# Patient Record
Sex: FEMALE | Race: WHITE | NOT HISPANIC OR LATINO | Employment: OTHER | URBAN - METROPOLITAN AREA
[De-identification: names, ages, dates, MRNs, and addresses within clinical notes are randomized per-mention and may not be internally consistent; named-entity substitution may affect disease eponyms.]

---

## 2017-04-26 RX ORDER — BISOPROLOL FUMARATE AND HYDROCHLOROTHIAZIDE 5; 6.25 MG/1; MG/1
TABLET ORAL
Qty: 90 TABLET | Refills: 1 | Status: SHIPPED | OUTPATIENT
Start: 2017-04-26 | End: 2017-10-16 | Stop reason: SDUPTHER

## 2017-04-26 RX ORDER — FLUTICASONE PROPIONATE 50 MCG
SPRAY, SUSPENSION (ML) NASAL
Qty: 48 G | Refills: 1 | Status: SHIPPED | OUTPATIENT
Start: 2017-04-26 | End: 2017-10-16 | Stop reason: SDUPTHER

## 2017-05-31 ENCOUNTER — OFFICE VISIT (OUTPATIENT)
Dept: INTERNAL MEDICINE | Facility: CLINIC | Age: 67
End: 2017-05-31

## 2017-05-31 VITALS
BODY MASS INDEX: 25.44 KG/M2 | RESPIRATION RATE: 16 BRPM | WEIGHT: 149 LBS | SYSTOLIC BLOOD PRESSURE: 124 MMHG | OXYGEN SATURATION: 96 % | TEMPERATURE: 96.7 F | HEIGHT: 64 IN | DIASTOLIC BLOOD PRESSURE: 80 MMHG | HEART RATE: 51 BPM

## 2017-05-31 DIAGNOSIS — E55.9 HYPOVITAMINOSIS D: ICD-10-CM

## 2017-05-31 DIAGNOSIS — R73.01 IMPAIRED FASTING GLUCOSE: ICD-10-CM

## 2017-05-31 DIAGNOSIS — I65.23 CAROTID ATHEROSCLEROSIS, BILATERAL: ICD-10-CM

## 2017-05-31 DIAGNOSIS — I10 ESSENTIAL HYPERTENSION: Primary | ICD-10-CM

## 2017-05-31 PROBLEM — K57.30 DIVERTICULOSIS OF LARGE INTESTINE WITHOUT HEMORRHAGE: Status: ACTIVE | Noted: 2017-05-31

## 2017-05-31 LAB
25(OH)D3+25(OH)D2 SERPL-MCNC: 42.9 NG/ML (ref 30–100)
ALBUMIN SERPL-MCNC: 4.3 G/DL (ref 3.5–5.2)
ALBUMIN/GLOB SERPL: 1.5 G/DL
ALP SERPL-CCNC: 77 U/L (ref 39–117)
ALT SERPL-CCNC: 10 U/L (ref 1–33)
APPEARANCE UR: CLEAR
AST SERPL-CCNC: 22 U/L (ref 1–32)
BACTERIA #/AREA URNS HPF: NORMAL /HPF
BILIRUB SERPL-MCNC: 0.5 MG/DL (ref 0.1–1.2)
BILIRUB UR QL STRIP: NEGATIVE
BUN SERPL-MCNC: 23 MG/DL (ref 8–23)
BUN/CREAT SERPL: 22.5 (ref 7–25)
CALCIUM SERPL-MCNC: 9.8 MG/DL (ref 8.6–10.5)
CASTS URNS MICRO: NORMAL
CHLORIDE SERPL-SCNC: 99 MMOL/L (ref 98–107)
CHOLEST SERPL-MCNC: 177 MG/DL (ref 0–200)
CO2 SERPL-SCNC: 30.1 MMOL/L (ref 22–29)
COLOR UR: YELLOW
CREAT SERPL-MCNC: 1.02 MG/DL (ref 0.57–1)
EPI CELLS #/AREA URNS HPF: NORMAL /HPF
GLOBULIN SER CALC-MCNC: 2.8 GM/DL
GLUCOSE SERPL-MCNC: 65 MG/DL (ref 65–99)
GLUCOSE UR QL: NEGATIVE
HBA1C MFR BLD: 5.8 % (ref 4.8–5.6)
HDLC SERPL-MCNC: 75 MG/DL (ref 40–60)
HGB UR QL STRIP: NEGATIVE
KETONES UR QL STRIP: NEGATIVE
LDLC SERPL CALC-MCNC: 86 MG/DL (ref 0–100)
LEUKOCYTE ESTERASE UR QL STRIP: NEGATIVE
NITRITE UR QL STRIP: NEGATIVE
PH UR STRIP: 6.5 [PH] (ref 5–8)
POTASSIUM SERPL-SCNC: 4.6 MMOL/L (ref 3.5–5.2)
PROT SERPL-MCNC: 7.1 G/DL (ref 6–8.5)
PROT UR QL STRIP: NEGATIVE
RBC #/AREA URNS HPF: NORMAL /HPF
SODIUM SERPL-SCNC: 139 MMOL/L (ref 136–145)
SP GR UR: 1.01 (ref 1–1.03)
TRIGL SERPL-MCNC: 79 MG/DL (ref 0–150)
UROBILINOGEN UR STRIP-MCNC: (no result) MG/DL
VLDLC SERPL CALC-MCNC: 15.8 MG/DL (ref 5–40)
WBC #/AREA URNS HPF: NORMAL /HPF

## 2017-05-31 PROCEDURE — 99213 OFFICE O/P EST LOW 20 MIN: CPT | Performed by: INTERNAL MEDICINE

## 2017-08-09 ENCOUNTER — OFFICE VISIT (OUTPATIENT)
Dept: INTERNAL MEDICINE | Facility: CLINIC | Age: 67
End: 2017-08-09

## 2017-08-09 VITALS
SYSTOLIC BLOOD PRESSURE: 120 MMHG | DIASTOLIC BLOOD PRESSURE: 90 MMHG | RESPIRATION RATE: 16 BRPM | OXYGEN SATURATION: 98 % | BODY MASS INDEX: 25.27 KG/M2 | HEART RATE: 54 BPM | HEIGHT: 64 IN | WEIGHT: 148 LBS | TEMPERATURE: 97.3 F

## 2017-08-09 DIAGNOSIS — J30.2 SEASONAL ALLERGIC RHINITIS, UNSPECIFIED ALLERGIC RHINITIS TRIGGER: Primary | ICD-10-CM

## 2017-08-09 DIAGNOSIS — J06.9 ACUTE URI: ICD-10-CM

## 2017-08-09 PROCEDURE — 99213 OFFICE O/P EST LOW 20 MIN: CPT | Performed by: NURSE PRACTITIONER

## 2017-08-09 RX ORDER — MONTELUKAST SODIUM 10 MG/1
10 TABLET ORAL NIGHTLY
Qty: 30 TABLET | Refills: 2 | Status: SHIPPED | OUTPATIENT
Start: 2017-08-09 | End: 2017-12-11

## 2017-08-09 NOTE — PROGRESS NOTES
Vitals:    08/09/17 1414   BP: 120/90   Pulse: 54   Resp: 16   Temp: 97.3 °F (36.3 °C)   SpO2: 98%     Last 2 weights    08/09/17  1414   Weight: 148 lb (67.1 kg)     Social History   Substance Use Topics   • Smoking status: Never Smoker   • Smokeless tobacco: Not on file   • Alcohol use No       Subjective     HPI  Upper Respiratory Infection: Patient complains of symptoms of a URI. Symptoms include congestion, cough, sore throat and swollen glands. Onset of symptoms was 4 days ago, unchanged since that time. She also c/o post nasal drip, sore throat and spots on tonsils for the past 4 days .  She is drinking moderate amounts of fluids. Evaluation to date: none.     Pt also has been battling allergies and has been taking flonase and claritin. Still experiencing feeling tired, post nasal drip, clear nasal drainage, itchy throat and eyes.    The following portions of the patient's history were reviewed and updated as appropriate: allergies, current medications, past medical history, past social history and problem list.    Review of Systems   Constitutional: Negative.    HENT: Positive for congestion and sore throat.    Respiratory: Positive for cough.    Cardiovascular: Negative.        Objective     Physical Exam   Constitutional: She is oriented to person, place, and time. Vital signs are normal. She appears well-developed and well-nourished.   HENT:   Head: Normocephalic and atraumatic.   Mouth/Throat: Posterior oropharyngeal erythema present.   Neck: Normal range of motion.   Cardiovascular: Normal rate, regular rhythm and normal heart sounds.    Pulmonary/Chest: Effort normal and breath sounds normal.   Musculoskeletal: Normal range of motion.   Neurological: She is oriented to person, place, and time.   Nursing note and vitals reviewed.      Assessment/Plan   Snow was seen today for blister and sore throat.    Diagnoses and all orders for this visit:    Seasonal allergic rhinitis, unspecified allergic  rhinitis trigger    Acute URI    Other orders  -     montelukast (SINGULAIR) 10 MG tablet; Take 1 tablet by mouth Every Night.           -start singulair nightly for improved allergy relief. If still having symptoms refer to allergy for possible injection  -cont home meds  -FU prn or if symptoms persist/worsen

## 2017-10-16 RX ORDER — BISOPROLOL FUMARATE AND HYDROCHLOROTHIAZIDE 5; 6.25 MG/1; MG/1
TABLET ORAL
Qty: 90 TABLET | Refills: 1 | Status: SHIPPED | OUTPATIENT
Start: 2017-10-16 | End: 2019-01-23

## 2017-10-16 RX ORDER — FLUTICASONE PROPIONATE 50 MCG
SPRAY, SUSPENSION (ML) NASAL
Qty: 48 G | Refills: 1 | Status: SHIPPED | OUTPATIENT
Start: 2017-10-16 | End: 2019-01-23 | Stop reason: SDUPTHER

## 2017-12-11 ENCOUNTER — OFFICE VISIT (OUTPATIENT)
Dept: INTERNAL MEDICINE | Facility: CLINIC | Age: 67
End: 2017-12-11

## 2017-12-11 VITALS
BODY MASS INDEX: 25.61 KG/M2 | TEMPERATURE: 99 F | SYSTOLIC BLOOD PRESSURE: 130 MMHG | DIASTOLIC BLOOD PRESSURE: 82 MMHG | OXYGEN SATURATION: 97 % | HEART RATE: 55 BPM | RESPIRATION RATE: 16 BRPM | WEIGHT: 150 LBS | HEIGHT: 64 IN

## 2017-12-11 DIAGNOSIS — J30.9 CHRONIC ALLERGIC RHINITIS, UNSPECIFIED SEASONALITY, UNSPECIFIED TRIGGER: ICD-10-CM

## 2017-12-11 DIAGNOSIS — R73.01 IMPAIRED FASTING GLUCOSE: ICD-10-CM

## 2017-12-11 DIAGNOSIS — K57.30 DIVERTICULOSIS OF LARGE INTESTINE WITHOUT HEMORRHAGE: ICD-10-CM

## 2017-12-11 DIAGNOSIS — I65.22 ATHEROSCLEROSIS OF LEFT CAROTID ARTERY: ICD-10-CM

## 2017-12-11 DIAGNOSIS — E04.2 MULTIPLE THYROID NODULES: ICD-10-CM

## 2017-12-11 DIAGNOSIS — E55.9 HYPOVITAMINOSIS D: ICD-10-CM

## 2017-12-11 DIAGNOSIS — Z00.00 HEALTH CARE MAINTENANCE: ICD-10-CM

## 2017-12-11 DIAGNOSIS — I10 ESSENTIAL HYPERTENSION: Primary | ICD-10-CM

## 2017-12-11 LAB
25(OH)D3+25(OH)D2 SERPL-MCNC: 65.7 NG/ML (ref 30–100)
ALBUMIN SERPL-MCNC: 3.9 G/DL (ref 3.5–5.2)
ALBUMIN/GLOB SERPL: 1.4 G/DL
ALP SERPL-CCNC: 112 U/L (ref 39–117)
ALT SERPL-CCNC: 22 U/L (ref 1–33)
APPEARANCE UR: CLEAR
AST SERPL-CCNC: 25 U/L (ref 1–32)
BACTERIA #/AREA URNS HPF: NORMAL /HPF
BILIRUB SERPL-MCNC: 0.3 MG/DL (ref 0.1–1.2)
BILIRUB UR QL STRIP: NEGATIVE
BUN SERPL-MCNC: 22 MG/DL (ref 8–23)
BUN/CREAT SERPL: 22 (ref 7–25)
CALCIUM SERPL-MCNC: 9.5 MG/DL (ref 8.6–10.5)
CASTS URNS MICRO: NORMAL
CHLORIDE SERPL-SCNC: 103 MMOL/L (ref 98–107)
CHOLEST SERPL-MCNC: 184 MG/DL (ref 0–200)
CO2 SERPL-SCNC: 29.3 MMOL/L (ref 22–29)
COLOR UR: YELLOW
CREAT SERPL-MCNC: 1 MG/DL (ref 0.57–1)
EPI CELLS #/AREA URNS HPF: NORMAL /HPF
GFR SERPLBLD CREATININE-BSD FMLA CKD-EPI: 55 ML/MIN/1.73
GFR SERPLBLD CREATININE-BSD FMLA CKD-EPI: 67 ML/MIN/1.73
GLOBULIN SER CALC-MCNC: 2.8 GM/DL
GLUCOSE SERPL-MCNC: 84 MG/DL (ref 65–99)
GLUCOSE UR QL: NEGATIVE
HBA1C MFR BLD: 5.53 % (ref 4.8–5.6)
HDLC SERPL-MCNC: 63 MG/DL (ref 40–60)
HGB UR QL STRIP: NEGATIVE
KETONES UR QL STRIP: NEGATIVE
LDLC SERPL CALC-MCNC: 87 MG/DL (ref 0–100)
LEUKOCYTE ESTERASE UR QL STRIP: (no result)
NITRITE UR QL STRIP: NEGATIVE
PH UR STRIP: 6 [PH] (ref 5–8)
POTASSIUM SERPL-SCNC: 4.8 MMOL/L (ref 3.5–5.2)
PROT SERPL-MCNC: 6.7 G/DL (ref 6–8.5)
PROT UR QL STRIP: NEGATIVE
RBC #/AREA URNS HPF: NORMAL /HPF
SODIUM SERPL-SCNC: 142 MMOL/L (ref 136–145)
SP GR UR: 1.01 (ref 1–1.03)
T4 FREE SERPL-MCNC: 1.07 NG/DL (ref 0.93–1.7)
TRIGL SERPL-MCNC: 170 MG/DL (ref 0–150)
TSH SERPL DL<=0.005 MIU/L-ACNC: 2.25 MIU/ML (ref 0.27–4.2)
UROBILINOGEN UR STRIP-MCNC: (no result) MG/DL
VLDLC SERPL CALC-MCNC: 34 MG/DL (ref 5–40)
WBC #/AREA URNS HPF: NORMAL /HPF

## 2017-12-11 PROCEDURE — G0438 PPPS, INITIAL VISIT: HCPCS | Performed by: INTERNAL MEDICINE

## 2017-12-11 PROCEDURE — 99213 OFFICE O/P EST LOW 20 MIN: CPT | Performed by: INTERNAL MEDICINE

## 2017-12-11 PROCEDURE — 93000 ELECTROCARDIOGRAM COMPLETE: CPT | Performed by: INTERNAL MEDICINE

## 2017-12-11 NOTE — PROGRESS NOTES
QUICK REFERENCE INFORMATION:  The ABCs of the Annual Wellness Visit    Initial Medicare Wellness Visit    HEALTH RISK ASSESSMENT    1950    Recent Hospitalizations:  No hospitalization(s) within the last year..        Current Medical Providers:  Patient Care Team:  Alban Isaac MD as PCP - General  Dr Juil Dudley, gynecologist      Smoking Status:  History   Smoking Status   • Never Smoker   Smokeless Tobacco   • Not on file       Alcohol Consumption:  History   Alcohol Use No       Depression Screen:   PHQ-2/PHQ-9 Depression Screening 11/23/2016   Little interest or pleasure in doing things 0   Feeling down, depressed, or hopeless 0   Trouble falling or staying asleep, or sleeping too much 0   Feeling tired or having little energy 0   Poor appetite or overeating 0   Feeling bad about yourself - or that you are a failure or have let yourself or your family down 0   Trouble concentrating on things, such as reading the newspaper or watching television 0   Moving or speaking so slowly that other people could have noticed. Or the opposite - being so fidgety or restless that you have been moving around a lot more than usual 0   Thoughts that you would be better off dead, or of hurting yourself in some way 0   Total Score 0   If you checked off any problems, how difficult have these problems made it for you to do your work, take care of things at home, or get along with other people? Not difficult at all       Health Habits and Functional and Cognitive Screening:  Functional & Cognitive Status 11/23/2016   Do you have difficulty preparing food and eating? No   Do you have difficulty bathing yourself, getting dressed or grooming yourself? No   Do you have difficulty using the toilet? No   Do you have difficulty moving around from place to place? No   In the past year have you fallen or experienced a near fall? No   Do you need help using the phone?  No   Are you deaf or do you have serious difficulty hearing?  No    Do you need help with transportation? No   Do you need help shopping? No   Do you need help preparing meals?  No   Do you need help with housework?  No   Do you need help with laundry? No   Do you need help taking your medications? No   Do you need help managing money? No   Do you have difficulty concentrating, remembering or making decisions? No           Does the patient have evidence of cognitive impairment? No    Asiprin use counseling: Taking ASA appropriately as indicated      Recent Lab Results:    Visual Acuity:  No exam data present    Age-appropriate Screening Schedule:  Refer to the list below for future screening recommendations based on patient's age, sex and/or medical conditions. Orders for these recommended tests are listed in the plan section. The patient has been provided with a written plan.    Health Maintenance   Topic Date Due   • TDAP/TD VACCINES (1 - Tdap) 05/04/1969   • ZOSTER VACCINE  03/07/2016   • COLONOSCOPY  04/20/2016   • INFLUENZA VACCINE  08/01/2017   • MAMMOGRAM  03/08/2019   • PNEUMOCOCCAL VACCINES (65+ LOW/MEDIUM RISK)  Completed        Subjective   History of Present Illness    Snow Anderson is a 67 y.o. female who presents for an Annual Wellness Visit.    The following portions of the patient's history were reviewed and updated as appropriate: allergies, current medications, past family history, past medical history, past social history, past surgical history and problem list.    Outpatient Medications Prior to Visit   Medication Sig Dispense Refill   • aspirin 81 MG tablet Take  by mouth every other day.     • bisoprolol-hydrochlorothiazide (ZIAC) 5-6.25 MG per tablet TAKE 1 TABLET EVERY MORNINGFOR BLOOD PRESSURE 90 tablet 1   • Cholecalciferol (VITAMIN D) 1000 UNITS tablet Take 2,000 Units by mouth.     • fluticasone (FLONASE) 50 MCG/ACT nasal spray USE 2 SPRAYS IN EACH       NOSTRIL DAILY 48 g 1   • loratadine (CLARITIN) 10 MG tablet Take  by mouth.     • montelukast  (SINGULAIR) 10 MG tablet Take 1 tablet by mouth Every Night. 30 tablet 2   • Multiple Vitamin (MULTI VITAMIN DAILY PO) Take  by mouth.       No facility-administered medications prior to visit.        Patient Active Problem List   Diagnosis   • Atopic rhinitis   • Carotid atherosclerosis   • Hypertension   • Impaired fasting glucose   • Hypovitaminosis D   • Health care maintenance   • Diverticulosis of large intestine without hemorrhage       Advance Care Planning:  has an advance directive - a copy HAS NOT been provided    Identification of Risk Factors:  Risk factors include: cardiovascular risk.    Review of Systems   Constitutional: Negative.    HENT: Negative.    Eyes: Negative.    Respiratory: Negative.    Cardiovascular: Negative.    Endocrine: Negative.    Genitourinary: Negative.    Skin: Negative.    Neurological: Negative.    Hematological: Negative.    Psychiatric/Behavioral: Negative.        Compared to one year ago, the patient feels her physical health is the same.  Compared to one year ago, the patient feels her mental health is the same.    Objective     Physical Exam   Constitutional: She appears well-developed and well-nourished.   HENT:   Head: Normocephalic and atraumatic.   Cardiovascular: Normal rate and regular rhythm.  Exam reveals no gallop and no friction rub.    No murmur heard.  Pulmonary/Chest: Effort normal and breath sounds normal. No respiratory distress. She has no wheezes. She has no rales.   Abdominal: Soft. Bowel sounds are normal. She exhibits no distension and no mass. There is no tenderness.   Neurological: She is alert.   Skin: Skin is warm.   Psychiatric: Her behavior is normal.   Vitals reviewed.      There were no vitals filed for this visit.    There is no height or weight on file to calculate BMI.  Discussed the patient's BMI with her. BMI is above normal parameters. Follow-up plan includes:  exercise counseling and nutrition counseling.    Assessment/Plan   Patient  Self-Management and Personalized Health Advice  The patient has been provided with information about: diet, exercise and prevention of cardiac or vascular disease and preventive services including:   · Diabetes screening, see lab orders, Exercise counseling provided, TdaP vaccine.    Visit Diagnoses:    ICD-10-CM ICD-9-CM   1. Essential hypertension I10 401.9   2. Impaired fasting glucose R73.01 790.21   3. Hypovitaminosis D E55.9 268.9   4. Diverticulosis of large intestine without hemorrhage K57.30 562.10   5. Chronic allergic rhinitis, unspecified seasonality, unspecified trigger J30.9 477.9       No orders of the defined types were placed in this encounter.      Outpatient Encounter Prescriptions as of 12/11/2017   Medication Sig Dispense Refill   • aspirin 81 MG tablet Take  by mouth every other day.     • bisoprolol-hydrochlorothiazide (ZIAC) 5-6.25 MG per tablet TAKE 1 TABLET EVERY MORNINGFOR BLOOD PRESSURE 90 tablet 1   • Cholecalciferol (VITAMIN D) 1000 UNITS tablet Take 2,000 Units by mouth.     • fluticasone (FLONASE) 50 MCG/ACT nasal spray USE 2 SPRAYS IN EACH       NOSTRIL DAILY 48 g 1   • loratadine (CLARITIN) 10 MG tablet Take  by mouth.     • montelukast (SINGULAIR) 10 MG tablet Take 1 tablet by mouth Every Night. 30 tablet 2   • Multiple Vitamin (MULTI VITAMIN DAILY PO) Take  by mouth.       No facility-administered encounter medications on file as of 12/11/2017.    She had Prevnar vaccine in October 2015, Pneumovax 23 end November 2016.  She has had shingles vaccine in May 2017 and she has had high-dose influenza vaccine this fall.    Reviewed use of high risk medication in the elderly: not applicable  Reviewed for potential of harmful drug interactions in the elderly: not applicable    Follow Up:  No Follow-up on file.     An After Visit Summary and PPPS with all of these plans were given to the patient.

## 2017-12-11 NOTE — PROGRESS NOTES
Subjective   Snow Anderson is a 67 y.o. female.   10/16/2017  Wt Readings from Last 1 Encounters:   08/09/17 67.1 kg (148 lb)   She was last seen in June 2017, weight was 149 then, now 148.    She is here today for an initial annual wellness visit, please see the additional note of the same date    She is also here for follow-up of hypertension, impaired glucose tolerance, mild carotid disease, thyroid nodules, vitamin D deficiency, diverticular disease of the colon    History of Present Illness   She has hypertension which is treated with Ziac 5/6.25 mg daily.  She does not have any cardiac history or symptoms.  Liver studies in June showed very slight kidney function impairment with serum creatinine level I.02.  Her lipid studies then showed total cholesterol 177, tragus of 79 and HDL 75    She has impaired glucose tolerance and healthy diet and regular exercise of been advised.  Her last hemoglobin A1c level, in June 2017, was 5.80%.    She is history of some mild carotid disease and continues on aspirin 81 mg daily.  No symptoms described.She had screening July 2017 and a Religious and had mild left carotid stenosis, which appeared unchanged from July 2016 and she was advised to have screening again July 2018.  No symptoms have occurred    She has bilateral thyroid nodules and had thyroid ultrasound done in May 2017 with benign appearance.  Follow-up after one year would appear appropriate    She has history of vitamin D deficiency and continues on vitamin D supplement 1000 international units daily.  Her vitamin D level in June was 40 2.    She has diverticular disease of the colon.  She last a colonoscopy in 2015 by Dr. Guido.    She is postmenopausal and followed by Dr. Juli Dudley for gynecology.  She had hysterectomy, BSO, and bladder repair by Dr. Poppy Sanford in January 2016.    Immunizations are reviewed.  She had Prevnar vaccine in October 2015 and Pneumovax 23 end November 2016.    She has a viral  allergies which are perennial.  She takes loratadine 10 mg daily, and uses Flonase nasal spray and also takes Singulair 10 mg daily.  The following portions of the patient's history were reviewed and updated as appropriate: allergies, current medications, past family history, past medical history, past social history, past surgical history and problem list.    Review of Systems   Constitutional: Negative.    HENT: Negative.    Eyes: Negative.    Respiratory: Negative.    Cardiovascular: Negative.    Endocrine: Negative.    Genitourinary: Negative.    Skin: Negative.    Neurological: Negative.    Hematological: Negative.    Psychiatric/Behavioral: Negative.        Objective   Physical Exam   Constitutional: She appears well-developed and well-nourished.   HENT:   Head: Normocephalic and atraumatic.   Right Ear: External ear normal.   Left Ear: External ear normal.   Nose: Nose normal.   Mouth/Throat: Oropharynx is clear and moist.   Bilateral hearing aids    Both tympanic membranes are somewhat sclerotic but intact   Eyes: EOM are normal. Pupils are equal, round, and reactive to light.   Neck: Normal range of motion. Neck supple. No JVD present.   Bilateral thyroid nodules, right more prominent than left   Cardiovascular: Normal rate and regular rhythm.  Exam reveals no gallop and no friction rub.    No murmur heard.  Pulmonary/Chest: Effort normal and breath sounds normal. No respiratory distress. She has no wheezes. She has no rales.   Abdominal: Soft. Bowel sounds are normal. She exhibits no distension and no mass. There is no tenderness.   Musculoskeletal: Normal range of motion. She exhibits no edema or deformity.   Lymphadenopathy:     She has no cervical adenopathy.   Neurological: She is alert.   Skin: Skin is warm and dry.   Psychiatric: She has a normal mood and affect. Her behavior is normal.   Vitals reviewed.        ECG 12 Lead  Date/Time: 12/11/2017 10:53 AM  Performed by: BETINA ALSTON  by: BETINA ALSTON   Comparison: compared with previous ECG from 11/23/2016  Comparison to previous ECG: The previous EKG was within normal limits except for sinus bradycardia  Rhythm: sinus rhythm  Rate: bradycardic  BPM: 47  Conduction: conduction normal  ST Segments: ST segments normal  T Waves: T waves normal  QRS axis: normal  Other: no other findings  Clinical impression: normal ECG  Comments: The resting EKG shows sinus bradycardia at heart rate of 47/m, otherwise normal and unchanged from November 2016          Assessment/Plan   Diagnoses and all orders for this visit:    Essential hypertension  Comments:  Continue Ziac 5/6.25 mg daily  Orders:  -     Comprehensive Metabolic Panel  -     Urinalysis With Microscopic - Urine, Clean Catch  -     ECG 12 Lead    Impaired fasting glucose  Comments:  Advise healthy diet and continue regular exercise, we will check hemoglobin A1c level  Orders:  -     Hemoglobin A1c    Hypovitaminosis D  Comments:  Continue vitamin D supplement 1000 international units daily, will check vitamin D level  Orders:  -     Vitamin D 25 Hydroxy    Diverticulosis of large intestine without hemorrhage  Comments:  Advise high-fiber diet, last colonoscopy was in 2015 by Dr. Guido    Chronic allergic rhinitis, unspecified seasonality, unspecified trigger  Comments:  May discontinue Singulair due to lack of efficacy, continue loratadine 10 mg daily and Flonase nasal spray 2 sprays in each nostril once daily    Atherosclerosis of left carotid artery  Comments:  Continue aspirin 81 mg daily, will check lipid profile, recheck carotid ultrasound around July 2018  Orders:  -     Lipid Panel    Multiple thyroid nodules  Comments:  Ultrasound done in May 2017, will check thyroid levels, recheck ultrasound after one year  Orders:  -     TSH  -     T4, Free    Health care maintenance  Comments:  She has previously had Prevnar, Pneumovax 23, shingles vaccine, and seasonal influenza vaccine.  JAVIER jasso  update is recommended        Schedule office follow-up about 6 months, return sooner if needed                       EMR Dragon/Transcription disclaimer:      Much of this encounter note is an electronic transcription/translation of spoken language to printed text. The electronic translation of spoken language may permit erroneous, or at times, nonsensical words or phrases to be inadvertently transcribed; Although I have reviewed the note for such errors, some may still exist.

## 2018-03-12 ENCOUNTER — OFFICE VISIT (OUTPATIENT)
Dept: INTERNAL MEDICINE | Facility: CLINIC | Age: 68
End: 2018-03-12

## 2018-03-12 VITALS
DIASTOLIC BLOOD PRESSURE: 98 MMHG | HEART RATE: 56 BPM | TEMPERATURE: 97.4 F | HEIGHT: 64 IN | OXYGEN SATURATION: 96 % | RESPIRATION RATE: 16 BRPM | BODY MASS INDEX: 25.68 KG/M2 | WEIGHT: 150.4 LBS | SYSTOLIC BLOOD PRESSURE: 150 MMHG

## 2018-03-12 DIAGNOSIS — I10 ESSENTIAL HYPERTENSION: ICD-10-CM

## 2018-03-12 DIAGNOSIS — N30.01 ACUTE CYSTITIS WITH HEMATURIA: Primary | ICD-10-CM

## 2018-03-12 PROCEDURE — 99213 OFFICE O/P EST LOW 20 MIN: CPT | Performed by: INTERNAL MEDICINE

## 2018-03-12 RX ORDER — NITROFURANTOIN MACROCRYSTALS 100 MG/1
CAPSULE ORAL
Qty: 10 CAPSULE | Refills: 0 | Status: SHIPPED | OUTPATIENT
Start: 2018-03-12 | End: 2018-06-04

## 2018-03-12 NOTE — PROGRESS NOTES
Subjective   Snow Anderson is a 67 y.o. female.   12/11/2017  Wt Readings from Last 1 Encounters:   03/12/18 68.2 kg (150 lb 6.4 oz)   She was last seen in December for annual wellness visit.    She returns for acute care visit regarding urinary tract infection    History of Present Illness   Patient had a urinary tract infection late in January, symptoms of frequency and hematuria were described.  Her daughter, who is a physician's assistant, prescribed ciprofloxacin and her symptoms cleared rapidly.  However last Thursday she had recurrence of dysuria and hematuria.  She is not had fever chills vomiting or diarrhea.  She comes in for evaluation.    She had hysterectomy BSO and bladder repair in January 2016 by Dr. Sanford.    She has hypertension treated with bisoprolol/HCTZ 5/6.25 mg daily.  The following portions of the patient's history were reviewed and updated as appropriate: allergies, current medications, past family history, past medical history, past social history, past surgical history and problem list.    Review of Systems   Constitutional: Negative.    HENT: Negative.    Eyes: Negative.    Respiratory: Negative.    Cardiovascular: Negative.    Endocrine: Negative.    Genitourinary: Positive for dysuria and hematuria.   Skin: Negative.    Neurological: Negative.    Hematological: Negative.    Psychiatric/Behavioral: Negative.        Objective   Physical Exam   Constitutional: She appears well-developed and well-nourished.   HENT:   Head: Normocephalic and atraumatic.   Cardiovascular: Normal rate and regular rhythm.  Exam reveals no gallop and no friction rub.    No murmur heard.  Pulmonary/Chest: Effort normal and breath sounds normal. No respiratory distress. She has no wheezes. She has no rales.   Abdominal: Soft. Bowel sounds are normal. She exhibits no distension and no mass. There is no tenderness.   Neurological: She is alert.   Skin: Skin is warm.   Psychiatric: Her behavior is normal.   Vitals  reviewed.      Assessment/Plan   Snow was seen today for urinary tract infection and hypertension.    Diagnoses and all orders for this visit:    Acute cystitis with hematuria  Comments:  We will check urinalysis and urine culture, in the interim while awaiting results, start Macrobid 100 mg twice a day  Orders:  -     Urine Culture - Urine, Urine, Clean Catch  -     Urinalysis With Microscopic - Urine, Clean Catch    Essential hypertension  Comments:  Blood pressure check and urinalysis about 10 days    Other orders  -     nitrofurantoin (MACRODANTIN) 100 MG capsule; 1 by mouth twice a day until all taken      Return as scheduled, sooner if needed                         EMR Dragon/Transcription disclaimer:      Much of this encounter note is an electronic transcription/translation of spoken language to printed text. The electronic translation of spoken language may permit erroneous, or at times, nonsensical words or phrases to be inadvertently transcribed; Although I have reviewed the note for such errors, some may still exist.

## 2018-03-14 LAB
APPEARANCE UR: CLEAR
BACTERIA #/AREA URNS HPF: NORMAL /HPF
BACTERIA UR CULT: NO GROWTH
BACTERIA UR CULT: NORMAL
BILIRUB UR QL STRIP: NEGATIVE
CASTS URNS MICRO: NORMAL
COLOR UR: YELLOW
EPI CELLS #/AREA URNS HPF: NORMAL /HPF
GLUCOSE UR QL: NEGATIVE
HGB UR QL STRIP: NEGATIVE
KETONES UR QL STRIP: NEGATIVE
LEUKOCYTE ESTERASE UR QL STRIP: NEGATIVE
NITRITE UR QL STRIP: NEGATIVE
PH UR STRIP: 7 [PH] (ref 5–8)
PROT UR QL STRIP: NEGATIVE
RBC #/AREA URNS HPF: NORMAL /HPF
SP GR UR: 1.01 (ref 1–1.03)
UROBILINOGEN UR STRIP-MCNC: (no result) MG/DL
WBC #/AREA URNS HPF: NORMAL /HPF

## 2018-03-14 NOTE — PROGRESS NOTES
The urine was negative for blood, would suspect that she had some infection that was treated by the ciprofloxacin that she took recently, and that is why it has cleared

## 2018-03-28 ENCOUNTER — CLINICAL SUPPORT (OUTPATIENT)
Dept: INTERNAL MEDICINE | Facility: CLINIC | Age: 68
End: 2018-03-28

## 2018-03-28 VITALS — DIASTOLIC BLOOD PRESSURE: 65 MMHG | SYSTOLIC BLOOD PRESSURE: 112 MMHG

## 2018-03-28 DIAGNOSIS — R39.9 UTI SYMPTOMS: Primary | ICD-10-CM

## 2018-03-28 DIAGNOSIS — I10 ESSENTIAL HYPERTENSION: Primary | ICD-10-CM

## 2018-03-28 NOTE — PROGRESS NOTES
Pt came in today for a BP check after a higher than normal BP at her last office visit with Dr Isaac

## 2018-03-29 LAB
APPEARANCE UR: CLEAR
BACTERIA #/AREA URNS HPF: NORMAL /HPF
BILIRUB UR QL STRIP: NEGATIVE
COLOR UR: YELLOW
EPI CELLS #/AREA URNS HPF: NORMAL /HPF
GLUCOSE UR QL: NEGATIVE
HGB UR QL STRIP: NEGATIVE
KETONES UR QL STRIP: NEGATIVE
LEUKOCYTE ESTERASE UR QL STRIP: NEGATIVE
MICRO URNS: NORMAL
MICRO URNS: NORMAL
MUCOUS THREADS URNS QL MICRO: PRESENT /HPF
NITRITE UR QL STRIP: NEGATIVE
PH UR STRIP: 6 [PH] (ref 5–7.5)
PROT UR QL STRIP: NEGATIVE
RBC #/AREA URNS HPF: NORMAL /HPF
SP GR UR: 1.01 (ref 1–1.03)
URINALYSIS REFLEX: NORMAL
UROBILINOGEN UR STRIP-MCNC: 0.2 MG/DL (ref 0.2–1)
WBC #/AREA URNS HPF: NORMAL /HPF

## 2018-06-04 ENCOUNTER — OFFICE VISIT (OUTPATIENT)
Dept: INTERNAL MEDICINE | Facility: CLINIC | Age: 68
End: 2018-06-04

## 2018-06-04 VITALS
HEART RATE: 56 BPM | TEMPERATURE: 97.8 F | HEIGHT: 64 IN | WEIGHT: 148.8 LBS | RESPIRATION RATE: 16 BRPM | OXYGEN SATURATION: 98 % | SYSTOLIC BLOOD PRESSURE: 110 MMHG | DIASTOLIC BLOOD PRESSURE: 80 MMHG | BODY MASS INDEX: 25.4 KG/M2

## 2018-06-04 DIAGNOSIS — I10 ESSENTIAL HYPERTENSION: Primary | ICD-10-CM

## 2018-06-04 DIAGNOSIS — R73.01 IMPAIRED FASTING GLUCOSE: ICD-10-CM

## 2018-06-04 DIAGNOSIS — K57.30 DIVERTICULOSIS OF LARGE INTESTINE WITHOUT HEMORRHAGE: ICD-10-CM

## 2018-06-04 DIAGNOSIS — E55.9 HYPOVITAMINOSIS D: ICD-10-CM

## 2018-06-04 DIAGNOSIS — I65.22 ATHEROSCLEROSIS OF LEFT CAROTID ARTERY: ICD-10-CM

## 2018-06-04 PROBLEM — N30.01 ACUTE CYSTITIS WITH HEMATURIA: Status: RESOLVED | Noted: 2018-03-12 | Resolved: 2018-06-04

## 2018-06-04 LAB
25(OH)D3+25(OH)D2 SERPL-MCNC: 58.3 NG/ML (ref 30–100)
ALBUMIN SERPL-MCNC: 4 G/DL (ref 3.5–5.2)
ALBUMIN/GLOB SERPL: 1.5 G/DL
ALP SERPL-CCNC: 97 U/L (ref 39–117)
ALT SERPL-CCNC: 12 U/L (ref 1–33)
APPEARANCE UR: CLEAR
AST SERPL-CCNC: 19 U/L (ref 1–32)
BACTERIA #/AREA URNS HPF: NORMAL /HPF
BILIRUB SERPL-MCNC: 0.3 MG/DL (ref 0.1–1.2)
BILIRUB UR QL STRIP: NEGATIVE
BUN SERPL-MCNC: 18 MG/DL (ref 8–23)
BUN/CREAT SERPL: 16.8 (ref 7–25)
CALCIUM SERPL-MCNC: 9.1 MG/DL (ref 8.6–10.5)
CASTS URNS MICRO: NORMAL
CHLORIDE SERPL-SCNC: 101 MMOL/L (ref 98–107)
CO2 SERPL-SCNC: 29.5 MMOL/L (ref 22–29)
COLOR UR: YELLOW
CREAT SERPL-MCNC: 1.07 MG/DL (ref 0.57–1)
EPI CELLS #/AREA URNS HPF: NORMAL /HPF
GFR SERPLBLD CREATININE-BSD FMLA CKD-EPI: 51 ML/MIN/1.73
GFR SERPLBLD CREATININE-BSD FMLA CKD-EPI: 62 ML/MIN/1.73
GLOBULIN SER CALC-MCNC: 2.6 GM/DL
GLUCOSE SERPL-MCNC: 84 MG/DL (ref 65–99)
GLUCOSE UR QL: NEGATIVE
HBA1C MFR BLD: 5.74 % (ref 4.8–5.6)
HGB UR QL STRIP: NEGATIVE
KETONES UR QL STRIP: NEGATIVE
LEUKOCYTE ESTERASE UR QL STRIP: NEGATIVE
NITRITE UR QL STRIP: NEGATIVE
PH UR STRIP: 5.5 [PH] (ref 5–8)
POTASSIUM SERPL-SCNC: 3.8 MMOL/L (ref 3.5–5.2)
PROT SERPL-MCNC: 6.6 G/DL (ref 6–8.5)
PROT UR QL STRIP: NEGATIVE
RBC #/AREA URNS HPF: NORMAL /HPF
SODIUM SERPL-SCNC: 144 MMOL/L (ref 136–145)
SP GR UR: 1.02 (ref 1–1.03)
UROBILINOGEN UR STRIP-MCNC: (no result) MG/DL
WBC #/AREA URNS HPF: NORMAL /HPF

## 2018-06-04 PROCEDURE — 99213 OFFICE O/P EST LOW 20 MIN: CPT | Performed by: INTERNAL MEDICINE

## 2018-06-04 RX ORDER — FEXOFENADINE HCL 180 MG/1
TABLET ORAL
COMMUNITY
Start: 2018-04-29

## 2018-06-04 NOTE — PROGRESS NOTES
Subjective   Snow Anderson is a 68 y.o. female.   3/12/2018  Wt Readings from Last 1 Encounters:   06/04/18 67.5 kg (148 lb 12.8 oz)   She was last seen in March 2018 for acute care visit, weight 150 then, now 148.    She returns for follow-up of hypertension, mild carotid disease, impaired glucose tolerance, allergies    History of Present Illness   She was seen in March with urinary symptoms.  Those resolved.  No current urinary symptoms.    She has hypertension treated with bisoprolol/HCTZ 5/6.25 mg daily.  She exercises 3-4 days a week and has no cardiac symptoms described.    She has vitamin D deficiency and takes a vitamin D supplement 1000 international units daily.    She has history of a thyroid nodule, euthyroid levels.  This is been evaluated by ultrasound previously    She is history of some mild carotid disease and vascular screening, low-dose aspirin recommended.  No TIA or stroke symptoms.    She has history of diverticular disease of the colon.  The stools are described as of being like small pellets.  We discussed the use of fiber diet, fiber supplements, and good fluid intake.  Last colonoscopy was in 2015 by Dr. Guido     She has impaired glucose tolerance for which healthy diet and regular exercise been advised.  No diabetic symptoms.    She has allergies and is taking Allegra 180 mg daily and Flonase nasal spray, occasional Sudafed for sinus congestion.    No new problems are described.  The following portions of the patient's history were reviewed and updated as appropriate: allergies, current medications, past family history, past medical history, past social history, past surgical history and problem list.    Review of Systems   Constitutional: Negative.    HENT: Negative.    Eyes: Negative.    Respiratory: Negative.    Cardiovascular: Negative.    Endocrine: Negative.    Genitourinary: Negative.    Skin: Negative.    Neurological: Negative.    Hematological: Negative.     Psychiatric/Behavioral: Negative.        Objective   Physical Exam   Constitutional: She appears well-developed and well-nourished.   HENT:   Head: Normocephalic and atraumatic.   Cardiovascular: Normal rate and regular rhythm.  Exam reveals no gallop and no friction rub.    No murmur heard.  Blood pressure 120/70, left arm sitting   Pulmonary/Chest: Effort normal and breath sounds normal. No respiratory distress. She has no wheezes. She has no rales.   Abdominal: Soft. Bowel sounds are normal. She exhibits no distension and no mass. There is no tenderness.   Neurological: She is alert.   Skin: Skin is warm.   Psychiatric: Her behavior is normal.   Vitals reviewed.      Assessment/Plan   Snow was seen today for hypertension, prediabetes and allergies.    Diagnoses and all orders for this visit:    Essential hypertension  Comments:  Blood pressure 120/70, continue bisoprolol/HCTZ 5/6.25 mg daily  Orders:  -     Comprehensive Metabolic Panel  -     Urinalysis With Microscopic - Urine, Clean Catch    Atherosclerosis of left carotid artery  Comments:  Continue aspirin 81 mg daily    Diverticulosis of large intestine without hemorrhage  Comments:  Recommend high-fiber diet and good fluid intake, last colonoscopy 2015 by Dr. Guido    Impaired fasting glucose  Comments:  Continue healthy diet and regular exercise, we will check hemoglobin A1c level  Orders:  -     Hemoglobin A1c    Hypovitaminosis D  Comments:  Continue vitamin D supplement 1000 international units daily, will check vitamin D level  Orders:  -     Vitamin D 25 Hydroxy      Schedule office follow-up about 6 months, return sooner if needed                         EMR Dragon/Transcription disclaimer:      Much of this encounter note is an electronic transcription/translation of spoken language to printed text. The electronic translation of spoken language may permit erroneous, or at times, nonsensical words or phrases to be inadvertently transcribed;  Although I have reviewed the note for such errors, some may still exist.

## 2018-06-08 DIAGNOSIS — N28.9 ABNORMAL KIDNEY FUNCTION: Primary | ICD-10-CM

## 2018-06-13 ENCOUNTER — RESULTS ENCOUNTER (OUTPATIENT)
Dept: INTERNAL MEDICINE | Facility: CLINIC | Age: 68
End: 2018-06-13

## 2018-06-13 DIAGNOSIS — N28.9 ABNORMAL KIDNEY FUNCTION: ICD-10-CM

## 2018-08-23 LAB
BUN SERPL-MCNC: 20 MG/DL (ref 8–23)
BUN/CREAT SERPL: 20.4 (ref 7–25)
CALCIUM SERPL-MCNC: 9 MG/DL (ref 8.6–10.5)
CHLORIDE SERPL-SCNC: 103 MMOL/L (ref 98–107)
CO2 SERPL-SCNC: 29.5 MMOL/L (ref 22–29)
CREAT SERPL-MCNC: 0.98 MG/DL (ref 0.57–1)
GLUCOSE SERPL-MCNC: 85 MG/DL (ref 65–99)
POTASSIUM SERPL-SCNC: 4.8 MMOL/L (ref 3.5–5.2)
SODIUM SERPL-SCNC: 142 MMOL/L (ref 136–145)

## 2018-08-31 ENCOUNTER — OFFICE VISIT (OUTPATIENT)
Dept: INTERNAL MEDICINE | Facility: CLINIC | Age: 68
End: 2018-08-31

## 2018-08-31 VITALS
BODY MASS INDEX: 25.61 KG/M2 | SYSTOLIC BLOOD PRESSURE: 118 MMHG | DIASTOLIC BLOOD PRESSURE: 70 MMHG | OXYGEN SATURATION: 96 % | WEIGHT: 150 LBS | HEART RATE: 53 BPM | HEIGHT: 64 IN | RESPIRATION RATE: 16 BRPM | TEMPERATURE: 97.7 F

## 2018-08-31 DIAGNOSIS — R30.0 DYSURIA: Primary | ICD-10-CM

## 2018-08-31 PROCEDURE — 99213 OFFICE O/P EST LOW 20 MIN: CPT | Performed by: INTERNAL MEDICINE

## 2018-08-31 RX ORDER — CEPHALEXIN 500 MG/1
CAPSULE ORAL
Qty: 21 CAPSULE | Refills: 0 | Status: SHIPPED | OUTPATIENT
Start: 2018-08-31 | End: 2018-11-07

## 2018-09-02 LAB
APPEARANCE UR: CLEAR
BACTERIA #/AREA URNS HPF: ABNORMAL /HPF
BACTERIA UR CULT: NORMAL
BACTERIA UR CULT: NORMAL
BILIRUB UR QL STRIP: NEGATIVE
CASTS URNS MICRO: ABNORMAL
COLOR UR: YELLOW
EPI CELLS #/AREA URNS HPF: ABNORMAL /HPF
GLUCOSE UR QL: NEGATIVE
HGB UR QL STRIP: NEGATIVE
KETONES UR QL STRIP: NEGATIVE
LEUKOCYTE ESTERASE UR QL STRIP: (no result)
NITRITE UR QL STRIP: NEGATIVE
PH UR STRIP: 6 [PH] (ref 5–8)
PROT UR QL STRIP: NEGATIVE
RBC #/AREA URNS HPF: ABNORMAL /HPF
SP GR UR: 1.01 (ref 1–1.03)
UROBILINOGEN UR STRIP-MCNC: (no result) MG/DL
WBC #/AREA URNS HPF: ABNORMAL /HPF

## 2018-11-07 ENCOUNTER — OFFICE VISIT (OUTPATIENT)
Dept: SURGERY | Facility: CLINIC | Age: 68
End: 2018-11-07

## 2018-11-07 VITALS
SYSTOLIC BLOOD PRESSURE: 120 MMHG | OXYGEN SATURATION: 99 % | TEMPERATURE: 98.1 F | HEIGHT: 64 IN | HEART RATE: 86 BPM | RESPIRATION RATE: 20 BRPM | WEIGHT: 153 LBS | DIASTOLIC BLOOD PRESSURE: 80 MMHG | BODY MASS INDEX: 26.12 KG/M2

## 2018-11-07 DIAGNOSIS — N81.6 RECTOCELE: Primary | ICD-10-CM

## 2018-11-07 PROCEDURE — 99203 OFFICE O/P NEW LOW 30 MIN: CPT | Performed by: COLON & RECTAL SURGERY

## 2018-11-07 NOTE — PROGRESS NOTES
Snow Anderson is a 68 y.o. female who is seen as a consult at the request of Poppy Morris MD for Rectal Pain.      HPI:  bm are from Constipation to loose.    If strain, then feels bulge between vagina and rectum    Push on perineum to get stool started  No rb  No abd pain    Fiber -no  Ss- no  Activa = probiotic        Past Medical History:   Diagnosis Date   • Abnormal kidney function    • Allergic rhinitis    • Arthritis    • Atherosclerosis of both carotid arteries    • Atrophic vaginitis    • Bell's palsy    • Chronic kidney disease    • Cystitis 03/2018   • Cystocele with rectocele 01/2016   • Diverticulosis    • Dysuria    • Enterocele 01/2016   • Hypertension    • IFG (impaired fasting glucose)    • Mitral valve prolapse    • Multiple thyroid nodules 2017    BILATERAL   • Osteoporosis    • Plantar fasciitis    • Plantar wart 10/2016    RIGHT FOOT   • PONV (postoperative nausea and vomiting)    • Seborrheic keratosis    • Sensorineural hearing loss     BILATERAL   • Umbilical hernia 02/04/2016   • Urge incontinence    • Vitamin D deficiency        Past Surgical History:   Procedure Laterality Date   • ANTERIOR AND POSTERIOR VAGINAL REPAIR N/A 01/13/2016    WITH ABDOMINAL ENTEROCELE REPAIR, SACROSPINOUS LIGAMENT FIXATION, AND CYSTOURETHROSCOPY, DR. POPPY MORRIS AT [place]   • COLONOSCOPY N/A 06/18/2015    MULTIPLE SMALL AND LARGE SEVERE DIVERTICULA, EVIDENCE OF AN IMPACTED DIVERTICULUM WITHOUT BLEEDING, RESCOPE IN 5-10 YRS, DR. LIZBETH WRAY AT [facility]   • DILATATION AND CURETTAGE N/A 11/20/2007    REMOVAL OF CERVICAL POLYP, DR. SRAVANTHI CARRERO AT [facility]   • DILATATION AND CURETTAGE N/A 1981   • ENDOMETRIAL ABLATION N/A 1990   • HYSTERECTOMY N/A 01/13/2016    LAPAROSCOPIC HYSTERECTOMY WITH BSO, DR. POPPY MORRIS AT [place]       Social History:   reports that  has never smoked. she has never used smokeless tobacco. She reports that she drinks alcohol. She reports that she does not use drugs.      Marriage status:      Family History   Problem Relation Age of Onset   • Stroke Mother    • Hypertension Mother    • Hyperlipidemia Mother    • Bleeding Disorder Mother    • COPD Father    • Lung cancer Father    • Hearing loss Father    • Heart disease Father    • Cancer Father    • Alcohol abuse Brother    • Atrial fibrillation Sister    • No Known Problems Maternal Grandmother    • No Known Problems Maternal Grandfather    • Diabetes Paternal Grandmother    • No Known Problems Paternal Grandfather    • Alcohol abuse Brother          Current Outpatient Medications:   •  aspirin 81 MG tablet, Take  by mouth every other day., Disp: , Rfl:   •  bisoprolol-hydrochlorothiazide (ZIAC) 5-6.25 MG per tablet, TAKE 1 TABLET EVERY MORNINGFOR BLOOD PRESSURE, Disp: 90 tablet, Rfl: 1  •  Cholecalciferol (VITAMIN D) 1000 UNITS tablet, Take 2,000 Units by mouth., Disp: , Rfl:   •  fexofenadine (ALLEGRA ALLERGY) 180 MG tablet, , Disp: , Rfl:   •  fluticasone (FLONASE) 50 MCG/ACT nasal spray, USE 2 SPRAYS IN EACH       NOSTRIL DAILY, Disp: 48 g, Rfl: 1  •  Multiple Vitamin (MULTI VITAMIN DAILY PO), Take  by mouth., Disp: , Rfl:     Allergy  Sulfa antibiotics and Penicillins    Review of Systems   Constitution: Negative for decreased appetite, weakness and weight gain.   HENT: Negative for congestion, hearing loss and hoarse voice.    Eyes: Positive for discharge. Negative for blurred vision and visual disturbance.   Cardiovascular: Negative for chest pain, cyanosis and leg swelling.   Respiratory: Negative for cough, shortness of breath, sleep disturbances due to breathing and snoring.    Endocrine: Negative for cold intolerance and heat intolerance.   Hematologic/Lymphatic: Does not bruise/bleed easily.   Skin: Negative for itching, poor wound healing and skin cancer.   Musculoskeletal: Negative for arthritis, back pain, joint pain and joint swelling.   Gastrointestinal: Negative for abdominal pain, change in bowel habit, bowel incontinence  and constipation.   Genitourinary: Negative for bladder incontinence, dysuria and hematuria.   Neurological: Negative for brief paralysis, excessive daytime sleepiness, dizziness, focal weakness, headaches and light-headedness.   Psychiatric/Behavioral: Negative for altered mental status and hallucinations. The patient does not have insomnia.    Allergic/Immunologic: Negative for HIV exposure and persistent infections.       Vitals:    11/07/18 1356   BP: 120/80   Pulse: 86   Resp: 20   Temp: 98.1 °F (36.7 °C)   SpO2: 99%     Body mass index is 26.26 kg/m².    Physical Exam   Constitutional: She is oriented to person, place, and time. She appears well-developed and well-nourished. No distress.   HENT:   Head: Normocephalic and atraumatic.   Nose: Nose normal.   Mouth/Throat: Oropharynx is clear and moist.   Eyes: Conjunctivae and EOM are normal. Pupils are equal, round, and reactive to light.   Neck: Normal range of motion. No tracheal deviation present.   Pulmonary/Chest: Effort normal and breath sounds normal. No respiratory distress.   Abdominal: Soft. Bowel sounds are normal. She exhibits no distension.   Genitourinary:   Genitourinary Comments: Perianal exam: external hem - minor tags. With strain bulge from vagina  TIO- sl decrease tone, no masses. + rectocele       Musculoskeletal: Normal range of motion. She exhibits no edema or deformity.   Neurological: She is alert and oriented to person, place, and time. No cranial nerve deficit. Coordination and gait normal.   Skin: Skin is warm and dry.   Psychiatric: She has a normal mood and affect. Her behavior is normal. Judgment normal.   Assessment:  1. Rectocele        Plan:  To urogyn for evaluation.  Hemorrhoids are minor and not contributing to strain

## 2018-12-05 ENCOUNTER — OFFICE VISIT (OUTPATIENT)
Dept: INTERNAL MEDICINE | Facility: CLINIC | Age: 68
End: 2018-12-05

## 2018-12-05 VITALS
HEART RATE: 50 BPM | RESPIRATION RATE: 16 BRPM | SYSTOLIC BLOOD PRESSURE: 136 MMHG | WEIGHT: 153.8 LBS | HEIGHT: 64 IN | TEMPERATURE: 97.2 F | DIASTOLIC BLOOD PRESSURE: 69 MMHG | BODY MASS INDEX: 26.26 KG/M2 | OXYGEN SATURATION: 98 %

## 2018-12-05 DIAGNOSIS — R73.02 IMPAIRED GLUCOSE TOLERANCE: ICD-10-CM

## 2018-12-05 DIAGNOSIS — J01.10 ACUTE FRONTAL SINUSITIS, RECURRENCE NOT SPECIFIED: ICD-10-CM

## 2018-12-05 DIAGNOSIS — E04.1 THYROID NODULE: ICD-10-CM

## 2018-12-05 DIAGNOSIS — E55.9 HYPOVITAMINOSIS D: ICD-10-CM

## 2018-12-05 DIAGNOSIS — I10 HYPERTENSION, UNSPECIFIED TYPE: Primary | ICD-10-CM

## 2018-12-05 LAB
25(OH)D3+25(OH)D2 SERPL-MCNC: 66 NG/ML (ref 30–100)
ALBUMIN SERPL-MCNC: 4.1 G/DL (ref 3.5–5.2)
ALBUMIN/GLOB SERPL: 1.6 G/DL
ALP SERPL-CCNC: 81 U/L (ref 39–117)
ALT SERPL-CCNC: 17 U/L (ref 1–33)
APPEARANCE UR: CLEAR
AST SERPL-CCNC: 32 U/L (ref 1–32)
BACTERIA #/AREA URNS HPF: NORMAL /HPF
BILIRUB SERPL-MCNC: 0.5 MG/DL (ref 0.1–1.2)
BILIRUB UR QL STRIP: NEGATIVE
BUN SERPL-MCNC: 21 MG/DL (ref 8–23)
BUN/CREAT SERPL: 20.6 (ref 7–25)
CALCIUM SERPL-MCNC: 9.7 MG/DL (ref 8.6–10.5)
CASTS URNS MICRO: NORMAL
CHLORIDE SERPL-SCNC: 102 MMOL/L (ref 98–107)
CO2 SERPL-SCNC: 31.9 MMOL/L (ref 22–29)
COLOR UR: YELLOW
CREAT SERPL-MCNC: 1.02 MG/DL (ref 0.57–1)
EPI CELLS #/AREA URNS HPF: NORMAL /HPF
GLOBULIN SER CALC-MCNC: 2.5 GM/DL
GLUCOSE SERPL-MCNC: 77 MG/DL (ref 65–99)
GLUCOSE UR QL: NEGATIVE
HBA1C MFR BLD: 5.4 % (ref 4.8–5.6)
HGB UR QL STRIP: NEGATIVE
KETONES UR QL STRIP: NEGATIVE
LEUKOCYTE ESTERASE UR QL STRIP: NEGATIVE
NITRITE UR QL STRIP: NEGATIVE
PH UR STRIP: 6 [PH] (ref 5–8)
POTASSIUM SERPL-SCNC: 4.8 MMOL/L (ref 3.5–5.2)
PROT SERPL-MCNC: 6.6 G/DL (ref 6–8.5)
PROT UR QL STRIP: NEGATIVE
RBC #/AREA URNS HPF: NORMAL /HPF
SODIUM SERPL-SCNC: 142 MMOL/L (ref 136–145)
SP GR UR: 1.01 (ref 1–1.03)
T4 FREE SERPL-MCNC: 1.06 NG/DL (ref 0.93–1.7)
TSH SERPL DL<=0.005 MIU/L-ACNC: 2.87 MIU/ML (ref 0.27–4.2)
UROBILINOGEN UR STRIP-MCNC: (no result) MG/DL
WBC #/AREA URNS HPF: NORMAL /HPF

## 2018-12-05 PROCEDURE — 99214 OFFICE O/P EST MOD 30 MIN: CPT | Performed by: NURSE PRACTITIONER

## 2018-12-05 RX ORDER — MONTELUKAST SODIUM 10 MG/1
10 TABLET ORAL NIGHTLY
Qty: 30 TABLET | Refills: 1 | Status: SHIPPED | OUTPATIENT
Start: 2018-12-05 | End: 2019-01-23 | Stop reason: SDUPTHER

## 2018-12-05 RX ORDER — CEFDINIR 300 MG/1
300 CAPSULE ORAL 2 TIMES DAILY
Qty: 14 CAPSULE | Refills: 0 | Status: SHIPPED | OUTPATIENT
Start: 2018-12-05 | End: 2018-12-12

## 2018-12-05 NOTE — PROGRESS NOTES
"Subjective   Snow Anderson is a 68 y.o. female.   Chief Complaint   Patient presents with   • Follow-up     6 month Follow up   • Cough   • Sinusitis       Patient presents for six-month follow-up.  This is a 68-year-old female patient of Dr. Isaac with a history of hypertension, mild carotid atherosclerosis, vitamin D deficiency, thyroid nodule, diverticulosis, and impaired glucose tolerance.    For hypertension, she currently takes Ziac 5-6 0.25 mg daily.  She tolerates this medication well and checks her blood pressure periodically at home, which she states has been running in the 120s over 70s.  She is having no symptoms of uncontrolled hypertension.    For carotid atherosclerosis, mild, she currently takes an 81 mg aspirin every other day.  She reports no signs of easy bleeding or bruising.  She reports no blood in the stool.    For vitamin D deficiency, she currently takes 1000 international units of an over-the-counter vitamin D supplement daily.  She reports tolerating this well.    She last had an ultrasound of her thyroid nodule 1 year ago.  She states that she has had no symptoms of hypo-or hyperthyroidism.    Diverticulosis: She last colonoscopy with Dr. Guido in 2015.  She reports that he advised her to follow-up with colonoscopies routinely, every 10 years.  She reports eating a high-fiber, generally healthy diet.  She has had no GI upset or symptoms of diverticulitis.    She does report today that she has had sinus pressure, a \"barky\" cough, postnasal drip, and rhinorrhea off and on for one month.  She has been taking Sudafed and Benadryl, which helped her symptoms.  She reports no fevers or chills.  She does report some degree of fatigue.  She currently takes Allegra and Flonase daily for allergic rhinitis.  She reports no shortness of breath or chest discomfort.    She also reports that over the past 3 months, she has had 3-4 isolated incidences of hematuria.  She has spoken with her OB/GYN " "about this problem, and has an appointment in January with a uro-gynecologist, Dr. Juan Taveras, for further evaluation.  She does state that she has a history of cystocele.  She reports that she has had no incidences of hematuria since August 2018.         The following portions of the patient's history were reviewed and updated as appropriate: allergies, current medications, past family history, past medical history, past social history, past surgical history and problem list.    Review of Systems   Constitutional: Negative for activity change, chills, fatigue, fever, unexpected weight gain and unexpected weight loss.   HENT: Positive for congestion, postnasal drip, rhinorrhea, sinus pressure and sneezing. Negative for hearing loss, sore throat, tinnitus and trouble swallowing.    Eyes: Negative for photophobia, pain and visual disturbance.   Respiratory: Positive for cough. Negative for chest tightness, shortness of breath and wheezing.    Cardiovascular: Negative for chest pain, palpitations and leg swelling.   Gastrointestinal: Negative for abdominal distention, abdominal pain, constipation, diarrhea, nausea and vomiting.   Endocrine: Negative for polydipsia, polyphagia and polyuria.   Genitourinary: Negative for dysuria, frequency, hematuria and urgency.   Neurological: Negative for dizziness, weakness, numbness and headache.   All other systems reviewed and are negative.      Objective    /69 (BP Location: Left arm, Patient Position: Sitting, Cuff Size: Small Adult)   Pulse 50   Temp 97.2 °F (36.2 °C) (Oral)   Resp 16   Ht 162.6 cm (64\")   Wt 69.8 kg (153 lb 12.8 oz)   SpO2 98%   BMI 26.40 kg/m²     Physical Exam   Constitutional: She is oriented to person, place, and time. She appears well-developed and well-nourished. No distress.   HENT:   Head: Normocephalic and atraumatic.   Right Ear: External ear normal. A middle ear effusion is present.   Left Ear: External ear normal. A middle ear " effusion is present.   Nose: Mucosal edema, rhinorrhea and congestion present. Right sinus exhibits frontal sinus tenderness. Right sinus exhibits no maxillary sinus tenderness. Left sinus exhibits frontal sinus tenderness. Left sinus exhibits no maxillary sinus tenderness.   Mouth/Throat: Uvula is midline, oropharynx is clear and moist and mucous membranes are normal. No oropharyngeal exudate, posterior oropharyngeal edema, posterior oropharyngeal erythema or tonsillar abscesses.   Eyes: Conjunctivae and EOM are normal. Pupils are equal, round, and reactive to light. Right eye exhibits no discharge. Left eye exhibits no discharge.   Neck: Normal range of motion. Neck supple. No JVD present. Carotid bruit is not present.       Movable nodule palpated to right thyroid.  Nontender per patient.  Patient states that this is a known.   Cardiovascular: Normal rate, regular rhythm, normal heart sounds and intact distal pulses.   Pulmonary/Chest: Effort normal and breath sounds normal. No stridor. No respiratory distress. She has no wheezes. She has no rales. She exhibits no tenderness.   Lungs are CTA bilaterally   Abdominal: Soft. Bowel sounds are normal. She exhibits no distension. There is no tenderness.   Musculoskeletal: Normal range of motion.   Lymphadenopathy:     She has no cervical adenopathy.   Neurological: She is alert and oriented to person, place, and time.   Skin: Skin is warm and dry. Capillary refill takes less than 2 seconds. She is not diaphoretic.   Psychiatric: She has a normal mood and affect. Her behavior is normal.   Nursing note and vitals reviewed.        Assessment/Plan   Snow was seen today for follow-up, cough and sinusitis.    Diagnoses and all orders for this visit:    Hypertension, unspecified type  -     Comprehensive metabolic panel  -     Urinalysis With Microscopic - Urine, Clean Catch    Acute frontal sinusitis, recurrence not specified  -     montelukast (SINGULAIR) 10 MG tablet;  Take 1 tablet by mouth Every Night.  -     cefdinir (OMNICEF) 300 MG capsule; Take 1 capsule by mouth 2 (Two) Times a Day for 7 days.  -     Comprehensive metabolic panel    Hypovitaminosis D  -     Comprehensive metabolic panel  -     Vitamin D 25 hydroxy    Impaired glucose tolerance  -     Comprehensive metabolic panel  -     Hemoglobin A1c    Thyroid nodule  -     US Thyroid  -     TSH  -     T4, Free      Hypertension: Continue Ziac 5-6 0.25 mg daily.  Continue to periodically monitor blood pressure.    Mild carotid atherosclerosis: Continue 81 mg aspirin.  Patient is not fasting today, so we will not check lipids.  Offered to move lab appointment to another day this week when she is fasting, but she prefers to get labs drawn today states that she will get lipids drawn at her next PCP follow-up.    Vitamin D deficiency: Continue 1000 IUs daily of vitamin D supplement over-the-counter.  We will check her vitamin D level today.    Thyroid nodules: We will assess thyroid ultrasound to assess stability, as it has been 1 year since she last had one.  We will also assess free T4 and TSH.    Diverticulosis: Continue routine screening colonoscopies per Dr. Guido.  Last was 2015.     Impaired glucose intolerance/Prediabetes: We will assess CMP and hemoglobin A1c.  Discussed diet and exercise as lifestyle modifications to prevent diabetes and promote cardiovascular health.    We will treat acute frontal sinusitis with Omnicef 3 mg twice a day ×7 days.  Also will begin Singulair 10 mg nightly.  Continue antihistamine and Flonase.    Patient has a follow-up appointment scheduled with a new PCP, Dr. Lilo Lazcano, in January, after Dr. Isaac retires.  She will continue routine follow-up and health maintenance appointments.  Follow-up in the meantime if sinusitis symptoms persist or worsen.

## 2018-12-06 NOTE — PROGRESS NOTES
Please let patient know that her labs look stable. Vitamin D is in normal range, continue with her supplement as she is currently taking. Thyroid labs are normal. Urine is normal. A1c is normal at 5.4.

## 2018-12-12 ENCOUNTER — HOSPITAL ENCOUNTER (OUTPATIENT)
Dept: ULTRASOUND IMAGING | Facility: HOSPITAL | Age: 68
Discharge: HOME | End: 2018-12-12
Admitting: NURSE PRACTITIONER

## 2018-12-12 PROCEDURE — 76536 US EXAM OF HEAD AND NECK: CPT

## 2018-12-14 NOTE — PROGRESS NOTES
Please obtain previous thyroid ultrasound results from [facility] for correlation with this ultrasound.

## 2018-12-27 ENCOUNTER — OFFICE VISIT (OUTPATIENT)
Dept: INTERNAL MEDICINE | Facility: CLINIC | Age: 68
End: 2018-12-27

## 2018-12-27 VITALS
RESPIRATION RATE: 16 BRPM | BODY MASS INDEX: 26.36 KG/M2 | HEIGHT: 64 IN | OXYGEN SATURATION: 96 % | SYSTOLIC BLOOD PRESSURE: 145 MMHG | HEART RATE: 58 BPM | TEMPERATURE: 98.7 F | DIASTOLIC BLOOD PRESSURE: 75 MMHG | WEIGHT: 154.4 LBS

## 2018-12-27 DIAGNOSIS — J02.9 SORE THROAT: Primary | ICD-10-CM

## 2018-12-27 DIAGNOSIS — J01.11 ACUTE RECURRENT FRONTAL SINUSITIS: ICD-10-CM

## 2018-12-27 LAB
EXPIRATION DATE: NORMAL
INTERNAL CONTROL: NORMAL
Lab: NORMAL
S PYO AG THROAT QL: NEGATIVE

## 2018-12-27 PROCEDURE — 87880 STREP A ASSAY W/OPTIC: CPT | Performed by: NURSE PRACTITIONER

## 2018-12-27 PROCEDURE — 99213 OFFICE O/P EST LOW 20 MIN: CPT | Performed by: NURSE PRACTITIONER

## 2018-12-27 RX ORDER — DOXYCYCLINE 100 MG/1
100 CAPSULE ORAL 2 TIMES DAILY
Qty: 14 CAPSULE | Refills: 0 | Status: SHIPPED | OUTPATIENT
Start: 2018-12-27 | End: 2019-01-03

## 2018-12-27 NOTE — PROGRESS NOTES
Subjective   Snow Anderson is a 68 y.o. female.   Chief Complaint   Patient presents with   • URI       Patient presents for evaluation of cough, congestion, fatigue.  She was seen by me on 12/5/18 and treated for acute sinusitis with a course of Omnicef 300 mg twice a day ×7 days.  She also started Singulair.  She does have an extensive allergy history.  Chest the takes Allegra and Flonase daily.  She states that at the completion of her antibiotic, she was feeling much better with having no further symptoms.  One week ago her symptoms returned.  She endorses maxillary and frontal sinus pain and pressure, fatigue, cough with production of thick green sputum, postnasal drip, and rhinorrhea.  She does endorse a sore throat.  She denies shortness of breath or chest pain.  She denies development of any other new issues today.         The following portions of the patient's history were reviewed and updated as appropriate: allergies, current medications, past family history, past medical history, past social history, past surgical history and problem list.    Review of Systems   Constitutional: Positive for fatigue. Negative for activity change, chills, fever, unexpected weight gain and unexpected weight loss.   HENT: Positive for congestion, postnasal drip, rhinorrhea, sinus pressure and sore throat. Negative for hearing loss, sneezing and tinnitus.    Eyes: Negative for photophobia, pain and visual disturbance.   Respiratory: Positive for cough. Negative for chest tightness, shortness of breath and wheezing.    Cardiovascular: Negative for chest pain, palpitations and leg swelling.   Gastrointestinal: Negative for abdominal distention, abdominal pain, constipation, diarrhea, nausea and vomiting.   Endocrine: Negative for polydipsia, polyphagia and polyuria.   Genitourinary: Negative for dysuria, frequency, hematuria and urgency.   Neurological: Negative for dizziness, weakness, numbness and headache.   All other  "systems reviewed and are negative.      Objective    /75 (BP Location: Left arm, Patient Position: Sitting, Cuff Size: Small Adult)   Pulse 58   Temp 98.7 °F (37.1 °C) (Oral)   Resp 16   Ht 162.6 cm (64\")   Wt 70 kg (154 lb 6.4 oz)   SpO2 96%   BMI 26.50 kg/m²     Physical Exam   Constitutional: She is oriented to person, place, and time. She appears well-developed and well-nourished. She appears ill. No distress.   HENT:   Head: Normocephalic and atraumatic.   Right Ear: Hearing, external ear and ear canal normal. A middle ear effusion is present.   Left Ear: Hearing, external ear and ear canal normal. A middle ear effusion is present.   Nose: Mucosal edema, rhinorrhea and congestion present. Right sinus exhibits maxillary sinus tenderness and frontal sinus tenderness. Left sinus exhibits maxillary sinus tenderness and frontal sinus tenderness.   Mouth/Throat: Uvula is midline and mucous membranes are normal. Oropharyngeal exudate (  Left tonsillar exudate) and posterior oropharyngeal erythema present. No posterior oropharyngeal edema.   Eyes: Conjunctivae and EOM are normal. Pupils are equal, round, and reactive to light. Right eye exhibits no discharge. Left eye exhibits no discharge.   Neck: Normal range of motion. Neck supple. No JVD present. No tracheal deviation present. No thyromegaly present.   Cardiovascular: Normal rate, regular rhythm, normal heart sounds and intact distal pulses. Exam reveals no gallop and no friction rub.   No murmur heard.  Pulmonary/Chest: Effort normal and breath sounds normal. No stridor. No respiratory distress. She has no wheezes. She has no rales. She exhibits no tenderness.   Lungs are CTA bilaterally   Abdominal: Soft. Bowel sounds are normal. She exhibits no distension. There is no tenderness.   Musculoskeletal: Normal range of motion.   Lymphadenopathy:     She has no cervical adenopathy.   Neurological: She is alert and oriented to person, place, and time. "   Skin: Skin is warm and dry. Capillary refill takes less than 2 seconds. She is not diaphoretic.   Psychiatric: She has a normal mood and affect. Her behavior is normal. Judgment and thought content normal.   Nursing note and vitals reviewed.    Lab Results   Component Value Date    RAPSCRN Negative 12/27/2018         Assessment/Plan   Snow was seen today for uri.    Diagnoses and all orders for this visit:    Sore throat  -     POCT rapid strep A  -     doxycycline (MONODOX) 100 MG capsule; Take 1 capsule by mouth 2 (Two) Times a Day for 7 days.    Acute recurrent frontal sinusitis  -     doxycycline (MONODOX) 100 MG capsule; Take 1 capsule by mouth 2 (Two) Times a Day for 7 days.      - Recurrent acute sinusitis: We will treat with doxycycline 100 mg twice a day ×7 days.  She does have significant left tonsillar exudate.  Continue Mucinex, nasal saline solution, Singulair, Flonase, Allegra for additional allergy treatment and relief of congestion.  She has an appointment with Dr. Sloan, [facility] in [place], coming up soon.  We will make sure that this office gets the ultrasound results from her recent thyroid ultrasound as well.  She will call our office and let us know when her appointment is so we can get this information to [facility].    - Follow-up if symptoms persist or worsen.

## 2019-01-23 ENCOUNTER — OFFICE VISIT (OUTPATIENT)
Dept: FAMILY MEDICINE CLINIC | Facility: CLINIC | Age: 69
End: 2019-01-23

## 2019-01-23 VITALS
WEIGHT: 154 LBS | HEIGHT: 64 IN | TEMPERATURE: 98 F | SYSTOLIC BLOOD PRESSURE: 134 MMHG | HEART RATE: 61 BPM | DIASTOLIC BLOOD PRESSURE: 76 MMHG | BODY MASS INDEX: 26.29 KG/M2 | OXYGEN SATURATION: 99 %

## 2019-01-23 DIAGNOSIS — I10 ESSENTIAL HYPERTENSION: Primary | ICD-10-CM

## 2019-01-23 DIAGNOSIS — J30.89 NON-SEASONAL ALLERGIC RHINITIS, UNSPECIFIED TRIGGER: ICD-10-CM

## 2019-01-23 PROBLEM — R30.0 DYSURIA: Status: RESOLVED | Noted: 2018-08-31 | Resolved: 2019-01-23

## 2019-01-23 PROCEDURE — 99214 OFFICE O/P EST MOD 30 MIN: CPT | Performed by: FAMILY MEDICINE

## 2019-01-23 RX ORDER — FLUTICASONE PROPIONATE 50 MCG
2 SPRAY, SUSPENSION (ML) NASAL DAILY
Qty: 48 G | Refills: 3 | Status: SHIPPED | OUTPATIENT
Start: 2019-01-23 | End: 2020-01-13 | Stop reason: SDUPTHER

## 2019-01-23 RX ORDER — MONTELUKAST SODIUM 10 MG/1
10 TABLET ORAL NIGHTLY
Qty: 90 TABLET | Refills: 3 | Status: SHIPPED | OUTPATIENT
Start: 2019-01-23 | End: 2020-01-13 | Stop reason: SDUPTHER

## 2019-01-23 RX ORDER — CHLORTHALIDONE 25 MG/1
25 TABLET ORAL DAILY
Qty: 30 TABLET | Refills: 0 | Status: SHIPPED | OUTPATIENT
Start: 2019-01-23 | End: 2019-02-22 | Stop reason: SDUPTHER

## 2019-01-23 RX ORDER — POTASSIUM CHLORIDE 20 MEQ/1
20 TABLET, EXTENDED RELEASE ORAL DAILY
Qty: 30 TABLET | Refills: 2 | Status: SHIPPED | OUTPATIENT
Start: 2019-01-23 | End: 2019-02-22 | Stop reason: SDUPTHER

## 2019-01-23 NOTE — PROGRESS NOTES
Snow Anderson is a 68 y.o. female.     Chief Complaint   Patient presents with   • Establish Care     new pt establishing today with dr nazario    • Hypertension     follow up no complains sometimes feels tiredness       HPI     Pt is a pleasant 68 y.o. YO female here for HTN. New patient to me and this office.    PMH includes hypertension well controlled but with occasional bradycardia and decreased energy with beta blocker, minimal carotid atherosclerosis, hypovitaminosis D, hyperglycemia with hemoglobin A1c of 5.8 last year that has improved to 5.4 last month.  Review of prior records, multiple thyroid nodules with normal thyroid function.      The following portions of the patient's history were reviewed and updated as appropriate: allergies, current medications, past family history, past medical history, past social history, past surgical history and problem list.    Review of Systems   Constitutional: Positive for fatigue.   HENT: Negative.    Eyes: Negative.    Cardiovascular: Positive for leg swelling. Negative for chest pain and palpitations.   Gastrointestinal: Positive for constipation.   Genitourinary: Negative.    Musculoskeletal: Negative.    Allergic/Immunologic: Positive for environmental allergies and immunocompromised state. Negative for food allergies.   Neurological: Negative.    Hematological: Negative.    Psychiatric/Behavioral: Negative.        Objective  Vitals:    01/23/19 1317   BP: 134/76   Pulse: 61   Temp: 98 °F (36.7 °C)   SpO2: 99%        Physical Exam   Constitutional: She is oriented to person, place, and time. She appears well-developed and well-nourished. No distress.   HENT:   Head: Normocephalic.   Nose: Nose normal.   Eyes: EOM are normal.   Neck: No thyromegaly present.   Cardiovascular: Normal rate, regular rhythm, normal heart sounds and intact distal pulses.   No murmur heard.  Pulmonary/Chest: Effort normal and breath sounds normal. No respiratory distress.    Musculoskeletal: Normal range of motion.   Neurological: She is alert and oriented to person, place, and time.   Skin: Skin is warm and dry. No rash noted.   Psychiatric: She has a normal mood and affect. Her behavior is normal. Judgment and thought content normal.   Nursing note and vitals reviewed.        Current Outpatient Medications:   •  aspirin 81 MG tablet, Take  by mouth every other day., Disp: , Rfl:   •  Cholecalciferol (VITAMIN D) 1000 UNITS tablet, Take 2,000 Units by mouth., Disp: , Rfl:   •  fexofenadine (ALLEGRA ALLERGY) 180 MG tablet, , Disp: , Rfl:   •  fluticasone (FLONASE) 50 MCG/ACT nasal spray, 2 sprays by Each Nare route Daily., Disp: 48 g, Rfl: 3  •  Fluticasone Furoate-Vilanterol (BREO ELLIPTA) 200-25 MCG/INH aerosol powder  inhaler, , Disp: , Rfl:   •  montelukast (SINGULAIR) 10 MG tablet, Take 1 tablet by mouth Every Night., Disp: 90 tablet, Rfl: 3  •  Multiple Vitamin (MULTI VITAMIN DAILY PO), Take  by mouth., Disp: , Rfl:   •  chlorthalidone (HYGROTON) 25 MG tablet, Take 1 tablet by mouth Daily., Disp: 30 tablet, Rfl: 0  •  potassium chloride (K-DUR,KLOR-CON) 20 MEQ CR tablet, Take 1 tablet by mouth Daily., Disp: 30 tablet, Rfl: 2    Procedures    Lab Results (most recent)     None              Snow was seen today for establish care and hypertension.    Diagnoses and all orders for this visit:    Essential hypertension  -     chlorthalidone (HYGROTON) 25 MG tablet; Take 1 tablet by mouth Daily.  -     potassium chloride (K-DUR,KLOR-CON) 20 MEQ CR tablet; Take 1 tablet by mouth Daily.    Non-seasonal allergic rhinitis, unspecified trigger  -     montelukast (SINGULAIR) 10 MG tablet; Take 1 tablet by mouth Every Night.  -     fluticasone (FLONASE) 50 MCG/ACT nasal spray; 2 sprays by Each Nare route Daily.      Hypertension well controlled but not tolerating beta blocker with significant fatigue.  Additionally with updated JNC 8 guidelines I think we should change her blood pressure  regimen.  New prescription for chlorthalidone as she does get occasional edema, potassium supplementation prescribed.     Patient with recurrent allergies and often bronchitis that lingers for long periods of time and cough.  Flonase and montelukast refilled.    Return in about 4 weeks (around 2/20/2019), or if symptoms worsen or fail to improve, for Recheck HTN.      Lilo Koehler MD

## 2019-02-11 ENCOUNTER — TELEPHONE (OUTPATIENT)
Dept: FAMILY MEDICINE CLINIC | Facility: CLINIC | Age: 69
End: 2019-02-11

## 2019-02-22 ENCOUNTER — OFFICE VISIT (OUTPATIENT)
Dept: FAMILY MEDICINE CLINIC | Facility: CLINIC | Age: 69
End: 2019-02-22

## 2019-02-22 VITALS
TEMPERATURE: 98 F | HEIGHT: 64 IN | OXYGEN SATURATION: 98 % | HEART RATE: 58 BPM | WEIGHT: 153 LBS | BODY MASS INDEX: 26.12 KG/M2 | DIASTOLIC BLOOD PRESSURE: 84 MMHG | SYSTOLIC BLOOD PRESSURE: 136 MMHG

## 2019-02-22 DIAGNOSIS — R00.2 PALPITATIONS: ICD-10-CM

## 2019-02-22 DIAGNOSIS — I10 ESSENTIAL HYPERTENSION: Primary | ICD-10-CM

## 2019-02-22 PROCEDURE — 99214 OFFICE O/P EST MOD 30 MIN: CPT | Performed by: FAMILY MEDICINE

## 2019-02-22 RX ORDER — CHLORTHALIDONE 25 MG/1
25 TABLET ORAL DAILY
Qty: 90 TABLET | Refills: 3 | Status: SHIPPED | OUTPATIENT
Start: 2019-02-22 | End: 2019-02-25 | Stop reason: SDUPTHER

## 2019-02-22 RX ORDER — POTASSIUM CHLORIDE 20 MEQ/1
20 TABLET, EXTENDED RELEASE ORAL DAILY
Qty: 90 TABLET | Refills: 3 | Status: SHIPPED | OUTPATIENT
Start: 2019-02-22 | End: 2019-02-25 | Stop reason: SDUPTHER

## 2019-02-22 NOTE — PROGRESS NOTES
Snow Anderson is a 68 y.o. female.     Chief Complaint   Patient presents with   • Hypertension     follow up pt has no complains feeling better  sometimes feels going to pass out        HPI     Pt is a pleasant 68 y.o. YO female here for HTN.  PMH includes HTN.    HTN: Uncontrolled Home BP ranges usually in the mid 130s/80s.    - Currently treated with  Chlorthalidone 25mg, previously on Bisoprolol 5mg and HCTZ 6.25  - Diagnosed years, chronic  - Asymptomatic, no chest pain, or SOA.  Positive palpitations - hx of PVCs, las irregular HR was 70.  Some symptoms of hypotension when standing, drinking 2 L of water a day.  No syncopal episodes.  - Last Cr of 1.02 12/5/2018  - Lifestyle measures atempted: Increasing exercise, lower salt intake.     The following portions of the patient's history were reviewed and updated as appropriate: allergies, current medications, past family history, past medical history, past social history, past surgical history and problem list.    Review of Systems   Constitutional: Negative.    HENT: Negative.    Eyes: Negative.    Respiratory: Negative.    Cardiovascular: Positive for palpitations. Negative for chest pain and leg swelling.   Gastrointestinal: Negative.    Endocrine: Negative.    Genitourinary: Negative.    Musculoskeletal: Negative.    Skin: Negative.    Neurological: Positive for light-headedness.   Hematological: Negative.    Psychiatric/Behavioral: Negative.        Objective  Vitals:    02/22/19 1346   BP: 136/84   Pulse: 58   Temp: 98 °F (36.7 °C)   SpO2: 98%        Physical Exam   Constitutional: She is oriented to person, place, and time. She appears well-developed and well-nourished. No distress.   132/84   HENT:   Head: Normocephalic.   Nose: Nose normal.   Eyes: EOM are normal.   Cardiovascular: Normal rate, regular rhythm, normal heart sounds and intact distal pulses.   No murmur heard.  Pulmonary/Chest: Effort normal and breath sounds normal. No respiratory  distress.   Musculoskeletal: Normal range of motion.   Neurological: She is alert and oriented to person, place, and time.   Skin: Skin is warm and dry. No rash noted.   Psychiatric: She has a normal mood and affect. Her behavior is normal. Judgment and thought content normal.   Nursing note and vitals reviewed.        Current Outpatient Medications:   •  aspirin 81 MG tablet, Take  by mouth every other day., Disp: , Rfl:   •  chlorthalidone (HYGROTON) 25 MG tablet, Take 1 tablet by mouth Daily., Disp: 90 tablet, Rfl: 3  •  Cholecalciferol (VITAMIN D) 1000 UNITS tablet, Take 2,000 Units by mouth., Disp: , Rfl:   •  fexofenadine (ALLEGRA ALLERGY) 180 MG tablet, , Disp: , Rfl:   •  fluticasone (FLONASE) 50 MCG/ACT nasal spray, 2 sprays by Each Nare route Daily., Disp: 48 g, Rfl: 3  •  Fluticasone Furoate-Vilanterol (BREO ELLIPTA) 200-25 MCG/INH aerosol powder  inhaler, , Disp: , Rfl:   •  montelukast (SINGULAIR) 10 MG tablet, Take 1 tablet by mouth Every Night., Disp: 90 tablet, Rfl: 3  •  Multiple Vitamin (MULTI VITAMIN DAILY PO), Take  by mouth., Disp: , Rfl:   •  potassium chloride (K-DUR,KLOR-CON) 20 MEQ CR tablet, Take 1 tablet by mouth Daily., Disp: 90 tablet, Rfl: 3    Procedures    Lab Results (most recent)     None              Snow was seen today for hypertension.    Diagnoses and all orders for this visit:    Essential hypertension  -     Basic Metabolic Panel  -     chlorthalidone (HYGROTON) 25 MG tablet; Take 1 tablet by mouth Daily.  -     potassium chloride (K-DUR,KLOR-CON) 20 MEQ CR tablet; Take 1 tablet by mouth Daily.    Palpitations      Patient with hypertension that is not well controlled but now with some symptoms of lightheadedness and palpitations.  Not wanting to go back on beta-blocker as this causes excessive fatigue.  She states that the episodic palpitations last about 30 seconds, heart rate is typically in the 70s, never had an episode of tachycardia.  Okay to continue off  beta-blocker.  Stay on the same dose of chlorthalidone as she has had some episodes of lightheadedness.  Plan to increase exercise and decrease salt intake to lower blood pressure as she is right on the threshold of normal.    Return in about 6 months (around 8/22/2019), or if symptoms worsen or fail to improve, for Recheck HTN .      Lilo Koehler MD

## 2019-02-23 LAB
BUN SERPL-MCNC: 22 MG/DL (ref 8–23)
BUN/CREAT SERPL: 24.4 (ref 7–25)
CALCIUM SERPL-MCNC: 9.8 MG/DL (ref 8.6–10.5)
CHLORIDE SERPL-SCNC: 97 MMOL/L (ref 98–107)
CO2 SERPL-SCNC: 31.6 MMOL/L (ref 22–29)
CREAT SERPL-MCNC: 0.9 MG/DL (ref 0.57–1)
GLUCOSE SERPL-MCNC: 134 MG/DL (ref 65–99)
POTASSIUM SERPL-SCNC: 4 MMOL/L (ref 3.5–5.2)
SODIUM SERPL-SCNC: 142 MMOL/L (ref 136–145)

## 2019-02-25 DIAGNOSIS — I10 ESSENTIAL HYPERTENSION: ICD-10-CM

## 2019-02-25 RX ORDER — CHLORTHALIDONE 25 MG/1
25 TABLET ORAL DAILY
Qty: 10 TABLET | Refills: 0 | Status: SHIPPED | OUTPATIENT
Start: 2019-02-25 | End: 2020-01-13 | Stop reason: SDUPTHER

## 2019-02-25 RX ORDER — POTASSIUM CHLORIDE 20 MEQ/1
20 TABLET, EXTENDED RELEASE ORAL DAILY
Qty: 10 TABLET | Refills: 0 | Status: SHIPPED | OUTPATIENT
Start: 2019-02-25 | End: 2020-01-13 | Stop reason: SDUPTHER

## 2019-02-25 NOTE — TELEPHONE ENCOUNTER
Pt requesting short supply of chlorthalidone (HYGROTON) 25 MG tablet and potassium chloride (K-DUR,KLOR-CON) 20 MEQ CR tablet, until [facility] Rx can arrive?        Pt is experiencing blood in her urine. It happened this past weekend. Pt will experience pain in her bladder which then will subside then the blood will come, very noticeable. Pt's past physician wanted pt to make an appointment with Urologist Romel Hauser MD. [phone]. Should pt make an appointment with  first?

## 2019-02-25 NOTE — TELEPHONE ENCOUNTER
It is okay for her to see the urologist first, however if it will take some time to get in with him I would like to see her this week.    (If she needs a referral again OK to place).

## 2019-02-25 NOTE — PROGRESS NOTES
Please call patient back with results.  The labs has resulted as normal - K is normal.    Thank you

## 2019-03-04 ENCOUNTER — TELEPHONE (OUTPATIENT)
Dept: FAMILY MEDICINE CLINIC | Facility: CLINIC | Age: 69
End: 2019-03-04

## 2019-03-04 NOTE — TELEPHONE ENCOUNTER
Pt's urology appointment isn't until this Friday. Pt's abdomen area starting cramping a few days ago. While urinating yesterday and today pt is experiencing a burning sensation and blood in urine. Is there anyway we can fit patient in possibly tomorrow?

## 2019-03-05 NOTE — TELEPHONE ENCOUNTER
FYI    Patient didn't want to schedule today as symptoms have subsided. She will keep her visit with urologist on Friday.

## 2019-08-23 ENCOUNTER — OFFICE VISIT (OUTPATIENT)
Dept: FAMILY MEDICINE CLINIC | Facility: CLINIC | Age: 69
End: 2019-08-23

## 2019-08-23 VITALS
RESPIRATION RATE: 14 BRPM | WEIGHT: 145.8 LBS | HEIGHT: 64 IN | OXYGEN SATURATION: 97 % | DIASTOLIC BLOOD PRESSURE: 84 MMHG | HEART RATE: 67 BPM | SYSTOLIC BLOOD PRESSURE: 132 MMHG | BODY MASS INDEX: 24.89 KG/M2

## 2019-08-23 DIAGNOSIS — E55.9 HYPOVITAMINOSIS D: ICD-10-CM

## 2019-08-23 DIAGNOSIS — I10 ESSENTIAL HYPERTENSION: Primary | ICD-10-CM

## 2019-08-23 PROCEDURE — 99213 OFFICE O/P EST LOW 20 MIN: CPT | Performed by: FAMILY MEDICINE

## 2019-08-23 NOTE — PROGRESS NOTES
Snow Anderson is a 69 y.o. female.     Chief Complaint   Patient presents with   • Hypertension     6 mo f/u       HPI     Pt is a pleasant 69 y.o. YO female here for HTN.    HTN: controlled.  Chronic problem for years, asymptomatic, no chest pain, palpitation or SOA.  Adherent with medications: Chlorthalidone 25mg QD.  No adverse effects from medications.  No palpitations.     The following portions of the patient's history were reviewed and updated as appropriate: allergies, current medications, past family history, past medical history, past social history, past surgical history and problem list.    Review of Systems   Constitutional: Negative for chills and fever.   Respiratory: Negative for shortness of breath.        Objective  Vitals:    08/23/19 0921   BP: 132/84   Pulse: 67   Resp: 14   SpO2: 97%        Physical Exam   Constitutional: She is oriented to person, place, and time. She appears well-developed and well-nourished. No distress.   HENT:   Head: Normocephalic.   Nose: Nose normal.   Eyes: EOM are normal. No scleral icterus.   Pulmonary/Chest: Effort normal. No stridor. No respiratory distress.   Musculoskeletal: Normal range of motion.   Neurological: She is alert and oriented to person, place, and time.   Skin: Skin is warm and dry. No rash noted.   Psychiatric: She has a normal mood and affect. Her behavior is normal. Judgment and thought content normal.   Nursing note and vitals reviewed.        Current Outpatient Medications:   •  aspirin 81 MG tablet, Take  by mouth every other day., Disp: , Rfl:   •  chlorthalidone (HYGROTON) 25 MG tablet, Take 1 tablet by mouth Daily., Disp: 10 tablet, Rfl: 0  •  Cholecalciferol (VITAMIN D) 1000 UNITS tablet, Take 2,000 Units by mouth., Disp: , Rfl:   •  fexofenadine (ALLEGRA ALLERGY) 180 MG tablet, , Disp: , Rfl:   •  fluticasone (FLONASE) 50 MCG/ACT nasal spray, 2 sprays by Each Nare route Daily., Disp: 48 g, Rfl: 3  •  montelukast (SINGULAIR) 10 MG  tablet, Take 1 tablet by mouth Every Night., Disp: 90 tablet, Rfl: 3  •  Multiple Vitamin (MULTI VITAMIN DAILY PO), Take  by mouth., Disp: , Rfl:   •  potassium chloride (K-DUR,KLOR-CON) 20 MEQ CR tablet, Take 1 tablet by mouth Daily., Disp: 10 tablet, Rfl: 0    Procedures    Lab Results (most recent)     None              Snow was seen today for hypertension.    Diagnoses and all orders for this visit:    Essential hypertension  -     Basic Metabolic Panel    Hypovitaminosis D  -     Vitamin D 25 Hydroxy      Hypertension well-controlled.  Continue with chlorthalidone 25 mg daily and potassium supplementation.  Will check BMP today.    Hypovitaminosis well-controlled, continue with current supplementation, will recheck levels today.        Return if symptoms worsen or fail to improve, for Medicare Wellness in December .      Lilo Koehler MD

## 2019-08-24 LAB
25(OH)D3+25(OH)D2 SERPL-MCNC: 76.8 NG/ML (ref 30–100)
BUN SERPL-MCNC: 17 MG/DL (ref 8–23)
BUN/CREAT SERPL: 17 (ref 7–25)
CALCIUM SERPL-MCNC: 9.2 MG/DL (ref 8.6–10.5)
CHLORIDE SERPL-SCNC: 99 MMOL/L (ref 98–107)
CO2 SERPL-SCNC: 33.3 MMOL/L (ref 22–29)
CREAT SERPL-MCNC: 1 MG/DL (ref 0.57–1)
GLUCOSE SERPL-MCNC: 86 MG/DL (ref 65–99)
POTASSIUM SERPL-SCNC: 4.4 MMOL/L (ref 3.5–5.2)
SODIUM SERPL-SCNC: 142 MMOL/L (ref 136–145)

## 2019-08-29 NOTE — PROGRESS NOTES
Please call patient back with results.  The labs has resulted as normal kidney function, liver function and vitamin D.  Okay to follow-up routine labs in 6 months.    Thank you

## 2019-12-11 ENCOUNTER — OFFICE VISIT (OUTPATIENT)
Dept: FAMILY MEDICINE CLINIC | Facility: CLINIC | Age: 69
End: 2019-12-11

## 2019-12-11 VITALS
TEMPERATURE: 98.4 F | WEIGHT: 147 LBS | HEIGHT: 64 IN | BODY MASS INDEX: 25.1 KG/M2 | DIASTOLIC BLOOD PRESSURE: 80 MMHG | OXYGEN SATURATION: 98 % | HEART RATE: 63 BPM | SYSTOLIC BLOOD PRESSURE: 150 MMHG

## 2019-12-11 DIAGNOSIS — E04.2 MULTIPLE THYROID NODULES: ICD-10-CM

## 2019-12-11 DIAGNOSIS — Z00.00 MEDICARE ANNUAL WELLNESS VISIT, SUBSEQUENT: Primary | ICD-10-CM

## 2019-12-11 DIAGNOSIS — I10 ESSENTIAL HYPERTENSION: ICD-10-CM

## 2019-12-11 DIAGNOSIS — E55.9 HYPOVITAMINOSIS D: ICD-10-CM

## 2019-12-11 DIAGNOSIS — R73.01 IMPAIRED FASTING GLUCOSE: ICD-10-CM

## 2019-12-11 PROCEDURE — G0439 PPPS, SUBSEQ VISIT: HCPCS | Performed by: FAMILY MEDICINE

## 2019-12-11 RX ORDER — ALBUTEROL SULFATE 90 UG/1
AEROSOL, METERED RESPIRATORY (INHALATION) EVERY 6 HOURS PRN
COMMUNITY
Start: 2019-10-02

## 2019-12-11 NOTE — PROGRESS NOTES
The ABCs of the Annual Wellness Visit  Subsequent Medicare Wellness Visit    Chief Complaint   Patient presents with   • Medicare Wellness-subsequent     complains about fall 3 weeks ago and has pain left hip and knee       Subjective   History of Present Illness:  Snow Anderson is a 69 y.o. female who presents for a Subsequent Medicare Wellness Visit.    HEALTH RISK ASSESSMENT    Recent Hospitalizations:  No hospitalization(s) within the last year.    Current Medical Providers:  Patient Care Team:  Lilo Koehler MD as PCP - General (Family Medicine)  Alban Isaac MD as PCP - Claims Attributed    Smoking Status:  Social History     Tobacco Use   Smoking Status Never Smoker   Smokeless Tobacco Never Used       Alcohol Consumption:  Social History     Substance and Sexual Activity   Alcohol Use Yes   • Alcohol/week: 1.0 standard drinks   • Types: 1 Glasses of wine per week    Comment: rare       Depression Screen:   PHQ-2/PHQ-9 Depression Screening 12/11/2019   Little interest or pleasure in doing things 0   Feeling down, depressed, or hopeless 0   Trouble falling or staying asleep, or sleeping too much -   Feeling tired or having little energy -   Poor appetite or overeating -   Feeling bad about yourself - or that you are a failure or have let yourself or your family down -   Trouble concentrating on things, such as reading the newspaper or watching television -   Moving or speaking so slowly that other people could have noticed. Or the opposite - being so fidgety or restless that you have been moving around a lot more than usual -   Thoughts that you would be better off dead, or of hurting yourself in some way -   Total Score 0   If you checked off any problems, how difficult have these problems made it for you to do your work, take care of things at home, or get along with other people? -       Fall Risk Screen:  STEADI Fall Risk Assessment was completed, and patient is at MODERATE risk for falls.  Assessment completed on:12/11/2019    Health Habits and Functional and Cognitive Screening:  Functional & Cognitive Status 12/11/2019   Do you have difficulty preparing food and eating? No   Do you have difficulty bathing yourself, getting dressed or grooming yourself? No   Do you have difficulty using the toilet? -   Do you have difficulty moving around from place to place? Yes   Do you have trouble with steps or getting out of a bed or a chair? No   Current Diet Well Balanced Diet   Dental Exam Up to date   Eye Exam Up to date   Exercise (times per week) 0 times per week   Current Exercise Activities Include None   Do you need help using the phone?  No   Are you deaf or do you have serious difficulty hearing?  No   Do you need help with transportation? No   Do you need help shopping? No   Do you need help preparing meals?  No   Do you need help with housework?  No   Do you need help with laundry? No   Do you need help taking your medications? No   Do you need help managing money? No   Do you ever drive or ride in a car without wearing a seat belt? No   Have you felt unusual stress, anger or loneliness in the last month? No   Who do you live with? Spouse   If you need help, do you have trouble finding someone available to you? No   Have you been bothered in the last four weeks by sexual problems? No   Do you have difficulty concentrating, remembering or making decisions? No         Does the patient have evidence of cognitive impairment? No, some issues with loosing her jimena of thought.  Some name recall issues.     Asprin use counseling:Start ASA 81 mg daily     Age-appropriate Screening Schedule:  Refer to the list below for future screening recommendations based on patient's age, sex and/or medical conditions. Orders for these recommended tests are listed in the plan section. The patient has been provided with a written plan.    Health Maintenance   Topic Date Due   • TDAP/TD VACCINES (1 - Tdap) 05/04/1961   •  ZOSTER VACCINE (2 of 3) 07/26/2017   • PNEUMOCOCCAL VACCINES (65+ LOW/MEDIUM RISK) (2 of 2 - PCV13) 11/23/2017   • MAMMOGRAM  07/02/2020   • DXA SCAN  07/02/2020   • COLONOSCOPY  06/08/2025   • INFLUENZA VACCINE  Completed          The following portions of the patient's history were reviewed and updated as appropriate: allergies, current medications, past family history, past medical history, past social history, past surgical history and problem list.    Outpatient Medications Prior to Visit   Medication Sig Dispense Refill   • albuterol sulfate  (90 Base) MCG/ACT inhaler      • aspirin 81 MG tablet Take  by mouth every other day.     • chlorthalidone (HYGROTON) 25 MG tablet Take 1 tablet by mouth Daily. 10 tablet 0   • Cholecalciferol (VITAMIN D) 1000 UNITS tablet Take 2,000 Units by mouth.     • fexofenadine (ALLEGRA ALLERGY) 180 MG tablet      • fluticasone (FLONASE) 50 MCG/ACT nasal spray 2 sprays by Each Nare route Daily. 48 g 3   • montelukast (SINGULAIR) 10 MG tablet Take 1 tablet by mouth Every Night. 90 tablet 3   • Multiple Vitamin (MULTI VITAMIN DAILY PO) Take  by mouth.     • potassium chloride (K-DUR,KLOR-CON) 20 MEQ CR tablet Take 1 tablet by mouth Daily. 10 tablet 0   • WIXELA INHUB 500-50 MCG/DOSE DISKUS        No facility-administered medications prior to visit.        Patient Active Problem List   Diagnosis   • Atopic rhinitis   • Carotid atherosclerosis   • Hypertension   • Impaired fasting glucose   • Hypovitaminosis D   • Diverticulosis of large intestine without hemorrhage   • Multiple thyroid nodules       Advanced Care Planning:  Patient has an advance directive - a copy has not been provided. Have asked the patient to send this to us to add to record    Review of Systems   Constitutional: Negative.    HENT: Negative.    Eyes: Negative.    Respiratory: Negative.    Cardiovascular: Negative for chest pain, palpitations and leg swelling.   Endocrine: Negative.    Genitourinary:  "Negative.    Musculoskeletal: Positive for arthralgias and myalgias.        Left hip from fall    Allergic/Immunologic: Negative.    Neurological: Negative.    Hematological: Negative.    Psychiatric/Behavioral: Negative.        Compared to one year ago, the patient feels her physical health is the same.  Compared to one year ago, the patient feels her mental health is the same.    Reviewed chart for potential of high risk medication in the elderly: yes  Reviewed chart for potential of harmful drug interactions in the elderly:yes    Objective         Vitals:    12/11/19 0913   BP: 150/80   Pulse: 63   Temp: 98.4 °F (36.9 °C)   TempSrc: Oral   SpO2: 98%   Weight: 66.7 kg (147 lb)   Height: 162.6 cm (64\")   PainSc:   4   PainLoc: Hip       Body mass index is 25.23 kg/m².  Discussed the patient's BMI with her. The BMI is in the acceptable range.    Physical Exam   Constitutional: She is oriented to person, place, and time. She appears well-developed and well-nourished. No distress.   HENT:   Head: Normocephalic.   Nose: Nose normal.   Eyes: EOM are normal.   Cardiovascular: Normal rate, regular rhythm, normal heart sounds and intact distal pulses.   No murmur heard.  Pulmonary/Chest: Effort normal and breath sounds normal. No respiratory distress.   Musculoskeletal: Normal range of motion.   Neurological: She is alert and oriented to person, place, and time.   Skin: Skin is warm and dry. No rash noted.   Psychiatric: She has a normal mood and affect. Her behavior is normal. Judgment and thought content normal.   Nursing note and vitals reviewed.            Assessment/Plan   Medicare Risks and Personalized Health Plan  CMS Preventative Services Quick Reference  Advance Directive Discussion  Cardiovascular risk  Dementia/Memory   Immunizations Discussed/Encouraged (specific immunizations; adacel Tdap )  Inadequate Social Support, Isolation, Loneliness, Lack of Transportation, Financial Difficulties, or Caregiver Stress "   Obesity/Overweight     The above risks/problems have been discussed with the patient.  Pertinent information has been shared with the patient in the After Visit Summary.  Follow up plans and orders are seen below in the Assessment/Plan Section.    Diagnoses and all orders for this visit:    1. Medicare annual wellness visit, subsequent (Primary)    2. Essential hypertension  -     Comprehensive Metabolic Panel  -     Lipid Panel    3. Hypovitaminosis D  -     Vitamin D 25 Hydroxy    4. Impaired fasting glucose  -     Hemoglobin A1c  -     Comprehensive Metabolic Panel  -     Lipid Panel    5. Multiple thyroid nodules  -     TSH  -     T4, free    Pleasant 69-year-old female here for Medicare wellness visit.  She is doing well, some caretaker stress with caring for her mother-in-law.  Otherwise no new symptoms.  Immunizations up-to-date.  She does want a Tdap for her grandchild but tetanus shot is otherwise up-to-date.  Recommended going to the pharmacy as she will have a better idea of what this will cost.  Patient with overall healthy lifestyle, exercises regularly and eats healthy.  Enjoys Harika and refit, continue with these.  GYN health up-to-date.  Follow-up in February for hyperglycemia and hypertension, labs ordered as above to be done before that appointment.    Follow Up:  Return in about 2 months (around 2/11/2020), or if symptoms worsen or fail to improve, for Recheck hypertension and hyperglycemia..     An After Visit Summary and PPPS were given to the patient.

## 2020-01-13 DIAGNOSIS — I10 ESSENTIAL HYPERTENSION: ICD-10-CM

## 2020-01-13 DIAGNOSIS — J30.89 NON-SEASONAL ALLERGIC RHINITIS, UNSPECIFIED TRIGGER: ICD-10-CM

## 2020-01-13 RX ORDER — CHLORTHALIDONE 25 MG/1
25 TABLET ORAL DAILY
Qty: 10 TABLET | Refills: 0 | Status: SHIPPED | OUTPATIENT
Start: 2020-01-13 | End: 2020-01-15 | Stop reason: SDUPTHER

## 2020-01-13 RX ORDER — MONTELUKAST SODIUM 10 MG/1
10 TABLET ORAL NIGHTLY
Qty: 90 TABLET | Refills: 3 | Status: SHIPPED | OUTPATIENT
Start: 2020-01-13 | End: 2020-07-22

## 2020-01-13 RX ORDER — POTASSIUM CHLORIDE 20 MEQ/1
20 TABLET, EXTENDED RELEASE ORAL DAILY
Qty: 10 TABLET | Refills: 0 | Status: SHIPPED | OUTPATIENT
Start: 2020-01-13 | End: 2020-01-15 | Stop reason: SDUPTHER

## 2020-01-13 RX ORDER — FLUTICASONE PROPIONATE 50 MCG
2 SPRAY, SUSPENSION (ML) NASAL DAILY
Qty: 48 G | Refills: 3 | Status: SHIPPED | OUTPATIENT
Start: 2020-01-13 | End: 2021-03-02

## 2020-01-15 DIAGNOSIS — I10 ESSENTIAL HYPERTENSION: ICD-10-CM

## 2020-01-15 RX ORDER — POTASSIUM CHLORIDE 20 MEQ/1
20 TABLET, EXTENDED RELEASE ORAL DAILY
Qty: 90 TABLET | Refills: 0 | Status: SHIPPED | OUTPATIENT
Start: 2020-01-15 | End: 2020-01-28 | Stop reason: SDUPTHER

## 2020-01-15 RX ORDER — CHLORTHALIDONE 25 MG/1
25 TABLET ORAL DAILY
Qty: 90 TABLET | Refills: 0 | Status: SHIPPED | OUTPATIENT
Start: 2020-01-15 | End: 2020-01-28 | Stop reason: SDUPTHER

## 2020-01-28 ENCOUNTER — OFFICE VISIT (OUTPATIENT)
Dept: FAMILY MEDICINE CLINIC | Facility: CLINIC | Age: 70
End: 2020-01-28

## 2020-01-28 VITALS
HEART RATE: 75 BPM | SYSTOLIC BLOOD PRESSURE: 132 MMHG | OXYGEN SATURATION: 98 % | DIASTOLIC BLOOD PRESSURE: 80 MMHG | BODY MASS INDEX: 25.1 KG/M2 | TEMPERATURE: 97.8 F | WEIGHT: 147 LBS | HEIGHT: 64 IN

## 2020-01-28 DIAGNOSIS — I10 ESSENTIAL HYPERTENSION: Primary | ICD-10-CM

## 2020-01-28 DIAGNOSIS — R73.01 IMPAIRED FASTING GLUCOSE: ICD-10-CM

## 2020-01-28 PROCEDURE — 99213 OFFICE O/P EST LOW 20 MIN: CPT | Performed by: FAMILY MEDICINE

## 2020-01-28 RX ORDER — CHLORTHALIDONE 25 MG/1
25 TABLET ORAL DAILY
Qty: 90 TABLET | Refills: 3 | Status: SHIPPED | OUTPATIENT
Start: 2020-01-28 | End: 2021-05-18

## 2020-01-28 RX ORDER — POTASSIUM CHLORIDE 20 MEQ/1
20 TABLET, EXTENDED RELEASE ORAL DAILY
Qty: 90 TABLET | Refills: 3 | Status: SHIPPED | OUTPATIENT
Start: 2020-01-28 | End: 2021-05-18

## 2020-01-28 NOTE — PROGRESS NOTES
Snow Anderson is a 69 y.o. female.     Chief Complaint   Patient presents with   • Hypertension     no complains        HPI     Pt is a pleasant 69 y.o. YO female here for HTN controlled and improved, stress improving with caretaker stress with aging mom.  Some L knee pain.     HTN: controlled.  Chronic problem for years, asymptomatic, no chest pain, palpitation or SOA.  Adherent with medications.  No adverse effects from medications.    The following portions of the patient's history were reviewed and updated as appropriate: allergies, current medications, past family history, past medical history, past social history, past surgical history and problem list.    Review of Systems   Constitutional: Negative.    HENT: Negative.    Eyes: Negative.    Respiratory: Negative.  Negative for apnea, choking and chest tightness.    Cardiovascular: Negative for chest pain, palpitations and leg swelling.   Gastrointestinal: Negative.    Endocrine: Negative.    Genitourinary: Negative.  Negative for decreased urine volume, menstrual problem, pelvic pain, urgency, vaginal bleeding, vaginal discharge and vaginal pain.   Musculoskeletal: Negative.    Skin: Negative.    Allergic/Immunologic: Negative.    Neurological: Negative.    Hematological: Negative.    Psychiatric/Behavioral: Negative.        Objective  Vitals:    01/28/20 0939   BP: 132/80   Pulse: 75   Temp: 97.8 °F (36.6 °C)   SpO2: 98%        Physical Exam   Constitutional: She is oriented to person, place, and time. She appears well-developed and well-nourished. No distress.   HENT:   Head: Normocephalic.   Nose: Nose normal.   Eyes: EOM are normal.   Cardiovascular: Normal rate, regular rhythm, normal heart sounds and intact distal pulses.   No murmur heard.  Pulmonary/Chest: Effort normal and breath sounds normal. No respiratory distress.   Musculoskeletal: Normal range of motion.   Neurological: She is alert and oriented to person, place, and time.   Skin: Skin  is warm and dry. No rash noted.   Psychiatric: She has a normal mood and affect. Her behavior is normal. Judgment and thought content normal.   Nursing note and vitals reviewed.        Current Outpatient Medications:   •  albuterol sulfate  (90 Base) MCG/ACT inhaler, , Disp: , Rfl:   •  aspirin 81 MG tablet, Take  by mouth every other day., Disp: , Rfl:   •  chlorthalidone (HYGROTON) 25 MG tablet, Take 1 tablet by mouth Daily., Disp: 90 tablet, Rfl: 3  •  Cholecalciferol (VITAMIN D) 1000 UNITS tablet, Take 2,000 Units by mouth., Disp: , Rfl:   •  fexofenadine (ALLEGRA ALLERGY) 180 MG tablet, , Disp: , Rfl:   •  fluticasone (FLONASE) 50 MCG/ACT nasal spray, 2 sprays by Each Nare route Daily., Disp: 48 g, Rfl: 3  •  montelukast (SINGULAIR) 10 MG tablet, Take 1 tablet by mouth Every Night., Disp: 90 tablet, Rfl: 3  •  Multiple Vitamin (MULTI VITAMIN DAILY PO), Take  by mouth., Disp: , Rfl:   •  potassium chloride (K-DUR,KLOR-CON) 20 MEQ CR tablet, Take 1 tablet by mouth Daily., Disp: 90 tablet, Rfl: 3  •  WIXELA INHUB 500-50 MCG/DOSE DISKUS, , Disp: , Rfl:     Procedures    Lab Results (most recent)     None              Snow was seen today for hypertension.    Diagnoses and all orders for this visit:    Essential hypertension  -     chlorthalidone (HYGROTON) 25 MG tablet; Take 1 tablet by mouth Daily.  -     potassium chloride (K-DUR,KLOR-CON) 20 MEQ CR tablet; Take 1 tablet by mouth Daily.    Impaired fasting glucose    HTN well controlled, continue meds as above     Hyperglycemia that has waxed and waned over the past few years.  Will get labs previously ordered.    Return in about 6 months (around 7/28/2020), or if symptoms worsen or fail to improve, for Recheck HTN and hyperglycemia .      Lilo Koehler MD

## 2020-01-29 LAB
25(OH)D3+25(OH)D2 SERPL-MCNC: 58.7 NG/ML (ref 30–100)
ALBUMIN SERPL-MCNC: 4.2 G/DL (ref 3.5–5.2)
ALBUMIN/GLOB SERPL: 1.9 G/DL
ALP SERPL-CCNC: 83 U/L (ref 39–117)
ALT SERPL-CCNC: 24 U/L (ref 1–33)
AST SERPL-CCNC: 30 U/L (ref 1–32)
BILIRUB SERPL-MCNC: 0.6 MG/DL (ref 0.2–1.2)
BUN SERPL-MCNC: 19 MG/DL (ref 8–23)
BUN/CREAT SERPL: 17.3 (ref 7–25)
CALCIUM SERPL-MCNC: 9.7 MG/DL (ref 8.6–10.5)
CHLORIDE SERPL-SCNC: 98 MMOL/L (ref 98–107)
CHOLEST SERPL-MCNC: 202 MG/DL (ref 0–200)
CO2 SERPL-SCNC: 32.1 MMOL/L (ref 22–29)
CREAT SERPL-MCNC: 1.1 MG/DL (ref 0.57–1)
GLOBULIN SER CALC-MCNC: 2.2 GM/DL
GLUCOSE SERPL-MCNC: 82 MG/DL (ref 65–99)
HBA1C MFR BLD: 6.1 % (ref 4.8–5.6)
HDLC SERPL-MCNC: 93 MG/DL (ref 40–60)
LDLC SERPL CALC-MCNC: 97 MG/DL (ref 0–100)
POTASSIUM SERPL-SCNC: 3.9 MMOL/L (ref 3.5–5.2)
PROT SERPL-MCNC: 6.4 G/DL (ref 6–8.5)
SODIUM SERPL-SCNC: 140 MMOL/L (ref 136–145)
T4 FREE SERPL-MCNC: 1.35 NG/DL (ref 0.93–1.7)
TRIGL SERPL-MCNC: 60 MG/DL (ref 0–150)
TSH SERPL DL<=0.005 MIU/L-ACNC: 3.38 UIU/ML (ref 0.27–4.2)
VLDLC SERPL CALC-MCNC: 12 MG/DL

## 2020-05-21 ENCOUNTER — PATIENT MESSAGE (OUTPATIENT)
Dept: FAMILY MEDICINE CLINIC | Facility: CLINIC | Age: 70
End: 2020-05-21

## 2020-05-21 DIAGNOSIS — I10 ESSENTIAL HYPERTENSION: ICD-10-CM

## 2020-05-21 DIAGNOSIS — R73.01 IMPAIRED FASTING GLUCOSE: Primary | ICD-10-CM

## 2020-05-21 NOTE — TELEPHONE ENCOUNTER
"From: Snow Anderson  To: Lilo Koehler MD  Sent: 5/21/2020 1:49 PM EDT  Subject: Test Results Question    I was supposed to get follow-up blood work (I think especially A1c and cholesterol were of specific interest) back in April or early May, but since it was \"routine\" I did not pursue it then. Do I need to get this done now or wait until my July appointment with Dr Koehler?   IF now, do I make a formal appointment to get the blood drawn or is there another type of appointment? It is not clear on this website. I will be out of town May 27 - [place] 10 so I will need to work around those dates. Please let me know how to proceed. Thanks.  Malissa Anderson  "

## 2020-07-18 LAB
ALBUMIN SERPL-MCNC: 4.4 G/DL (ref 3.5–5.2)
ALBUMIN/GLOB SERPL: 2.1 G/DL
ALP SERPL-CCNC: 83 U/L (ref 39–117)
ALT SERPL-CCNC: 13 U/L (ref 1–33)
AST SERPL-CCNC: 21 U/L (ref 1–32)
BILIRUB SERPL-MCNC: 0.4 MG/DL (ref 0–1.2)
BUN SERPL-MCNC: 25 MG/DL (ref 8–23)
BUN/CREAT SERPL: 25 (ref 7–25)
CALCIUM SERPL-MCNC: 9.5 MG/DL (ref 8.6–10.5)
CHLORIDE SERPL-SCNC: 100 MMOL/L (ref 98–107)
CHOLEST SERPL-MCNC: 206 MG/DL (ref 0–200)
CO2 SERPL-SCNC: 34.7 MMOL/L (ref 22–29)
CREAT SERPL-MCNC: 1 MG/DL (ref 0.57–1)
FOLATE SERPL-MCNC: 11.8 NG/ML (ref 4.78–24.2)
GLOBULIN SER CALC-MCNC: 2.1 GM/DL
GLUCOSE SERPL-MCNC: 89 MG/DL (ref 65–99)
HBA1C MFR BLD: 5.6 % (ref 4.8–5.6)
HDLC SERPL-MCNC: 92 MG/DL (ref 40–60)
LDLC SERPL CALC-MCNC: 105 MG/DL (ref 0–100)
POTASSIUM SERPL-SCNC: 4.1 MMOL/L (ref 3.5–5.2)
PROT SERPL-MCNC: 6.5 G/DL (ref 6–8.5)
SODIUM SERPL-SCNC: 142 MMOL/L (ref 136–145)
TRIGL SERPL-MCNC: 47 MG/DL (ref 0–150)
VIT B12 SERPL-MCNC: 397 PG/ML (ref 211–946)
VLDLC SERPL CALC-MCNC: 9.4 MG/DL

## 2020-07-22 ENCOUNTER — OFFICE VISIT (OUTPATIENT)
Dept: FAMILY MEDICINE CLINIC | Facility: CLINIC | Age: 70
End: 2020-07-22

## 2020-07-22 VITALS
HEART RATE: 62 BPM | TEMPERATURE: 98.5 F | DIASTOLIC BLOOD PRESSURE: 76 MMHG | BODY MASS INDEX: 24.41 KG/M2 | SYSTOLIC BLOOD PRESSURE: 122 MMHG | OXYGEN SATURATION: 99 % | HEIGHT: 64 IN | WEIGHT: 143 LBS

## 2020-07-22 DIAGNOSIS — R73.01 IMPAIRED FASTING GLUCOSE: ICD-10-CM

## 2020-07-22 DIAGNOSIS — E04.2 MULTIPLE THYROID NODULES: ICD-10-CM

## 2020-07-22 DIAGNOSIS — E55.9 HYPOVITAMINOSIS D: ICD-10-CM

## 2020-07-22 DIAGNOSIS — I10 ESSENTIAL HYPERTENSION: Primary | ICD-10-CM

## 2020-07-22 DIAGNOSIS — E78.49 OTHER HYPERLIPIDEMIA: ICD-10-CM

## 2020-07-22 PROCEDURE — 99214 OFFICE O/P EST MOD 30 MIN: CPT | Performed by: FAMILY MEDICINE

## 2020-07-22 RX ORDER — BUDESONIDE AND FORMOTEROL FUMARATE DIHYDRATE 160; 4.5 UG/1; UG/1
AEROSOL RESPIRATORY (INHALATION)
COMMUNITY
Start: 2020-06-01 | End: 2020-12-16

## 2020-07-22 RX ORDER — ESTRADIOL 0.1 MG/G
CREAM VAGINAL
COMMUNITY
Start: 2020-07-16 | End: 2020-12-16

## 2020-07-22 RX ORDER — AZELASTINE HYDROCHLORIDE 137 UG/1
SPRAY, METERED NASAL AS NEEDED
COMMUNITY
Start: 2020-06-15

## 2020-07-22 RX ORDER — FAMOTIDINE 40 MG/1
40 TABLET, FILM COATED ORAL 2 TIMES DAILY
COMMUNITY
Start: 2020-06-24 | End: 2022-01-06

## 2020-07-22 RX ORDER — ATORVASTATIN CALCIUM 10 MG/1
10 TABLET, FILM COATED ORAL DAILY
Qty: 90 TABLET | Refills: 1 | Status: SHIPPED | OUTPATIENT
Start: 2020-07-22 | End: 2021-01-12

## 2020-07-22 NOTE — PROGRESS NOTES
Snow Anderson is a 70 y.o. female.     Chief Complaint   Patient presents with   • Hyperlipidemia     follow up after blood work    • Hypertension     follow up after blood work       HPI     Pt is a pleasant 70 y.o. YO female here for follow-up hypertension well controlled, hyperglycemia well controlled, hyperlipidemia well-controlled.  She does have known carotid artery atherosclerosis, on aspirin 81 mg daily for this.    HTN: controlled.  Chronic problem for years, asymptomatic, no chest pain, palpitation or SOA.  Adherent with medications: Chlorthalidone 25 mg daily.  No adverse effects from medications.    Hyperlipidemia not well controlled, reviewed labs with patient.  ASCVD of 11.7.  Never been on statin prior.  Known carotid atherosclerosis.  She has taken aspirin 81 daily for this.    The following portions of the patient's history were reviewed and updated as appropriate: allergies, current medications, past family history, past medical history, past social history, past surgical history and problem list.    Review of Systems   Constitutional: Negative.    HENT: Negative.    Eyes: Negative.    Respiratory: Positive for chest tightness.    Cardiovascular: Negative for chest pain, palpitations and leg swelling.   Gastrointestinal: Negative.    Endocrine: Negative.    Genitourinary: Negative.    Musculoskeletal: Negative.    Skin: Negative.    Allergic/Immunologic: Positive for environmental allergies.   Psychiatric/Behavioral: Positive for sleep disturbance.       Objective  Vitals:    07/22/20 0925   BP: 122/76   Pulse: 62   Temp: 98.5 °F (36.9 °C)   SpO2: 99%        Physical Exam   Constitutional: She is oriented to person, place, and time. She appears well-developed and well-nourished. No distress.   HENT:   Head: Normocephalic.   Nose: Nose normal.   Eyes: EOM are normal. No scleral icterus.   Pulmonary/Chest: Effort normal. No respiratory distress.   Musculoskeletal: Normal range of motion.    Neurological: She is alert and oriented to person, place, and time.   Skin: Skin is warm and dry. No rash noted.   Psychiatric: She has a normal mood and affect. Her behavior is normal. Judgment and thought content normal.   Nursing note and vitals reviewed.        Current Outpatient Medications:   •  albuterol sulfate  (90 Base) MCG/ACT inhaler, , Disp: , Rfl:   •  aspirin 81 MG tablet, Take  by mouth every other day., Disp: , Rfl:   •  Azelastine HCl 137 MCG/SPRAY solution, , Disp: , Rfl:   •  budesonide-formoterol (Symbicort) 160-4.5 MCG/ACT inhaler, , Disp: , Rfl:   •  chlorthalidone (HYGROTON) 25 MG tablet, Take 1 tablet by mouth Daily., Disp: 90 tablet, Rfl: 3  •  Cholecalciferol (VITAMIN D) 1000 UNITS tablet, Take 2,000 Units by mouth., Disp: , Rfl:   •  fexofenadine (ALLEGRA ALLERGY) 180 MG tablet, , Disp: , Rfl:   •  fluticasone (FLONASE) 50 MCG/ACT nasal spray, 2 sprays by Each Nare route Daily., Disp: 48 g, Rfl: 3  •  Multiple Vitamin (MULTI VITAMIN DAILY PO), Take  by mouth., Disp: , Rfl:   •  potassium chloride (K-DUR,KLOR-CON) 20 MEQ CR tablet, Take 1 tablet by mouth Daily., Disp: 90 tablet, Rfl: 3  •  atorvastatin (LIPITOR) 10 MG tablet, Take 1 tablet by mouth Daily., Disp: 90 tablet, Rfl: 1  •  estradiol (ESTRACE) 0.1 MG/GM vaginal cream, , Disp: , Rfl:   •  famotidine (PEPCID) 40 MG tablet, Take 40 mg by mouth 2 (Two) Times a Day., Disp: , Rfl:     Procedures    Lab Results (most recent)     None              Snow was seen today for hyperlipidemia and hypertension.    Diagnoses and all orders for this visit:    Essential hypertension  -     Comprehensive Metabolic Panel  -     CBC & Differential    Hypovitaminosis D  -     Vitamin D 25 Hydroxy    Impaired fasting glucose  -     Hemoglobin A1c  -     Vitamin B12 & Folate    Multiple thyroid nodules  -     T3, Free  -     TSH  -     T4, Free    Other hyperlipidemia  -     Lipid Panel    Other orders  -     atorvastatin (LIPITOR) 10 MG  tablet; Take 1 tablet by mouth Daily.      Pleasant 70-year-old female here for routine follow-up for hypertension that is well controlled, hyperlipidemia well-controlled and hyperglycemia that is well controlled.  Continue with current meds no changes.  Known thyroid nodules.  Thyroid labs ordered today.  Continue with healthy diet and exercise and follow-up in 6 months for Medicare wellness visit.    HLD worsening - ASCVD 11.7%, known carotid artery stenosis.  Start atorvastatin 10 mg daily, discussed risk benefits and alternatives, adverse effects discussed with patient.  She will follow-up if any symptoms of adverse effect.  Otherwise follow-up in 6 months with fasting labs prior.    Return in about 6 months (around 1/22/2021), or if symptoms worsen or fail to improve, for Medicare Wellness.      Lilo Koehler MD

## 2020-12-16 ENCOUNTER — OFFICE VISIT (OUTPATIENT)
Dept: FAMILY MEDICINE CLINIC | Facility: CLINIC | Age: 70
End: 2020-12-16

## 2020-12-16 VITALS
HEART RATE: 60 BPM | DIASTOLIC BLOOD PRESSURE: 70 MMHG | WEIGHT: 140 LBS | OXYGEN SATURATION: 98 % | HEIGHT: 64 IN | SYSTOLIC BLOOD PRESSURE: 124 MMHG | TEMPERATURE: 98 F | BODY MASS INDEX: 23.9 KG/M2

## 2020-12-16 DIAGNOSIS — Z78.0 POSTMENOPAUSAL: ICD-10-CM

## 2020-12-16 DIAGNOSIS — R05.9 COUGH: ICD-10-CM

## 2020-12-16 DIAGNOSIS — Z00.00 MEDICARE ANNUAL WELLNESS VISIT, SUBSEQUENT: Primary | ICD-10-CM

## 2020-12-16 DIAGNOSIS — Z12.31 ENCOUNTER FOR SCREENING MAMMOGRAM FOR MALIGNANT NEOPLASM OF BREAST: ICD-10-CM

## 2020-12-16 DIAGNOSIS — K21.9 GASTROESOPHAGEAL REFLUX DISEASE, UNSPECIFIED WHETHER ESOPHAGITIS PRESENT: ICD-10-CM

## 2020-12-16 PROCEDURE — 99213 OFFICE O/P EST LOW 20 MIN: CPT | Performed by: FAMILY MEDICINE

## 2020-12-16 PROCEDURE — G0439 PPPS, SUBSEQ VISIT: HCPCS | Performed by: FAMILY MEDICINE

## 2020-12-16 RX ORDER — INFLUENZA A VIRUS A/MICHIGAN/45/2015 X-275 (H1N1) ANTIGEN (FORMALDEHYDE INACTIVATED), INFLUENZA A VIRUS A/SINGAPORE/INFIMH-16-0019/2016 IVR-186 (H3N2) ANTIGEN (FORMALDEHYDE INACTIVATED), INFLUENZA B VIRUS B/PHUKET/3073/2013 ANTIGEN (FORMALDEHYDE INACTIVATED), AND INFLUENZA B VIRUS B/MARYLAND/15/2016 BX-69A ANTIGEN (FORMALDEHYDE INACTIVATED) 60; 60; 60; 60 UG/.7ML; UG/.7ML; UG/.7ML; UG/.7ML
INJECTION, SUSPENSION INTRAMUSCULAR
COMMUNITY
Start: 2020-10-03 | End: 2020-12-16

## 2020-12-16 NOTE — PROGRESS NOTES
The ABCs of the Annual Wellness Visit  Subsequent Medicare Wellness Visit    Chief Complaint   Patient presents with   • Medicare Wellness-subsequent     no complains        Subjective   History of Present Illness:  Snow Anderson is a 70 y.o. female who presents for a Subsequent Medicare Wellness Visit.  Pt has had a croupy cough that occurs once a month with increased mucus and lasts a couple of days.  She does see an allergist, gets allergy shots, does not feel like it helps.  She has had indigestion and had improvement with Pepcid 40.  She has been noticing continued symptoms.  Has used a wedge pillow without much help.    HEALTH RISK ASSESSMENT    Recent Hospitalizations:  No hospitalization(s) within the last year.    Current Medical Providers:  Patient Care Team:  Lilo Koehler MD as PCP - General (Family Medicine)    Smoking Status:  Social History     Tobacco Use   Smoking Status Never Smoker   Smokeless Tobacco Never Used       Alcohol Consumption:  Social History     Substance and Sexual Activity   Alcohol Use Yes   • Alcohol/week: 1.0 standard drinks   • Types: 1 Glasses of wine per week    Comment: rare       Depression Screen:   PHQ-2/PHQ-9 Depression Screening 12/16/2020   Little interest or pleasure in doing things 0   Feeling down, depressed, or hopeless 0   Trouble falling or staying asleep, or sleeping too much -   Feeling tired or having little energy -   Poor appetite or overeating -   Feeling bad about yourself - or that you are a failure or have let yourself or your family down -   Trouble concentrating on things, such as reading the newspaper or watching television -   Moving or speaking so slowly that other people could have noticed. Or the opposite - being so fidgety or restless that you have been moving around a lot more than usual -   Thoughts that you would be better off dead, or of hurting yourself in some way -   Total Score 0   If you checked off any problems, how difficult have  these problems made it for you to do your work, take care of things at home, or get along with other people? -       Fall Risk Screen:  NOELLE Fall Risk Assessment was completed, and patient is at LOW risk for falls.Assessment completed on:12/16/2020    Health Habits and Functional and Cognitive Screening:  Functional & Cognitive Status 12/16/2020   Do you have difficulty preparing food and eating? No   Do you have difficulty bathing yourself, getting dressed or grooming yourself? No   Do you have difficulty using the toilet? No   Do you have difficulty moving around from place to place? No   Do you have trouble with steps or getting out of a bed or a chair? Yes   Current Diet -   Dental Exam Up to date   Eye Exam Up to date   Exercise (times per week) 6 times per week   Current Exercise Activities Include Walking   Do you need help using the phone?  No   Are you deaf or do you have serious difficulty hearing?  No   Do you need help with transportation? No   Do you need help shopping? No   Do you need help preparing meals?  No   Do you need help with housework?  No   Do you need help with laundry? No   Do you need help taking your medications? No   Do you need help managing money? No   Do you ever drive or ride in a car without wearing a seat belt? No   Have you felt unusual stress, anger or loneliness in the last month? No   Who do you live with? Spouse   If you need help, do you have trouble finding someone available to you? No   Have you been bothered in the last four weeks by sexual problems? No   Do you have difficulty concentrating, remembering or making decisions? No         Does the patient have evidence of cognitive impairment? No    Asprin use counseling:Taking ASA appropriately as indicated    Age-appropriate Screening Schedule:  Refer to the list below for future screening recommendations based on patient's age, sex and/or medical conditions. Orders for these recommended tests are listed in the plan  section. The patient has been provided with a written plan.    Health Maintenance   Topic Date Due   • ZOSTER VACCINE (2 of 3) 07/26/2017   • MAMMOGRAM  07/02/2020   • DXA SCAN  07/02/2020   • LIPID PANEL  07/17/2021   • COLONOSCOPY  06/08/2025   • TDAP/TD VACCINES (2 - Td) 04/25/2030   • INFLUENZA VACCINE  Completed          The following portions of the patient's history were reviewed and updated as appropriate: allergies, current medications, past family history, past medical history, past social history, past surgical history and problem list.    Outpatient Medications Prior to Visit   Medication Sig Dispense Refill   • albuterol sulfate  (90 Base) MCG/ACT inhaler      • aspirin 81 MG tablet Take  by mouth every other day.     • atorvastatin (LIPITOR) 10 MG tablet Take 1 tablet by mouth Daily. 90 tablet 1   • Azelastine HCl 137 MCG/SPRAY solution      • chlorthalidone (HYGROTON) 25 MG tablet Take 1 tablet by mouth Daily. 90 tablet 3   • Cholecalciferol (VITAMIN D) 1000 UNITS tablet Take 2,000 Units by mouth.     • famotidine (PEPCID) 40 MG tablet Take 40 mg by mouth 2 (Two) Times a Day.     • fexofenadine (ALLEGRA ALLERGY) 180 MG tablet      • fluticasone (FLONASE) 50 MCG/ACT nasal spray 2 sprays by Each Nare route Daily. 48 g 3   • fluticasone-salmeterol (ADVAIR) 250-50 MCG/DOSE DISKUS INHALE 1 PUFF TWICE DAILY IN THE MORNING AND THE EVENING APPROXIMATELY 12 HOURS APART RINSE MOUTH AFTER USE     • hydrocortisone 2.5 % cream APPLY THIN LAYER TO AFFECTED AREA TWICE DAILY     • Multiple Vitamin (MULTI VITAMIN DAILY PO) Take  by mouth.     • potassium chloride (K-DUR,KLOR-CON) 20 MEQ CR tablet Take 1 tablet by mouth Daily. 90 tablet 3   • budesonide-formoterol (Symbicort) 160-4.5 MCG/ACT inhaler      • estradiol (ESTRACE) 0.1 MG/GM vaginal cream      • Fluzone High-Dose Quadrivalent 0.7 ML suspension prefilled syringe PHARMACIST ADMINISTERED IMMUNIZATION ADMINISTERED AT TIME OF DISPENSING       No  facility-administered medications prior to visit.        Patient Active Problem List   Diagnosis   • Atopic rhinitis   • Carotid atherosclerosis   • Hypertension   • Impaired fasting glucose   • Hypovitaminosis D   • Diverticulosis of large intestine without hemorrhage   • Multiple thyroid nodules       Advanced Care Planning:  ACP discussion was held with the patient during this visit. Patient has an advance directive (not in EMR), copy requested.    Review of Systems   Constitutional: Negative for chills, fatigue, fever and unexpected weight change.   HENT: Negative for ear pain, hearing loss, sinus pressure, sore throat and tinnitus.    Eyes: Negative for pain, discharge and redness.   Respiratory: Negative for cough, shortness of breath and wheezing.    Cardiovascular: Negative for chest pain, palpitations and leg swelling.   Gastrointestinal: Negative for abdominal pain, constipation, diarrhea and nausea.   Endocrine: Negative for cold intolerance and heat intolerance.   Genitourinary: Negative for difficulty urinating, flank pain and urgency.   Musculoskeletal: Positive for arthralgias. Negative for back pain, joint swelling and myalgias.   Skin: Negative for rash and wound.   Allergic/Immunologic: Negative for environmental allergies and food allergies.   Neurological: Negative for dizziness, seizures, numbness and headaches.   Hematological: Negative for adenopathy. Does not bruise/bleed easily.   Psychiatric/Behavioral: Negative for decreased concentration, dysphoric mood and sleep disturbance. The patient is not nervous/anxious.    All other systems reviewed and are negative.      Compared to one year ago, the patient feels her physical health is worse.  Compared to one year ago, the patient feels her mental health is the same.    Reviewed chart for potential of high risk medication in the elderly: yes  Reviewed chart for potential of harmful drug interactions in the elderly:yes    Objective        "  Vitals:    12/16/20 0932   BP: 124/70   Pulse: 60   Temp: 98 °F (36.7 °C)   SpO2: 98%   Weight: 63.5 kg (140 lb)   Height: 162.6 cm (64\")   PainSc: 0-No pain       Body mass index is 24.03 kg/m².  Discussed the patient's BMI with her. The BMI is in the acceptable range.    Physical Exam  Vitals signs and nursing note reviewed.   Constitutional:       General: She is not in acute distress.     Appearance: She is well-developed.   HENT:      Head: Normocephalic.      Nose: Nose normal.   Cardiovascular:      Rate and Rhythm: Normal rate and regular rhythm.      Heart sounds: Normal heart sounds. No murmur.   Pulmonary:      Effort: Pulmonary effort is normal. No respiratory distress.      Breath sounds: Normal breath sounds.   Musculoskeletal: Normal range of motion.   Skin:     General: Skin is warm and dry.      Findings: No rash.   Neurological:      Mental Status: She is alert and oriented to person, place, and time.   Psychiatric:         Behavior: Behavior normal.         Thought Content: Thought content normal.         Judgment: Judgment normal.               Assessment/Plan   Medicare Risks and Personalized Health Plan  CMS Preventative Services Quick Reference  Advance Directive Discussion  Breast Cancer/Mammogram Screening  Inadequate Social Support, Isolation, Loneliness, Lack of Transportation, Financial Difficulties, or Caregiver Stress   Osteoprorosis Risk    The above risks/problems have been discussed with the patient.  Pertinent information has been shared with the patient in the After Visit Summary.  Follow up plans and orders are seen below in the Assessment/Plan Section.    Diagnoses and all orders for this visit:    1. Medicare annual wellness visit, subsequent (Primary)    2. Postmenopausal  -     DEXA Bone Density Axial; Future    3. Encounter for screening mammogram for malignant neoplasm of breast  -     Mammo Screening Bilateral With CAD; Future    4. Gastroesophageal reflux disease, " unspecified whether esophagitis present    5. Cough      Pleasant 70-year-old female here for Medicare wellness visit.  Labs ordered previously to be performed today.  Fasting.  Immunizations up-to-date, due for mammogram and DEXA scan.  Ordered as above.  Continue to follow-up with GYN but had some issues with follow-up due to Covid.  She will resume follow-up with her gynecologist after that.  Therefore test will be ordered at [place].    Patient with symptoms of cough that has been chronic over the last 4 to 5 months.  It tends to be episodic, possibly associated with eating pizza.  She has seen her allergist multiple times with no improvement with Mucinex, Singulair, allergy shots.  I think that her symptoms are likely GERD.  She is taking famotidine 40.  I discussed starting a PPI but she is concerned about the risks of adverse effects.  Therefore handout from [facility] with lifestyle management and various foods to avoid for the next 2 months to see if her symptoms alleviate.  If not improving will follow-up sooner for further evaluation otherwise in 6 months for hypertension    Follow Up:  Return in about 6 months (around 6/16/2021), or if symptoms worsen or fail to improve, for Recheck HTN.     An After Visit Summary and PPPS were given to the patient.     Lilo Koehler MD    Mask and eyewear protection worn by myself and medical staff during entire encounter.        Answers for HPI/ROS submitted by the patient on 12/9/2020   What is the primary reason for your visit?: Other  Please describe your symptoms.: Medicare visit plus sinus drainage and morning cough  Have you had these symptoms before?: Yes  How long have you been having these symptoms?: Greater than 2 weeks  Please list any medications you are currently taking for this condition.: Allegra daily, Fluticasone propionate nasal spray daily, Alastovastin nasal spray as needed, Mucinex as needed  Please describe any probable cause for these  symptoms. : Peak flow is good so not asthma, May be due to increase mold exposure ( allergy shots have been very reactive, Have some reflux occasionally but the 40 mg Pepsi’s seems to have taken care of indigestion and I don’t eat or drink at least 2 hours before bedtime.

## 2020-12-17 LAB
25(OH)D3+25(OH)D2 SERPL-MCNC: 82.3 NG/ML (ref 30–100)
ALBUMIN SERPL-MCNC: 4.5 G/DL (ref 3.5–5.2)
ALBUMIN/GLOB SERPL: 1.8 G/DL
ALP SERPL-CCNC: 122 U/L (ref 39–117)
ALT SERPL-CCNC: 25 U/L (ref 1–33)
AST SERPL-CCNC: 28 U/L (ref 1–32)
BASOPHILS # BLD AUTO: 0.04 10*3/MM3 (ref 0–0.2)
BASOPHILS NFR BLD AUTO: 0.8 % (ref 0–1.5)
BILIRUB SERPL-MCNC: 0.6 MG/DL (ref 0–1.2)
BUN SERPL-MCNC: 15 MG/DL (ref 8–23)
BUN/CREAT SERPL: 15.3 (ref 7–25)
CALCIUM SERPL-MCNC: 9.9 MG/DL (ref 8.6–10.5)
CHLORIDE SERPL-SCNC: 97 MMOL/L (ref 98–107)
CHOLEST SERPL-MCNC: 152 MG/DL (ref 0–200)
CO2 SERPL-SCNC: 34.8 MMOL/L (ref 22–29)
CREAT SERPL-MCNC: 0.98 MG/DL (ref 0.57–1)
EOSINOPHIL # BLD AUTO: 0.1 10*3/MM3 (ref 0–0.4)
EOSINOPHIL NFR BLD AUTO: 1.9 % (ref 0.3–6.2)
ERYTHROCYTE [DISTWIDTH] IN BLOOD BY AUTOMATED COUNT: 11.9 % (ref 12.3–15.4)
FOLATE SERPL-MCNC: 19.9 NG/ML (ref 4.78–24.2)
GLOBULIN SER CALC-MCNC: 2.5 GM/DL
GLUCOSE SERPL-MCNC: 92 MG/DL (ref 65–99)
HBA1C MFR BLD: 5.79 % (ref 4.8–5.6)
HCT VFR BLD AUTO: 40.2 % (ref 34–46.6)
HDLC SERPL-MCNC: 93 MG/DL (ref 40–60)
HGB BLD-MCNC: 13.1 G/DL (ref 12–15.9)
IMM GRANULOCYTES # BLD AUTO: 0.01 10*3/MM3 (ref 0–0.05)
IMM GRANULOCYTES NFR BLD AUTO: 0.2 % (ref 0–0.5)
LDLC SERPL CALC-MCNC: 49 MG/DL (ref 0–100)
LYMPHOCYTES # BLD AUTO: 1.24 10*3/MM3 (ref 0.7–3.1)
LYMPHOCYTES NFR BLD AUTO: 23.8 % (ref 19.6–45.3)
MCH RBC QN AUTO: 29.8 PG (ref 26.6–33)
MCHC RBC AUTO-ENTMCNC: 32.6 G/DL (ref 31.5–35.7)
MCV RBC AUTO: 91.4 FL (ref 79–97)
MONOCYTES # BLD AUTO: 0.46 10*3/MM3 (ref 0.1–0.9)
MONOCYTES NFR BLD AUTO: 8.8 % (ref 5–12)
NEUTROPHILS # BLD AUTO: 3.37 10*3/MM3 (ref 1.7–7)
NEUTROPHILS NFR BLD AUTO: 64.5 % (ref 42.7–76)
NRBC BLD AUTO-RTO: 0 /100 WBC (ref 0–0.2)
PLATELET # BLD AUTO: 263 10*3/MM3 (ref 140–450)
POTASSIUM SERPL-SCNC: 4 MMOL/L (ref 3.5–5.2)
PROT SERPL-MCNC: 7 G/DL (ref 6–8.5)
RBC # BLD AUTO: 4.4 10*6/MM3 (ref 3.77–5.28)
SODIUM SERPL-SCNC: 140 MMOL/L (ref 136–145)
T3FREE SERPL-MCNC: 3.3 PG/ML (ref 2–4.4)
T4 FREE SERPL-MCNC: 1.4 NG/DL (ref 0.93–1.7)
TRIGL SERPL-MCNC: 42 MG/DL (ref 0–150)
TSH SERPL DL<=0.005 MIU/L-ACNC: 2.88 UIU/ML (ref 0.27–4.2)
VIT B12 SERPL-MCNC: 646 PG/ML (ref 211–946)
VLDLC SERPL CALC-MCNC: 10 MG/DL (ref 5–40)
WBC # BLD AUTO: 5.22 10*3/MM3 (ref 3.4–10.8)

## 2021-01-12 RX ORDER — ATORVASTATIN CALCIUM 10 MG/1
TABLET, FILM COATED ORAL
Qty: 90 TABLET | Refills: 1 | Status: SHIPPED | OUTPATIENT
Start: 2021-01-12 | End: 2021-07-12

## 2021-02-05 DIAGNOSIS — Z78.0 POSTMENOPAUSAL: ICD-10-CM

## 2021-02-05 DIAGNOSIS — Z12.31 ENCOUNTER FOR SCREENING MAMMOGRAM FOR MALIGNANT NEOPLASM OF BREAST: ICD-10-CM

## 2021-03-02 DIAGNOSIS — J30.89 NON-SEASONAL ALLERGIC RHINITIS, UNSPECIFIED TRIGGER: ICD-10-CM

## 2021-03-02 RX ORDER — FLUTICASONE PROPIONATE 50 MCG
SPRAY, SUSPENSION (ML) NASAL
Qty: 48 G | Refills: 0 | Status: SHIPPED | OUTPATIENT
Start: 2021-03-02 | End: 2021-06-17

## 2021-05-15 DIAGNOSIS — I10 ESSENTIAL HYPERTENSION: ICD-10-CM

## 2021-05-18 RX ORDER — POTASSIUM CHLORIDE 20 MEQ/1
TABLET, EXTENDED RELEASE ORAL
Qty: 90 TABLET | Refills: 0 | Status: SHIPPED | OUTPATIENT
Start: 2021-05-18 | End: 2021-06-17 | Stop reason: SDUPTHER

## 2021-05-18 RX ORDER — CHLORTHALIDONE 25 MG/1
TABLET ORAL
Qty: 90 TABLET | Refills: 0 | Status: SHIPPED | OUTPATIENT
Start: 2021-05-18 | End: 2021-06-17 | Stop reason: SDUPTHER

## 2021-06-17 ENCOUNTER — OFFICE VISIT (OUTPATIENT)
Dept: FAMILY MEDICINE CLINIC | Facility: CLINIC | Age: 71
End: 2021-06-17

## 2021-06-17 VITALS
HEIGHT: 64 IN | OXYGEN SATURATION: 98 % | SYSTOLIC BLOOD PRESSURE: 122 MMHG | WEIGHT: 144 LBS | HEART RATE: 64 BPM | BODY MASS INDEX: 24.59 KG/M2 | TEMPERATURE: 97.8 F | DIASTOLIC BLOOD PRESSURE: 64 MMHG

## 2021-06-17 DIAGNOSIS — E55.9 HYPOVITAMINOSIS D: ICD-10-CM

## 2021-06-17 DIAGNOSIS — I95.1 ORTHOSTATIC HYPOTENSION: ICD-10-CM

## 2021-06-17 DIAGNOSIS — E78.49 OTHER HYPERLIPIDEMIA: ICD-10-CM

## 2021-06-17 DIAGNOSIS — I10 ESSENTIAL HYPERTENSION: Primary | ICD-10-CM

## 2021-06-17 DIAGNOSIS — R73.01 IMPAIRED FASTING GLUCOSE: ICD-10-CM

## 2021-06-17 DIAGNOSIS — R53.82 CHRONIC FATIGUE: ICD-10-CM

## 2021-06-17 PROCEDURE — 99214 OFFICE O/P EST MOD 30 MIN: CPT | Performed by: FAMILY MEDICINE

## 2021-06-17 RX ORDER — POTASSIUM CHLORIDE 20 MEQ/1
20 TABLET, EXTENDED RELEASE ORAL DAILY
Qty: 90 TABLET | Refills: 1 | Status: SHIPPED | OUTPATIENT
Start: 2021-06-17 | End: 2021-07-20

## 2021-06-17 RX ORDER — CHLORTHALIDONE 25 MG/1
25 TABLET ORAL DAILY
Qty: 90 TABLET | Refills: 1 | Status: SHIPPED | OUTPATIENT
Start: 2021-06-17 | End: 2021-07-20

## 2021-06-17 NOTE — PROGRESS NOTES
"Chief Complaint  Hypertension (follow up pt has some episodes lightheadness and still has sometimes water retentions) and GI Problem (after taking garilck has terrible gas and cramping   and bloating )    Subjective          Snow Anderson presents to [facility]  History of Present Illness  Pleasant 71-year-old female here for hypertension, controlled on chlorthalidone.     Fatigue that is constant, has a couple hours of energy and then gets fatigued. She has stress with her 's fight with bladder cancer. She does know she has some depression but feels like it is under control.  She is able to do all that she is doing, no isolation. Has help with quilting.     Feels more pain in all her joints, getting relief with topical saves.     Getting occasional fevers. Episodic and last for 2 min.  Someitmes wakes her from sleep.  Unusual and that her temperature will then go back to normal in a couple of minutes sometimes she will be walking around her home or sleeping.  She has no other symptoms, no shortness of breath, no cough, no dysuria, no change to bowels.  This is been going on for months.  Not exactly sure the cause or particular pattern.  Can be rare.    Patient with some dizziness.  Objective   Vital Signs:   /64 (Patient Position: Sitting)   Pulse 64   Temp 97.8 °F (36.6 °C)   Ht 162.6 cm (64\")   Wt 65.3 kg (144 lb)   SpO2 98%   BMI 24.72 kg/m²       Vitals:    06/17/21 1242 06/17/21 1248 06/17/21 1249   Orthostatic BP: 122/64 112/62 134/76   Patient Position: Sitting Standing Lying         Physical Exam  Vitals and nursing note reviewed.   Constitutional:       General: She is not in acute distress.     Appearance: She is well-developed.   HENT:      Head: Normocephalic.      Nose: Nose normal.   Neck:      Comments: No cervical, supraclavicular, or axillary lymphadenopathy.  Patient denies any femoral  Cardiovascular:      Rate and Rhythm: Normal rate and " regular rhythm.      Heart sounds: Normal heart sounds. No murmur heard.     Pulmonary:      Effort: Pulmonary effort is normal. No respiratory distress.      Breath sounds: Normal breath sounds.   Musculoskeletal:         General: Normal range of motion.   Lymphadenopathy:      Cervical: No cervical adenopathy.   Skin:     General: Skin is warm and dry.      Findings: No rash.   Neurological:      Mental Status: She is alert and oriented to person, place, and time.   Psychiatric:         Behavior: Behavior normal.         Thought Content: Thought content normal.         Judgment: Judgment normal.        Result Review :                 Assessment and Plan    Diagnoses and all orders for this visit:    1. Essential hypertension (Primary)  -     Comprehensive Metabolic Panel  -     CBC & Differential  -     Vitamin D 25 Hydroxy  -     TSH Rfx On Abnormal To Free T4  -     potassium chloride (K-DUR,KLOR-CON) 20 MEQ CR tablet; Take 1 tablet by mouth Daily.  Dispense: 90 tablet; Refill: 1  -     chlorthalidone (HYGROTON) 25 MG tablet; Take 1 tablet by mouth Daily.  Dispense: 90 tablet; Refill: 1    2. Impaired fasting glucose  -     Hemoglobin A1c    3. Other hyperlipidemia    4. Chronic fatigue  -     CBC & Differential  -     Vitamin D 25 Hydroxy  -     TSH Rfx On Abnormal To Free T4  -     Hemoglobin A1c    5. Hypovitaminosis D  -     Vitamin D 25 Hydroxy    6. Orthostatic hypotension    Pleasant 71-year-old female here for follow-up for hypertension.  Overall vitals are well controlled today.  She does affirm drinking less fluids recently.  I think this is probably the cause of her orthostatic hypotension.  If it seems that this is persisting we may need to change her blood pressure medication from a diuretic to a different form of blood pressure control.  She seems to be tolerating it well currently will wait to make any changes.    Patient with hyperglycemia, hyperlipidemia that have been stable.    Patient with  chronic fatigue that I think is related to depression.  Her  has unfortunately been treated for bladder cancer and is undergoing chemotherapy.  This is causing substantial stress.  She does feel like she has good support, not isolating herself and able to accomplish all the tasks that are necessary.  Does not want to start depression medications at this time.  If things change I would be glad to consider various pharmacologic options.    Patient also has a very unusual symptom of fevers that occur periodically.  They will last for couple minutes and then go away.  It sounds similar to a hot flash but without the sweating.  She has had a temperature up to 101 degrees.  Usually at night waking her from sleep.  I do think it could be related to using warm recovers in the summertime.  Recommended increasing water, watching to see if she gets any corresponding symptoms or if she seems to be worsening to let me know.  Exam for lymphadenopathy today was negative.  We will follow labs as above.  Follow-up if not improving or worsening.      Follow Up   Return in about 6 months (around 12/17/2021), or if symptoms worsen or fail to improve, for Annual Exam with fasting labs prior.  Patient was given instructions and counseling regarding her condition or for health maintenance advice. Please see specific information pulled into the AVS if appropriate. Medical assistant and I wore mask and eyewear protection during entire encounter.  Patient wore mask.    Lilo Koehler MD

## 2021-06-18 LAB
25(OH)D3+25(OH)D2 SERPL-MCNC: 59.9 NG/ML (ref 30–100)
ALBUMIN SERPL-MCNC: 4.5 G/DL (ref 3.5–5.2)
ALBUMIN/GLOB SERPL: 2 G/DL
ALP SERPL-CCNC: 134 U/L (ref 39–117)
ALT SERPL-CCNC: 22 U/L (ref 1–33)
AST SERPL-CCNC: 23 U/L (ref 1–32)
BASOPHILS # BLD AUTO: 0.05 10*3/MM3 (ref 0–0.2)
BASOPHILS NFR BLD AUTO: 0.8 % (ref 0–1.5)
BILIRUB SERPL-MCNC: 0.4 MG/DL (ref 0–1.2)
BUN SERPL-MCNC: 26 MG/DL (ref 8–23)
BUN/CREAT SERPL: 26.5 (ref 7–25)
CALCIUM SERPL-MCNC: 9.8 MG/DL (ref 8.6–10.5)
CHLORIDE SERPL-SCNC: 101 MMOL/L (ref 98–107)
CO2 SERPL-SCNC: 33.1 MMOL/L (ref 22–29)
CREAT SERPL-MCNC: 0.98 MG/DL (ref 0.57–1)
EOSINOPHIL # BLD AUTO: 0.17 10*3/MM3 (ref 0–0.4)
EOSINOPHIL NFR BLD AUTO: 2.7 % (ref 0.3–6.2)
ERYTHROCYTE [DISTWIDTH] IN BLOOD BY AUTOMATED COUNT: 12.4 % (ref 12.3–15.4)
GLOBULIN SER CALC-MCNC: 2.3 GM/DL
GLUCOSE SERPL-MCNC: 121 MG/DL (ref 65–99)
HBA1C MFR BLD: 5.4 % (ref 4.8–5.6)
HCT VFR BLD AUTO: 40.8 % (ref 34–46.6)
HGB BLD-MCNC: 13.1 G/DL (ref 12–15.9)
IMM GRANULOCYTES # BLD AUTO: 0.01 10*3/MM3 (ref 0–0.05)
IMM GRANULOCYTES NFR BLD AUTO: 0.2 % (ref 0–0.5)
LYMPHOCYTES # BLD AUTO: 1.49 10*3/MM3 (ref 0.7–3.1)
LYMPHOCYTES NFR BLD AUTO: 23.4 % (ref 19.6–45.3)
MCH RBC QN AUTO: 30.7 PG (ref 26.6–33)
MCHC RBC AUTO-ENTMCNC: 32.1 G/DL (ref 31.5–35.7)
MCV RBC AUTO: 95.6 FL (ref 79–97)
MONOCYTES # BLD AUTO: 0.53 10*3/MM3 (ref 0.1–0.9)
MONOCYTES NFR BLD AUTO: 8.3 % (ref 5–12)
NEUTROPHILS # BLD AUTO: 4.12 10*3/MM3 (ref 1.7–7)
NEUTROPHILS NFR BLD AUTO: 64.6 % (ref 42.7–76)
NRBC BLD AUTO-RTO: 0 /100 WBC (ref 0–0.2)
PLATELET # BLD AUTO: 245 10*3/MM3 (ref 140–450)
POTASSIUM SERPL-SCNC: 4.1 MMOL/L (ref 3.5–5.2)
PROT SERPL-MCNC: 6.8 G/DL (ref 6–8.5)
RBC # BLD AUTO: 4.27 10*6/MM3 (ref 3.77–5.28)
SODIUM SERPL-SCNC: 144 MMOL/L (ref 136–145)
TSH SERPL DL<=0.005 MIU/L-ACNC: 2.68 UIU/ML (ref 0.27–4.2)
WBC # BLD AUTO: 6.37 10*3/MM3 (ref 3.4–10.8)

## 2021-06-29 NOTE — PROGRESS NOTES
Please call patient back with results.  The labs has resulted as stable kidney function, stable alkaline phosphatase compared to 6 months ago but still slightly elevated, blood counts thyroid function and prediabetes labs look great. I would recommend avoiding NSAIDs, drinking a good amount of water and following up as scheduled in about 6 months. If you notice abdominal pain or other problems please see you sooner. Please let us know if you have further questions.     Thank you

## 2021-07-12 RX ORDER — ATORVASTATIN CALCIUM 10 MG/1
TABLET, FILM COATED ORAL
Qty: 90 TABLET | Refills: 1 | Status: SHIPPED | OUTPATIENT
Start: 2021-07-12 | End: 2022-01-17

## 2021-07-20 DIAGNOSIS — I10 ESSENTIAL HYPERTENSION: ICD-10-CM

## 2021-07-20 RX ORDER — CHLORTHALIDONE 25 MG/1
TABLET ORAL
Qty: 90 TABLET | Refills: 1 | Status: SHIPPED | OUTPATIENT
Start: 2021-07-20 | End: 2022-02-18

## 2021-07-20 RX ORDER — POTASSIUM CHLORIDE 20 MEQ/1
TABLET, EXTENDED RELEASE ORAL
Qty: 90 TABLET | Refills: 1 | Status: SHIPPED | OUTPATIENT
Start: 2021-07-20 | End: 2022-02-18

## 2021-08-20 ENCOUNTER — OFFICE VISIT (OUTPATIENT)
Dept: FAMILY MEDICINE CLINIC | Facility: CLINIC | Age: 71
End: 2021-08-20

## 2021-08-20 ENCOUNTER — TELEPHONE (OUTPATIENT)
Dept: FAMILY MEDICINE CLINIC | Facility: CLINIC | Age: 71
End: 2021-08-20

## 2021-08-20 VITALS
DIASTOLIC BLOOD PRESSURE: 86 MMHG | RESPIRATION RATE: 16 BRPM | WEIGHT: 147.2 LBS | HEART RATE: 68 BPM | HEIGHT: 64 IN | SYSTOLIC BLOOD PRESSURE: 138 MMHG | BODY MASS INDEX: 25.13 KG/M2 | OXYGEN SATURATION: 96 % | TEMPERATURE: 98.2 F

## 2021-08-20 DIAGNOSIS — I10 ESSENTIAL HYPERTENSION: ICD-10-CM

## 2021-08-20 DIAGNOSIS — F41.9 ANXIETY: Primary | ICD-10-CM

## 2021-08-20 PROCEDURE — 99214 OFFICE O/P EST MOD 30 MIN: CPT

## 2021-08-20 RX ORDER — FLUOXETINE 10 MG/1
20 CAPSULE ORAL DAILY
Qty: 14 CAPSULE | Refills: 0 | Status: SHIPPED | OUTPATIENT
Start: 2021-08-20 | End: 2021-08-20

## 2021-08-20 RX ORDER — FLUOXETINE 10 MG/1
20 CAPSULE ORAL DAILY
Qty: 60 CAPSULE | Refills: 1 | Status: SHIPPED | OUTPATIENT
Start: 2021-08-20 | End: 2021-08-31

## 2021-08-20 RX ORDER — NICOTINE POLACRILEX 2 MG
GUM BUCCAL DAILY
COMMUNITY

## 2021-08-20 NOTE — ASSESSMENT & PLAN NOTE
Patient very tearful and anxious at visit today.  I believe it would be helpful to start patient on low-dose Prozac since she has been struggling with the stress and anxiety over the past month and is increasingly getting worse.  Will start Prozac 20 mg.  Educated patient to start with 10 mg for the first week and increase to the full 20 if tolerating medication well.  If not tolerating call office.  Or if symptoms get worse call.  Will recheck in 1 month.

## 2021-08-20 NOTE — PROGRESS NOTES
"Chief Complaint  Hypertension (Blood pressure being elevated due to stress and worried due to  having surgrey.) and Insomnia (unable to sleep, discuss depression medications also poss.)    Subjective          Snow Anderson presents to [facility]  History of Present Illness    Patient presents to the clinic with concerns of blood pressure control.  Patient states she has been recently very anxious due to her  having surgery next week for bladder cancer.  Patient stated over the past month she has gotten increasingly more anxious and stressed about the situation.  She said she feels more anxious than depressed in general.  Unable to sleep at night because her thoughts are always racing with the future surgery and recovery afterward.  She stated that her blood pressure is 140/80 at home and thinks it is usually well controlled but recently is a bit elevated due to her stress.  Also having some stomach uneasiness related to the stress as well.  No complaints of chest pain.  A little short of breath sometimes when stress is increased.  No swelling in lower extremities.  Patient states she does have a good support system with daughters, friends, and [employer] family as well.  She does states she feels like she has to be the one who stays in control of everything and she just cannot handle everything right now.  She stated that in the past she has felt this way before when she was overwhelmed with work and was started on a low-dose of Prozac for 6 months which helped her through that stressful period as well.  Today she has no suicidal homicidal thoughts.  Patient okay with discussing potentially restarting Prozac to help through this just full-time.        Objective   Vital Signs:   /86   Pulse 68   Temp 98.2 °F (36.8 °C)   Resp 16   Ht 162.6 cm (64\")   Wt 66.8 kg (147 lb 3.2 oz)   SpO2 96%   BMI 25.27 kg/m²     Physical Exam  Vitals reviewed.   HENT:      Head: " Normocephalic.      Mouth/Throat:      Mouth: Mucous membranes are moist.   Cardiovascular:      Rate and Rhythm: Normal rate and regular rhythm.      Heart sounds: Normal heart sounds.   Pulmonary:      Effort: Pulmonary effort is normal.      Breath sounds: Normal breath sounds.   Skin:     General: Skin is warm and dry.   Neurological:      General: No focal deficit present.      Mental Status: She is alert.   Psychiatric:         Mood and Affect: Mood is anxious. Affect is tearful.        Result Review :   The following data was reviewed by: JANESSA Delgado on 08/20/2021:  CMP    CMP 12/16/20 6/17/21   Glucose 92 121 (A)   BUN 15 26 (A)   Creatinine 0.98 0.98   eGFR Non African Am 56 (A) 56 (A)   eGFR  Am 68 68   Sodium 140 144   Potassium 4.0 4.1   Chloride 97 (A) 101   Calcium 9.9 9.8   Total Protein 7.0 6.8   Albumin 4.50 4.50   Globulin 2.5 2.3   Total Bilirubin 0.6 0.4   Alkaline Phosphatase 122 (A) 134 (A)   AST (SGOT) 28 23   ALT (SGPT) 25 22   (A) Abnormal value       Comments are available for some flowsheets but are not being displayed.           CBC w/diff    CBC w/Diff 12/16/20 6/17/21   WBC 5.22 6.37   RBC 4.40 4.27   Hemoglobin 13.1 13.1   Hematocrit 40.2 40.8   MCV 91.4 95.6   MCH 29.8 30.7   MCHC 32.6 32.1   RDW 11.9 (A) 12.4   Platelets 263 245   Neutrophil Rel % 64.5 64.6   Lymphocyte Rel % 23.8 23.4   Monocyte Rel % 8.8 8.3   Eosinophil Rel % 1.9 2.7   Basophil Rel % 0.8 0.8   (A) Abnormal value            Lipid Panel    Lipid Panel 12/16/20   Total Cholesterol 152   Triglycerides 42   HDL Cholesterol 93 (A)   VLDL Cholesterol 10   LDL Cholesterol  49   (A) Abnormal value       Comments are available for some flowsheets but are not being displayed.           TSH    TSH 12/16/20 6/17/21   TSH 2.880 2.680                     Assessment and Plan    Diagnoses and all orders for this visit:    1. Anxiety (Primary)  Assessment & Plan:  Patient very tearful and anxious at visit today.   I believe it would be helpful to start patient on low-dose Prozac since she has been struggling with the stress and anxiety over the past month and is increasingly getting worse.  Will start Prozac 20 mg.  Educated patient to start with 10 mg for the first week and increase to the full 20 if tolerating medication well.  If not tolerating call office.  Or if symptoms get worse call.  Will recheck in 1 month.    Orders:  -     Discontinue: FLUoxetine (PROzac) 10 MG capsule; Take 2 capsules by mouth Daily. Take 10 mg (1 capsule ) for 1 week then increase to 20 mg (2 capsule) if tolerating well  Dispense: 14 capsule; Refill: 0  -     FLUoxetine (PROzac) 10 MG capsule; Take 2 capsules by mouth Daily for 30 days. Take 10 mg (1 capsule) for first week then increase to 20 mg (2 capsule)  Dispense: 60 capsule; Refill: 1    2. Essential hypertension  Assessment & Plan:  Stable.  I believe that patient's recent elevation of blood pressure at home is due to stress.  Only slightly elevated at home at 140/80 with no chest pain or lower extremity edema.  Patient stated she has increased her fluid intake and is working on exercise and diet as well.  Continue current regimen and will recheck in 1 month.        Follow Up   Return in about 4 weeks (around 9/17/2021), or anxiety, for Recheck.  Patient was given instructions and counseling regarding her condition or for health maintenance advice. Please see specific information pulled into the AVS if appropriate.     Medical assistant and I wore mask and eyewear protection during entire encounter.  Patient wore mask.      Electronically signed by JANESSA Delgado, 08/20/21, 11:19 AM EDT.

## 2021-08-20 NOTE — ASSESSMENT & PLAN NOTE
Stable.  I believe that patient's recent elevation of blood pressure at home is due to stress.  Only slightly elevated at home at 140/80 with no chest pain or lower extremity edema.  Patient stated she has increased her fluid intake and is working on exercise and diet as well.  Continue current regimen and will recheck in 1 month.

## 2021-08-30 DIAGNOSIS — F41.9 ANXIETY: ICD-10-CM

## 2021-08-31 RX ORDER — FLUOXETINE 10 MG/1
20 CAPSULE ORAL DAILY
Qty: 30 CAPSULE | Refills: 1 | Status: SHIPPED | OUTPATIENT
Start: 2021-08-31 | End: 2021-12-22

## 2021-09-17 ENCOUNTER — OFFICE VISIT (OUTPATIENT)
Dept: FAMILY MEDICINE CLINIC | Facility: CLINIC | Age: 71
End: 2021-09-17

## 2021-09-17 VITALS
TEMPERATURE: 97.3 F | HEART RATE: 58 BPM | SYSTOLIC BLOOD PRESSURE: 120 MMHG | DIASTOLIC BLOOD PRESSURE: 76 MMHG | RESPIRATION RATE: 16 BRPM | HEIGHT: 64 IN | BODY MASS INDEX: 24.45 KG/M2 | WEIGHT: 143.2 LBS | OXYGEN SATURATION: 98 %

## 2021-09-17 DIAGNOSIS — F41.9 ANXIETY: Primary | ICD-10-CM

## 2021-09-17 DIAGNOSIS — M25.561 ACUTE PAIN OF RIGHT KNEE: ICD-10-CM

## 2021-09-17 PROCEDURE — 99213 OFFICE O/P EST LOW 20 MIN: CPT

## 2021-09-17 NOTE — ASSESSMENT & PLAN NOTE
Anxiety seems to be well controlled at this time on Prozac 10 mg. Counseled patient to continue current regimen. Come back to clinic if symptoms do get worse.  
No complaints

## 2021-09-17 NOTE — PROGRESS NOTES
"Chief Complaint  Anxiety (4 week f/u)    Subjective          Snow Anderson presents to [facility]  History of Present Illness     Patient presents to the clinic for 4-week follow-up for anxiety. Last visit with the patient was on 8/20/2021 by myself to discuss anxiety. At that time patient was very stressed due to 's health and surgery that was coming up and was very tearful. And started her on Prozac 20 mg and instructed her to start out taking 10 mg for the first week and then increase to the 20 mg. Patient stated that since taking the Prozac she has felt so much better and her stress is a lot lower now. Anxiety feels much more under control. Patient is able to sleep well at night. Patient stated she did go up to the 20 mg for about a week and a half but just felt very groggy and did not like the way it made her feel so she went back down to the 10 mg. On the 10 mg she stated that controls her anxiety well and does not feel the need to go up in the dose. Also since starting the Prozac her blood pressure is back under control. Patient stated she still has a lot going on with her  being in and out of the hospital since his surgery but now the stress is much more manageable. No suicidal thoughts.    Arthritis-patient stated that she has had more discomfort with her joints especially in her right knee. Patient stated she has arthritis and takes Tylenol arthritis which greatly helps the pain. Patient stated this is just recently gotten worse but just wants to try treating with over-the-counter medication and will follow up if the pain does not get better.      Objective   Vital Signs:   /76   Pulse 58   Temp 97.3 °F (36.3 °C)   Resp 16   Ht 162.6 cm (64\")   Wt 65 kg (143 lb 3.2 oz)   SpO2 98%   BMI 24.58 kg/m²     Physical Exam  Vitals reviewed.   Constitutional:       General: She is not in acute distress.  HENT:      Head: Normocephalic.   Cardiovascular: "      Rate and Rhythm: Normal rate and regular rhythm.      Heart sounds: Normal heart sounds.   Pulmonary:      Effort: Pulmonary effort is normal.      Breath sounds: Normal breath sounds.   Skin:     General: Skin is warm and dry.   Neurological:      Mental Status: She is alert.   Psychiatric:         Mood and Affect: Mood normal.        Result Review :                 Assessment and Plan    Diagnoses and all orders for this visit:    1. Anxiety (Primary)  Assessment & Plan:  Anxiety seems to be well controlled at this time on Prozac 10 mg. Counseled patient to continue current regimen. Come back to clinic if symptoms do get worse.      2. Acute pain of right knee    Pleasant 71-year-old female who presents to the clinic for follow-up on anxiety. Patient's anxiety is well controlled on Prozac 10 mg. Counseled patient to continue current regimen and if symptoms do come back and anxiety gets worse to come back to clinic. Otherwise condition follow-up with Dr. Koehler on 12/23/2021. Her right knee pain, patient believes it is arthritic in nature and it is controlled with over-the-counter medication, Tylenol arthritis. Counseled patient she can also try heat and ice therapy. Make sure she is wearing good shoes. If this pain does not get better come back to the office and we can do some further evaluation.        Follow Up   Return if symptoms worsen or fail to improve.  Patient was given instructions and counseling regarding her condition or for health maintenance advice. Please see specific information pulled into the AVS if appropriate.     Medical assistant and I wore mask and eyewear protection during entire encounter.  Patient wore mask.      Electronically signed by JANESSA Delgado, 09/17/21, 9:58 AM EDT.

## 2021-12-22 ENCOUNTER — OFFICE VISIT (OUTPATIENT)
Dept: FAMILY MEDICINE CLINIC | Facility: CLINIC | Age: 71
End: 2021-12-22

## 2021-12-22 VITALS
HEIGHT: 64 IN | TEMPERATURE: 96.9 F | SYSTOLIC BLOOD PRESSURE: 134 MMHG | HEART RATE: 64 BPM | DIASTOLIC BLOOD PRESSURE: 80 MMHG | RESPIRATION RATE: 16 BRPM | OXYGEN SATURATION: 99 % | WEIGHT: 144 LBS | BODY MASS INDEX: 24.59 KG/M2

## 2021-12-22 DIAGNOSIS — E78.49 OTHER HYPERLIPIDEMIA: ICD-10-CM

## 2021-12-22 DIAGNOSIS — I10 PRIMARY HYPERTENSION: ICD-10-CM

## 2021-12-22 DIAGNOSIS — Z00.00 MEDICARE ANNUAL WELLNESS VISIT, SUBSEQUENT: Primary | ICD-10-CM

## 2021-12-22 DIAGNOSIS — E55.9 HYPOVITAMINOSIS D: ICD-10-CM

## 2021-12-22 DIAGNOSIS — T78.40XD ALLERGY, SUBSEQUENT ENCOUNTER: ICD-10-CM

## 2021-12-22 DIAGNOSIS — F41.9 ANXIETY: ICD-10-CM

## 2021-12-22 PROBLEM — IMO0002 NUCLEAR CATARACT: Status: ACTIVE | Noted: 2021-11-01

## 2021-12-22 PROBLEM — T78.40XA ALLERGIES: Status: ACTIVE | Noted: 2021-12-22

## 2021-12-22 PROCEDURE — G0439 PPPS, SUBSEQ VISIT: HCPCS

## 2021-12-22 PROCEDURE — 1126F AMNT PAIN NOTED NONE PRSNT: CPT

## 2021-12-22 PROCEDURE — 99214 OFFICE O/P EST MOD 30 MIN: CPT

## 2021-12-22 PROCEDURE — 1170F FXNL STATUS ASSESSED: CPT

## 2021-12-22 PROCEDURE — 1159F MED LIST DOCD IN RCRD: CPT

## 2021-12-22 RX ORDER — PREDNISOLONE SODIUM PHOSPHATE 10 MG/ML
1 SOLUTION/ DROPS OPHTHALMIC DAILY
COMMUNITY
Start: 2021-12-15 | End: 2022-03-28

## 2021-12-22 NOTE — ASSESSMENT & PLAN NOTE
Patient's DEXA and mammogram were both complete in February of this year.  Bone density was normal and mammogram negative for malignancy.  Last colonoscopy was in 2015 and not due again until 2025.  Immunizations are up-to-date aside from shingles vaccine.  Patient counseled to go to pharmacy to receive this.  Advanced care plan is in the chart and still accurate.  Counseled patient to eat a diet high in vegetable, lean meat, whole grains and exercise 150 minutes a week.

## 2021-12-22 NOTE — ASSESSMENT & PLAN NOTE
Patient had a recent flare with her allergies turning into an upper respiratory infection.  Has already been treated by allergist with inhaler, prednisone, and Z-Jatin.  Patient symptoms are resolving and she has no infectious symptoms of fever, chills, or muscle aches.  Counseled her to continue with her allergy medication regimen and if the sinus congestion starts to get worse or she starts to experience signs of an infection to return to the clinic.

## 2021-12-22 NOTE — PROGRESS NOTES
The ABCs of the Annual Wellness Visit  Subsequent Medicare Wellness Visit    Chief Complaint   Patient presents with   • Medicare Wellness-subsequent     No complaints, pt is fasting   • Allergies     Discuss       Subjective    History of Present Illness:  Snow Anderson is a 71 y.o. female who presents for a Subsequent Medicare Wellness Visit.    Patient also wanting to discuss her allergy issues.  Patient does regularly see an allergist and gets allergy shots.  Patient says about once or twice a year, when the weather changes, she gets upper respiratory infections.  At the end of November she started to get one that she felt like was starting to go to her chest.  Patient was able to see her allergist and they did a chest x-ray which was clear.  I first patient was started on an inhaler and then a burst of oral steroids.  This temporarily helped her symptoms but then they came back.  Patient was then started on a Z-Jatin which she finished on Saturday.  Patient said since then her cough and chest congestion have all resolved.  Patient does have some residual sinus congestion but that is still starting to get better.  Denies any fever, chills, muscle aches.  Patient was also tested for Covid which was negative.  Patient is wondering if she should just continue to monitor for now or if she would need further medications.    Follow-up hypertension-well-controlled on chlorthalidone 25 mg.  Patient is also on potassium 20 mEq.  No chest pain, headache, dizziness or shortness of breath.  No lower extremity edema.  Rechecking CMP today.    Hyperlipidemia-patient is on atorvastatin 10 mg and tolerating this well with no side effects.  Needing lipids to be rechecked today.    Anxiety-patient was last seen by myself on 9/17/2021 to follow-up on anxiety.  At that time she was on Prozac and doing well.  Patient said that she stopped the Prozac at the end of November since she was doing so much better with her anxiety and just  temporarily needed it during a stressful time with her  having a lot of medical issues.  Patient says she is doing well now with anxiety and is controlled with no medications.      The following portions of the patient's history were reviewed and   updated as appropriate: allergies, current medications, past family history, past medical history, past social history, past surgical history and problem list.    Compared to one year ago, the patient feels her physical   health is the same.    Compared to one year ago, the patient feels her mental   health is the same.    Recent Hospitalizations:  She was not admitted to the hospital during the last year.       Current Medical Providers:  Patient Care Team:  Lilo Koehler MD as PCP - General (Family Medicine)    Outpatient Medications Prior to Visit   Medication Sig Dispense Refill   • albuterol sulfate  (90 Base) MCG/ACT inhaler      • aspirin 81 MG tablet Take  by mouth 1 (One) Time Per Week.     • atorvastatin (LIPITOR) 10 MG tablet Take 1 tablet by mouth once daily 90 tablet 1   • Azelastine HCl 137 MCG/SPRAY solution      • Biotin 1 MG capsule      • chlorthalidone (HYGROTON) 25 MG tablet Take 1 tablet by mouth once daily 90 tablet 1   • Cholecalciferol (VITAMIN D) 1000 UNITS tablet Take 2,000 Units by mouth.     • famotidine (PEPCID) 40 MG tablet Take 40 mg by mouth 2 (Two) Times a Day.     • fexofenadine (ALLEGRA ALLERGY) 180 MG tablet      • fluticasone-salmeterol (ADVAIR) 250-50 MCG/DOSE DISKUS Inhale 50 each 2 (two) times a day.     • hydrocortisone 2.5 % cream APPLY THIN LAYER TO AFFECTED AREA TWICE DAILY     • Multiple Vitamin (MULTI VITAMIN DAILY PO) Take  by mouth.     • potassium chloride (K-DUR,KLOR-CON) 20 MEQ CR tablet TAKE 1  BY MOUTH ONCE DAILY 90 tablet 1   • prednisoLONE sodium phosphate (INFLAMASE FORTE) 1 % ophthalmic solution Administer 1 drop to both eyes Daily.     • FLUoxetine (PROzac) 10 MG capsule Take 2 capsules by mouth  "Daily. 30 capsule 1     No facility-administered medications prior to visit.       No opioid medication identified on active medication list. I have reviewed chart for other potential  high risk medication/s and harmful drug interactions in the elderly.          Aspirin is on active medication list. Aspirin use is indicated based on review of current medical condition/s. Pros and cons of this therapy have been discussed today. Benefits of this medication outweigh potential harm.  Patient has been encouraged to continue taking this medication.  .      Patient Active Problem List   Diagnosis   • Atopic rhinitis   • Carotid atherosclerosis   • Hypertension   • Impaired fasting glucose   • Hypovitaminosis D   • Medicare annual wellness visit, subsequent   • Diverticulosis of large intestine without hemorrhage   • Multiple thyroid nodules   • Other hyperlipidemia   • Anxiety   • Nuclear cataract   • Allergies     Advance Care Planning  Advance Directive is on file.  ACP discussion was held with the patient during this visit. Patient has an advance directive in EMR which is still valid.     Review of Systems   HENT: Positive for congestion and postnasal drip.    All other systems reviewed and are negative.       Objective    Vitals:    12/22/21 0759   BP: 134/80   Pulse: 64   Resp: 16   Temp: 96.9 °F (36.1 °C)   SpO2: 99%   Weight: 65.3 kg (144 lb)   Height: 162.6 cm (64\")   PainSc: 0-No pain     BMI Readings from Last 1 Encounters:   12/22/21 24.72 kg/m²   BMI is within normal parameters. No follow-up required.    Does the patient have evidence of cognitive impairment? No    Physical Exam  Vitals reviewed.   Constitutional:       General: She is not in acute distress.  HENT:      Head: Normocephalic.      Nose: Congestion and rhinorrhea present.   Eyes:      Pupils: Pupils are equal, round, and reactive to light.   Cardiovascular:      Rate and Rhythm: Normal rate.      Pulses: Normal pulses.      Heart sounds: Normal " heart sounds.   Pulmonary:      Effort: Pulmonary effort is normal.      Breath sounds: Normal breath sounds.   Musculoskeletal:         General: Normal range of motion.   Skin:     General: Skin is warm and dry.   Neurological:      General: No focal deficit present.      Mental Status: She is alert.   Psychiatric:         Mood and Affect: Mood normal.                 HEALTH RISK ASSESSMENT    Smoking Status:  Social History     Tobacco Use   Smoking Status Never Smoker   Smokeless Tobacco Never Used     Alcohol Consumption:  Social History     Substance and Sexual Activity   Alcohol Use Yes   • Alcohol/week: 1.0 standard drink   • Types: 1 Glasses of wine per week    Comment: rare     Fall Risk Screen:    STEADI Fall Risk Assessment was completed, and patient is at LOW risk for falls.Assessment completed on:12/22/2021    Depression Screening:  PHQ-2/PHQ-9 Depression Screening 6/17/2021   Little interest or pleasure in doing things 0   Feeling down, depressed, or hopeless 0   Trouble falling or staying asleep, or sleeping too much -   Feeling tired or having little energy -   Poor appetite or overeating -   Feeling bad about yourself - or that you are a failure or have let yourself or your family down -   Trouble concentrating on things, such as reading the newspaper or watching television -   Moving or speaking so slowly that other people could have noticed. Or the opposite - being so fidgety or restless that you have been moving around a lot more than usual -   Thoughts that you would be better off dead, or of hurting yourself in some way -   Total Score 0   If you checked off any problems, how difficult have these problems made it for you to do your work, take care of things at home, or get along with other people? -       Health Habits and Functional and Cognitive Screening:  Functional & Cognitive Status 12/22/2021   Do you have difficulty preparing food and eating? No   Do you have difficulty bathing  yourself, getting dressed or grooming yourself? No   Do you have difficulty using the toilet? No   Do you have difficulty moving around from place to place? No   Do you have trouble with steps or getting out of a bed or a chair? No   Current Diet Well Balanced Diet   Dental Exam Up to date   Eye Exam Up to date   Exercise (times per week) 3 times per week   Current Exercises Include Walking   Current Exercise Activities Include -   Do you need help using the phone?  No   Are you deaf or do you have serious difficulty hearing?  No   Do you need help with transportation? No   Do you need help shopping? No   Do you need help preparing meals?  No   Do you need help with housework?  No   Do you need help with laundry? No   Do you need help taking your medications? No   Do you need help managing money? No   Do you ever drive or ride in a car without wearing a seat belt? No   Have you felt unusual stress, anger or loneliness in the last month? No   Who do you live with? Spouse   If you need help, do you have trouble finding someone available to you? No   Have you been bothered in the last four weeks by sexual problems? No   Do you have difficulty concentrating, remembering or making decisions? No       Age-appropriate Screening Schedule:  Refer to the list below for future screening recommendations based on patient's age, sex and/or medical conditions. Orders for these recommended tests are listed in the plan section. The patient has been provided with a written plan.    Health Maintenance   Topic Date Due   • ZOSTER VACCINE (2 of 3) 07/26/2017   • LIPID PANEL  12/16/2021   • MAMMOGRAM  02/04/2023   • DXA SCAN  02/04/2023   • TDAP/TD VACCINES (3 - Td or Tdap) 04/25/2030   • INFLUENZA VACCINE  Completed              Assessment/Plan   CMS Preventative Services Quick Reference  Risk Factors Identified During Encounter  Cardiovascular Disease  Depression/Dysphoria  Immunizations Discussed/Encouraged (specific Immunizations;  Shingrix  The above risks/problems have been discussed with the patient.  Follow up actions/plans if indicated are seen below in the Assessment/Plan Section.  Pertinent information has been shared with the patient in the After Visit Summary.    Diagnoses and all orders for this visit:    1. Medicare annual wellness visit, subsequent (Primary)  Assessment & Plan:  Patient's DEXA and mammogram were both complete in February of this year.  Bone density was normal and mammogram negative for malignancy.  Last colonoscopy was in 2015 and not due again until 2025.  Immunizations are up-to-date aside from shingles vaccine.  Patient counseled to go to pharmacy to receive this.  Advanced care plan is in the chart and still accurate.  Counseled patient to eat a diet high in vegetable, lean meat, whole grains and exercise 150 minutes a week.    Orders:  -     CBC & Differential  -     Comprehensive Metabolic Panel    2. Allergy, subsequent encounter  Assessment & Plan:  Patient had a recent flare with her allergies turning into an upper respiratory infection.  Has already been treated by allergist with inhaler, prednisone, and Z-Jatin.  Patient symptoms are resolving and she has no infectious symptoms of fever, chills, or muscle aches.  Counseled her to continue with her allergy medication regimen and if the sinus congestion starts to get worse or she starts to experience signs of an infection to return to the clinic.    Orders:  -     CBC & Differential  -     Comprehensive Metabolic Panel  -     Vitamin D 25 Hydroxy    3. Primary hypertension  Assessment & Plan:  Controlled on chlorthalidone 25 mg.  Continue current regimen along with potassium 20 mEq.  Rechecking CMP today.    Orders:  -     CBC & Differential  -     Comprehensive Metabolic Panel  -     Vitamin D 25 Hydroxy    4. Other hyperlipidemia  Assessment & Plan:  Continue atorvastatin 10 mg.  Rechecking lipids today.    Orders:  -     Lipid Panel    5.  Anxiety  Assessment & Plan:  Well-controlled now without any medication.  Continue to monitor for now.      6. Hypovitaminosis D  -     Vitamin D 25 Hydroxy      Follow Up:   Return in about 6 months (around 6/22/2022) for Recheck HTN.     An After Visit Summary and PPPS were made available to the patient.               Medical assistant and I wore mask and eyewear protection during entire encounter.  Patient wore mask.      Electronically signed by JANESSA Delgado, 12/22/21, 8:42 AM EST.

## 2021-12-22 NOTE — ASSESSMENT & PLAN NOTE
Controlled on chlorthalidone 25 mg.  Continue current regimen along with potassium 20 mEq.  Rechecking CMP today.

## 2021-12-23 LAB
25(OH)D3+25(OH)D2 SERPL-MCNC: 51.8 NG/ML (ref 30–100)
ALBUMIN SERPL-MCNC: 4.2 G/DL (ref 3.7–4.7)
ALBUMIN/GLOB SERPL: 1.8 {RATIO} (ref 1.2–2.2)
ALP SERPL-CCNC: 127 IU/L (ref 44–121)
ALT SERPL-CCNC: 53 IU/L (ref 0–32)
AST SERPL-CCNC: 48 IU/L (ref 0–40)
BASOPHILS # BLD AUTO: 0.1 X10E3/UL (ref 0–0.2)
BASOPHILS NFR BLD AUTO: 1 %
BILIRUB SERPL-MCNC: 0.5 MG/DL (ref 0–1.2)
BUN SERPL-MCNC: 24 MG/DL (ref 8–27)
BUN/CREAT SERPL: 22 (ref 12–28)
CALCIUM SERPL-MCNC: 9.7 MG/DL (ref 8.7–10.3)
CHLORIDE SERPL-SCNC: 97 MMOL/L (ref 96–106)
CHOLEST SERPL-MCNC: 175 MG/DL (ref 100–199)
CO2 SERPL-SCNC: 30 MMOL/L (ref 20–29)
CREAT SERPL-MCNC: 1.08 MG/DL (ref 0.57–1)
EOSINOPHIL # BLD AUTO: 0.3 X10E3/UL (ref 0–0.4)
EOSINOPHIL NFR BLD AUTO: 4 %
ERYTHROCYTE [DISTWIDTH] IN BLOOD BY AUTOMATED COUNT: 12.3 % (ref 11.7–15.4)
GLOBULIN SER CALC-MCNC: 2.4 G/DL (ref 1.5–4.5)
GLUCOSE SERPL-MCNC: 92 MG/DL (ref 65–99)
HCT VFR BLD AUTO: 38.4 % (ref 34–46.6)
HDLC SERPL-MCNC: 100 MG/DL
HGB BLD-MCNC: 12.6 G/DL (ref 11.1–15.9)
IMM GRANULOCYTES # BLD AUTO: 0 X10E3/UL (ref 0–0.1)
IMM GRANULOCYTES NFR BLD AUTO: 0 %
LDLC SERPL CALC-MCNC: 66 MG/DL (ref 0–99)
LYMPHOCYTES # BLD AUTO: 1.5 X10E3/UL (ref 0.7–3.1)
LYMPHOCYTES NFR BLD AUTO: 23 %
MCH RBC QN AUTO: 30.1 PG (ref 26.6–33)
MCHC RBC AUTO-ENTMCNC: 32.8 G/DL (ref 31.5–35.7)
MCV RBC AUTO: 92 FL (ref 79–97)
MONOCYTES # BLD AUTO: 0.6 X10E3/UL (ref 0.1–0.9)
MONOCYTES NFR BLD AUTO: 9 %
NEUTROPHILS # BLD AUTO: 4.2 X10E3/UL (ref 1.4–7)
NEUTROPHILS NFR BLD AUTO: 63 %
PLATELET # BLD AUTO: 276 X10E3/UL (ref 150–450)
POTASSIUM SERPL-SCNC: 3.7 MMOL/L (ref 3.5–5.2)
PROT SERPL-MCNC: 6.6 G/DL (ref 6–8.5)
RBC # BLD AUTO: 4.18 X10E6/UL (ref 3.77–5.28)
SODIUM SERPL-SCNC: 141 MMOL/L (ref 134–144)
TRIGL SERPL-MCNC: 38 MG/DL (ref 0–149)
VLDLC SERPL CALC-MCNC: 9 MG/DL (ref 5–40)
WBC # BLD AUTO: 6.7 X10E3/UL (ref 3.4–10.8)

## 2022-01-04 ENCOUNTER — TELEPHONE (OUTPATIENT)
Dept: FAMILY MEDICINE CLINIC | Facility: CLINIC | Age: 72
End: 2022-01-04

## 2022-01-04 DIAGNOSIS — R94.5 ABNORMAL RESULTS OF LIVER FUNCTION STUDIES: ICD-10-CM

## 2022-01-04 DIAGNOSIS — R74.8 ELEVATED LIVER ENZYMES: Primary | ICD-10-CM

## 2022-01-04 DIAGNOSIS — R74.8 ELEVATED ALKALINE PHOSPHATASE LEVEL: ICD-10-CM

## 2022-01-04 NOTE — TELEPHONE ENCOUNTER
Since it has been almost 2 weeks since I last saw the patient she would need to have another visit before further treatment.  Patient also may need Covid testing since she is developing a cough as well.

## 2022-01-04 NOTE — TELEPHONE ENCOUNTER
Caller: Snow Anderson    Relationship: Self    Best call back number: [phone]     Requested Prescriptions:     A Z-SAMANTHA OR OTHER ANTIBIOTIC FOR SINUS PRESSURE, DRAINAGE GOING INTO CHEST CAUSING TIGHTNESS, AND COUGH.       Pharmacy where request should be sent: [address] [facility] [phone] [facility] [phone] [facility]     Additional details provided by patient:     PATIENT HAS A PENDING SURGICAL PROCEDURE COMING UP THAT SHE HAD DISCUSSED WITH [facility]OE[facility]AT AND SHE SAID SHE WOULD MAKE A NOTE ABOUT PATIENT NEEDING ADDITIONAL ANTIBIOTICS.  THE SURGICAL PROCEDURE IS CATARACT SURGERY AND SHE IS CONCERNED BECAUSE OF THE COUGH.      IF THERE IS ANY ADDITIONAL QUESTIONS OR CONCERNS PLEASE CALL PATIENT.    Does the patient have less than a 3 day supply:  [x] Yes  [] No    Maryana Hallman Rep   01/04/22 11:28 EST

## 2022-01-05 NOTE — PROGRESS NOTES
Chief Complaint  Cough (cough for long time now has congestion sinus pressure aND DRAINAGE FOR OVER 2 WEEKS  )    Masks/face shield/appropriate PPE were worn for the entirety of the visit by the patient, MA, and provider.     Subjective          Snow Anderson presents to [facility]  History of Present Illness  Snow Anderson is a 71 y.o. female who presents to the clinic today to discuss complaints of continued cough.     Patient was seen by her allergist on 11/30/2021 for complaints of cough and drainage. She states she was given an albuterol inhaler, z-pack, and prednisone. She states a chest x-ray was performed that was reportedly unremarkable. She states this helped break things up. The albuterol inhaler made her sick, so she only used it for 6 days. She was given prednisone and cough pills three times a day and took these for 2 weeks. In the middle of December, she went to [place] to visit her daughter and felt better. She was able to walk about 5 miles a day without issue. Later that month,she developed a runny nose.  The past few days, she has had a constant cough. She feels like the cough is at the top of her chest. She has been taking albuterol with severe symptoms. She reports a lot of nasal drainage.  She denies tickle in her throat, but has drainage. She feels she manage with the way her cough is. She states her cough is dry and not productive. Every year in the fall or early winter, she goes through these similar symptoms. She is unsure if steroids or antibiotics have helped most in the past when she experienced these symptoms. Staying hydrated helps her symptoms. When her throat is dry, her cough is worse. Her cough is deepest in the morning. It has been questioned in the past if her symptoms are caused by reflux. She is taking famotidine. She states the medication seems to be working. She sleeps on pillows most of the night inclined.She denies a recent history of  "pneumonia. She thinks they have called her symptoms asthma, but she denies asthma attack. She has had PFT's in the past that have reportedly been normal. She is on advair and albuterol as needed. She feels the advair has helped her symptoms.  She feels the advair helps the most. Azelastine has helped with her nasal congestion. She has been getting allergy shots every other week for 3 years. She can breath well today. She last took psudafed 3-4 days ago. She has tried Mucinex especially with having a deeper cough. She does saline nasal spray and humidifier.     Review of Systems   Constitutional: Negative.    HENT: Positive for postnasal drip and sinus pressure.    Eyes: Negative.    Respiratory: Positive for cough.    Gastrointestinal: Negative.    Endocrine: Negative.    Genitourinary: Negative.    Musculoskeletal: Negative.    Skin: Negative.    Allergic/Immunologic: Negative.    Neurological: Negative.    Hematological: Negative.    Psychiatric/Behavioral: Negative.      Objective   Vital Signs:   /70   Pulse 68   Temp 97.8 °F (36.6 °C)   Ht 162.6 cm (64\")   Wt 65.8 kg (145 lb)   SpO2 98%   BMI 24.89 kg/m²     Physical Exam  Vitals reviewed.   Constitutional:       General: She is not in acute distress.     Appearance: Normal appearance. She is not ill-appearing, toxic-appearing or diaphoretic.   HENT:      Head: Normocephalic and atraumatic.      Right Ear: A middle ear effusion (Serous fluid) is present.      Left Ear: A middle ear effusion (Serous fluid) is present.   Eyes:      General: No scleral icterus.        Right eye: No discharge.         Left eye: No discharge.      Extraocular Movements: Extraocular movements intact.   Pulmonary:      Effort: No respiratory distress.      Breath sounds: Normal breath sounds. No stridor. No wheezing or rhonchi.   Chest:      Chest wall: No tenderness.   Musculoskeletal:      Cervical back: Normal range of motion.   Lymphadenopathy:      Cervical: No " cervical adenopathy.   Neurological:      General: No focal deficit present.      Mental Status: She is alert and oriented to person, place, and time.      Motor: No weakness.      Gait: Gait normal.   Psychiatric:         Mood and Affect: Mood normal.         Behavior: Behavior normal.        Result Review :       Data reviewed: Radiologic studies chest x-ray   DATE: 12/1/2021 10:43 AM     COMPARISON: September 12, 2019     CLINICAL HISTORY: Cough.     FINDINGS: Two views of the chest are provided.  A normal cardiomediastinal silhouette is present. The lung fields are clear. There is no pulmonary edema or pleural effusion. No pneumothorax is identified.  The bony structures are unremarkable.     IMPRESSION:   1. No acute process in the chest.     Dictated by: Wagner Frazier M.D.     Images and Report reviewed and interpreted by: Wagner Frazier M.D.            Assessment and Plan    Diagnoses and all orders for this visit:    1. Cough (Primary)  -     omeprazole (priLOSEC) 20 MG capsule; Take 1 capsule by mouth Daily.  Dispense: 30 capsule; Refill: 1    2. Gastroesophageal reflux disease, unspecified whether esophagitis present  -     omeprazole (priLOSEC) 20 MG capsule; Take 1 capsule by mouth Daily.  Dispense: 30 capsule; Refill: 1    Other orders  -     Discontinue: predniSONE (DELTASONE) 20 MG tablet; Take 2 tablets by mouth Daily for 5 days.  Dispense: 10 tablet; Refill: 0      Patient presents today with a long-standing history of cough. She has been treated with prednisone and antibiotics in early December. She also had a chest x-ray performed by her allergist that was reportedly negative. Patient's lungs sounds are clear on auscultation. There are no signs of present infection. She does not appear to be in distress. Her cough could be due to continued rhinorrhea related to her chronic allergies. She was encouraged to continue to follow-up with her allergist and continue Advair and Allegra daily and albuterol  as needed. Will also need to evaluate for possible other etiology, like GERD based on chronicity of her symptoms. Patient's Pepcid was switched to omeprazole. She was advised to call our office if her symptoms are not improving or improved by next week. May also benefit from a course of steroids to help with inflammation related to allergies. Patient was advised to call our office if she develops productive cough or fever. Patient was advised to go to the ED for any shortness of breath. She was advised to continue to monitor blood pressure at home as it was elevated here in the office today.    Follow Up   Return in about 6 weeks (around 2/17/2022) for Recheck, cough, hypertension.  Patient was given instructions and counseling regarding her condition or for health maintenance advice. Please see specific information pulled into the AVS if appropriate.     Electronically signed by JANESSA Godwin, 01/12/22, 7:34 AM EST.

## 2022-01-06 ENCOUNTER — OFFICE VISIT (OUTPATIENT)
Dept: FAMILY MEDICINE CLINIC | Facility: CLINIC | Age: 72
End: 2022-01-06

## 2022-01-06 VITALS
HEIGHT: 64 IN | BODY MASS INDEX: 24.75 KG/M2 | HEART RATE: 68 BPM | OXYGEN SATURATION: 98 % | TEMPERATURE: 97.8 F | WEIGHT: 145 LBS | DIASTOLIC BLOOD PRESSURE: 70 MMHG | SYSTOLIC BLOOD PRESSURE: 142 MMHG

## 2022-01-06 DIAGNOSIS — R05.9 COUGH: Primary | ICD-10-CM

## 2022-01-06 DIAGNOSIS — K21.9 GASTROESOPHAGEAL REFLUX DISEASE, UNSPECIFIED WHETHER ESOPHAGITIS PRESENT: ICD-10-CM

## 2022-01-06 PROCEDURE — 99213 OFFICE O/P EST LOW 20 MIN: CPT | Performed by: NURSE PRACTITIONER

## 2022-01-06 RX ORDER — PREDNISONE 20 MG/1
40 TABLET ORAL DAILY
Qty: 10 TABLET | Refills: 0 | Status: SHIPPED | OUTPATIENT
Start: 2022-01-06 | End: 2022-01-12

## 2022-01-06 RX ORDER — OMEPRAZOLE 20 MG/1
20 CAPSULE, DELAYED RELEASE ORAL DAILY
Qty: 30 CAPSULE | Refills: 1 | Status: SHIPPED | OUTPATIENT
Start: 2022-01-06 | End: 2022-02-18

## 2022-01-17 RX ORDER — ATORVASTATIN CALCIUM 10 MG/1
TABLET, FILM COATED ORAL
Qty: 90 TABLET | Refills: 0 | Status: SHIPPED | OUTPATIENT
Start: 2022-01-17 | End: 2022-03-28

## 2022-02-07 ENCOUNTER — TELEPHONE (OUTPATIENT)
Dept: FAMILY MEDICINE CLINIC | Facility: CLINIC | Age: 72
End: 2022-02-07

## 2022-02-07 NOTE — TELEPHONE ENCOUNTER
There are different recommendations, I follow the USPSTF recommendations of screening every 2 years until 75 YO, and then we can decide if you want to continue screening after that.  Most women do but we can talk through that decision in person.

## 2022-02-07 NOTE — TELEPHONE ENCOUNTER
Caller: Snow Anderson    Relationship: Self    Best call back number: [phone]      What was the call regarding: PATIENT RECEIVED LETTER FROM [facility] THAT HER MAMMOGRAM IS DUE/LAST ONE WAS 02/04/2021. SHE WOULD LIKE TO KNOW WHAT DR. ALICIA'S RECOMMENDATIONS ARE. PLEASE ADVISE.    Do you require a callback: YES

## 2022-02-16 DIAGNOSIS — R74.8 ELEVATED LIVER ENZYMES: Primary | ICD-10-CM

## 2022-02-18 ENCOUNTER — TELEPHONE (OUTPATIENT)
Dept: FAMILY MEDICINE CLINIC | Facility: CLINIC | Age: 72
End: 2022-02-18

## 2022-02-18 DIAGNOSIS — K21.9 GASTROESOPHAGEAL REFLUX DISEASE, UNSPECIFIED WHETHER ESOPHAGITIS PRESENT: ICD-10-CM

## 2022-02-18 DIAGNOSIS — R05.9 COUGH: ICD-10-CM

## 2022-02-18 DIAGNOSIS — I10 ESSENTIAL HYPERTENSION: ICD-10-CM

## 2022-02-18 RX ORDER — CHLORTHALIDONE 25 MG/1
TABLET ORAL
Qty: 90 TABLET | Refills: 0 | Status: SHIPPED | OUTPATIENT
Start: 2022-02-18 | End: 2022-05-03

## 2022-02-18 RX ORDER — POTASSIUM CHLORIDE 20 MEQ/1
TABLET, EXTENDED RELEASE ORAL
Qty: 90 TABLET | Refills: 0 | Status: SHIPPED | OUTPATIENT
Start: 2022-02-18 | End: 2022-05-03

## 2022-02-18 RX ORDER — OMEPRAZOLE 20 MG/1
CAPSULE, DELAYED RELEASE ORAL
Qty: 30 CAPSULE | Refills: 0 | Status: SHIPPED | OUTPATIENT
Start: 2022-02-18 | End: 2022-02-21

## 2022-02-18 NOTE — TELEPHONE ENCOUNTER
Caller: Snow Anderson    Relationship: Self    Best call back number: [phone]    What medications are you currently taking:   Current Outpatient Medications on File Prior to Visit   Medication Sig Dispense Refill   • albuterol sulfate  (90 Base) MCG/ACT inhaler      • aspirin 81 MG tablet Take  by mouth 1 (One) Time Per Week.     • atorvastatin (LIPITOR) 10 MG tablet Take 1 tablet by mouth once daily 90 tablet 0   • Azelastine HCl 137 MCG/SPRAY solution      • Biotin 1 MG capsule      • chlorthalidone (HYGROTON) 25 MG tablet Take 1 tablet by mouth once daily 90 tablet 0   • Cholecalciferol (VITAMIN D) 1000 UNITS tablet Take 2,000 Units by mouth.     • fexofenadine (ALLEGRA ALLERGY) 180 MG tablet      • fluticasone-salmeterol (ADVAIR) 250-50 MCG/DOSE DISKUS Inhale 50 each 2 (two) times a day.     • hydrocortisone 2.5 % cream APPLY THIN LAYER TO AFFECTED AREA TWICE DAILY     • Multiple Vitamin (MULTI VITAMIN DAILY PO) Take  by mouth.     • omeprazole (priLOSEC) 20 MG capsule Take 1 capsule by mouth once daily 30 capsule 0   • potassium chloride (K-DUR,KLOR-CON) 20 MEQ CR tablet Take 1 tablet by mouth once daily 90 tablet 0   • prednisoLONE sodium phosphate (INFLAMASE FORTE) 1 % ophthalmic solution Administer 1 drop to both eyes Daily.     • [DISCONTINUED] chlorthalidone (HYGROTON) 25 MG tablet Take 1 tablet by mouth once daily 90 tablet 1   • [DISCONTINUED] omeprazole (priLOSEC) 20 MG capsule Take 1 capsule by mouth Daily. 30 capsule 1   • [DISCONTINUED] potassium chloride (K-DUR,KLOR-CON) 20 MEQ CR tablet TAKE 1  BY MOUTH ONCE DAILY 90 tablet 1     No current facility-administered medications on file prior to visit.          which medication are you concerned about:atorvastatin (LIPITOR) 10 MG tablet    What are your concerns: PROBLEMS WITH ITCHING, WHICH IS AFFECTING HER SLEEP.  PATIENT WANTS TO STOP TAKING THIS MEDICATION.      How long have you had these concerns: 2 WEEKS

## 2022-02-19 DIAGNOSIS — K21.9 GASTROESOPHAGEAL REFLUX DISEASE, UNSPECIFIED WHETHER ESOPHAGITIS PRESENT: ICD-10-CM

## 2022-02-19 DIAGNOSIS — R05.9 COUGH: ICD-10-CM

## 2022-02-21 RX ORDER — OMEPRAZOLE 20 MG/1
CAPSULE, DELAYED RELEASE ORAL
Qty: 30 CAPSULE | Refills: 0 | Status: SHIPPED | OUTPATIENT
Start: 2022-02-21 | End: 2022-04-25 | Stop reason: SDUPTHER

## 2022-02-22 ENCOUNTER — HOSPITAL ENCOUNTER (OUTPATIENT)
Dept: ULTRASOUND IMAGING | Facility: HOSPITAL | Age: 72
Discharge: HOME | End: 2022-02-22

## 2022-02-22 DIAGNOSIS — R74.8 ELEVATED LIVER ENZYMES: ICD-10-CM

## 2022-02-22 DIAGNOSIS — E78.5 HYPERLIPIDEMIA, UNSPECIFIED HYPERLIPIDEMIA TYPE: Primary | ICD-10-CM

## 2022-02-22 PROCEDURE — 76705 ECHO EXAM OF ABDOMEN: CPT

## 2022-02-22 NOTE — TELEPHONE ENCOUNTER
Pt will give  A try a few more day without the lipitor if she continues having issues with insomnia will call and make apt with dr nazario

## 2022-03-28 ENCOUNTER — OFFICE VISIT (OUTPATIENT)
Dept: FAMILY MEDICINE CLINIC | Facility: CLINIC | Age: 72
End: 2022-03-28

## 2022-03-28 VITALS
DIASTOLIC BLOOD PRESSURE: 80 MMHG | OXYGEN SATURATION: 97 % | WEIGHT: 142 LBS | SYSTOLIC BLOOD PRESSURE: 116 MMHG | BODY MASS INDEX: 24.24 KG/M2 | HEIGHT: 64 IN | HEART RATE: 64 BPM | RESPIRATION RATE: 16 BRPM | TEMPERATURE: 97.1 F

## 2022-03-28 DIAGNOSIS — R05.9 COUGH: ICD-10-CM

## 2022-03-28 DIAGNOSIS — J01.00 ACUTE NON-RECURRENT MAXILLARY SINUSITIS: Primary | ICD-10-CM

## 2022-03-28 PROCEDURE — 99213 OFFICE O/P EST LOW 20 MIN: CPT | Performed by: NURSE PRACTITIONER

## 2022-03-28 RX ORDER — METHYLPREDNISOLONE 4 MG/1
TABLET ORAL
Qty: 21 EACH | Refills: 0 | Status: SHIPPED | OUTPATIENT
Start: 2022-03-28 | End: 2022-04-25

## 2022-03-28 NOTE — PROGRESS NOTES
"Chief Complaint  Cough (Head pressure, dark yellow mucus, pain in teeth. Started last Thursday) and Sinus Problem    Masks/face shield/appropriate PPE were worn for the entirety of the visit by the patient, MA, and provider.     Subjective          Snow Anderson presents to [facility]  History of Present Illness  Snow Anderson is a 71 y.o. female who presents to the clinic today with complaints of nasal congestion for 4 days.  She complains of increased pressure in her head as well as pain in her teeth.  Her symptoms are mainly on the left side maxillary. She is having frontal head pressure bilaterally.  She has gotten some \"nasty yellow stuff \"out of her sinuses this weekend. She is not getting as much drainage out today because she states her nasal passages are tight.   She has tried using her nebulizer starting 2 days ago.  She has a slight dry cough and a tickle in her throat.  She has tried Sudafed that helped, but she does not take this consistently due to her history of hypertension.  She also tried DayQuil this morning that did not really help.  She tried a dose of Mucinex on Saturday, but it made her sleepy.  She has tried switching her Allegra to Zyrtec.  She remains consistent with taking azelastine nasal spray and Advair daily.  She denies shortness of breath.  She does follow with an allergist.  She used to receive allergy shots.  She also used to take Singulair, but states Singulair did not help.  She denies recent exposures to sick contacts, but has been out to eat a few times this weekend.      Review of Systems   Constitutional: Negative.    HENT: Positive for sinus pressure.         Tooth pain   Eyes: Negative.    Respiratory: Positive for cough.    Cardiovascular: Negative.    Gastrointestinal: Negative.    Endocrine: Negative.    Genitourinary: Negative.    Musculoskeletal: Negative.    Skin: Negative.    Allergic/Immunologic: Negative.    Neurological: " "Negative.    Hematological: Negative.    Psychiatric/Behavioral: Negative.        Objective   Vital Signs:   /80   Pulse 64   Temp 97.1 °F (36.2 °C)   Resp 16   Ht 162.6 cm (64\")   Wt 64.4 kg (142 lb)   SpO2 97%   BMI 24.37 kg/m²     BMI is within normal parameters. No follow-up required.      Physical Exam  Constitutional:       General: She is not in acute distress.     Appearance: Normal appearance. She is not ill-appearing, toxic-appearing or diaphoretic.   HENT:      Head: Normocephalic and atraumatic.      Comments: Tenderness with bilateral frontal sinus palpation and left sided maxillary sinus palpation     Right Ear: Tympanic membrane, ear canal and external ear normal.      Left Ear: Tympanic membrane, ear canal and external ear normal.      Nose: Congestion present.      Mouth/Throat:      Pharynx: Posterior oropharyngeal erythema present. No oropharyngeal exudate.   Eyes:      General: No scleral icterus.        Right eye: No discharge.         Left eye: No discharge.      Extraocular Movements: Extraocular movements intact.   Pulmonary:      Effort: Pulmonary effort is normal. No respiratory distress.      Breath sounds: Normal breath sounds. No stridor. No wheezing or rhonchi.   Neurological:      General: No focal deficit present.      Mental Status: She is alert and oriented to person, place, and time.      Motor: No weakness.      Gait: Gait normal.   Psychiatric:         Mood and Affect: Mood normal.         Behavior: Behavior normal.        Result Review :                 Assessment and Plan    Diagnoses and all orders for this visit:    1. Acute non-recurrent maxillary sinusitis (Primary)  -     methylPREDNISolone (MEDROL) 4 MG dose pack; Take as directed on package instructions.  Dispense: 21 each; Refill: 0    2. Cough  -     COVID-19,[facility] ROUTINE, NP/OP SWAB IN TRANSPORT MEDIA OR ESWAB 72 HR TAT - Swab, Nasopharynx       Patient presents today with complaints of sinus " congestion.  Patient's symptoms are likely triggered from her allergies.  I would advise against using Sudafed at this time because of her history of hypertension.  I encouraged patient to use a nasal steroid OTC like Flonase on Nasonex. She was encouraged to use guaifenesin. She should continue a daily allergy medication.  Due to her history of asthma and complaints of cough and sinusitis, she has been prescribed a Medrol Dosepak today.  She was advised to take this in the morning with food.  Patient will call us on Thursday if her symptoms have not improved as an antibiotic would be warranted.  I will consider doxycycline as she is allergic to penicillins.    Follow Up   Return if symptoms worsen or fail to improve.  Patient was given instructions and counseling regarding her condition or for health maintenance advice. Please see specific information pulled into the AVS if appropriate.     Electronically signed by JANESSA Godwin, 03/28/22, 12:26 PM EDT.

## 2022-03-29 LAB
LABCORP SARS-COV-2, NAA 2 DAY TAT: NORMAL
SARS-COV-2 RNA RESP QL NAA+PROBE: NOT DETECTED

## 2022-04-25 ENCOUNTER — OFFICE VISIT (OUTPATIENT)
Dept: FAMILY MEDICINE CLINIC | Facility: CLINIC | Age: 72
End: 2022-04-25

## 2022-04-25 VITALS
BODY MASS INDEX: 25.1 KG/M2 | HEART RATE: 84 BPM | DIASTOLIC BLOOD PRESSURE: 88 MMHG | OXYGEN SATURATION: 98 % | HEIGHT: 64 IN | SYSTOLIC BLOOD PRESSURE: 138 MMHG | WEIGHT: 147 LBS | TEMPERATURE: 98 F

## 2022-04-25 DIAGNOSIS — K21.9 GASTROESOPHAGEAL REFLUX DISEASE, UNSPECIFIED WHETHER ESOPHAGITIS PRESENT: ICD-10-CM

## 2022-04-25 DIAGNOSIS — R05.9 COUGH: ICD-10-CM

## 2022-04-25 DIAGNOSIS — J01.10 ACUTE NON-RECURRENT FRONTAL SINUSITIS: Primary | ICD-10-CM

## 2022-04-25 PROCEDURE — 99213 OFFICE O/P EST LOW 20 MIN: CPT | Performed by: NURSE PRACTITIONER

## 2022-04-25 RX ORDER — GUAIFENESIN 600 MG/1
1200 TABLET, EXTENDED RELEASE ORAL AS NEEDED
COMMUNITY
End: 2022-05-02

## 2022-04-25 RX ORDER — TRIAMCINOLONE ACETONIDE 55 UG/1
2 SPRAY, METERED NASAL DAILY
COMMUNITY

## 2022-04-25 RX ORDER — DOXYCYCLINE HYCLATE 100 MG/1
100 CAPSULE ORAL 2 TIMES DAILY
Qty: 10 CAPSULE | Refills: 0 | Status: SHIPPED | OUTPATIENT
Start: 2022-04-25 | End: 2022-04-30

## 2022-04-25 RX ORDER — OMEPRAZOLE 20 MG/1
20 CAPSULE, DELAYED RELEASE ORAL DAILY
Qty: 90 CAPSULE | Refills: 0 | Status: SHIPPED | OUTPATIENT
Start: 2022-04-25 | End: 2022-05-23

## 2022-04-25 NOTE — PROGRESS NOTES
Chief Complaint  Cough (~1.5W productive cough, congestion, sore throat, voice change. Neg covid test Sat. Vaccinated and boosted. Taking sudafed, mucinix, nasacort, and albuterol neb Qam ~4 days )  Masks/face shield/appropriate PPE were worn for the entirety of the visit by the patient, MA, and provider.       Subjective          Snow Anderson presents to [facility]  History of Present Illness   Snow Anderson is a 71 y.o. female presents to the clinic today with complaints of nasal congestion, productive cough, sore throat, and voice change.  Patient recently went to see her kids and grandkids who both recently had some kind of upper respiratory infection.  They were tested for COVID-19 and were negative.  Patient's symptoms started a week and a half ago and started with a cough.  She states the cough is under control, but she is now having a sinus congestion as well as thick nasal secretions that are yellow in color.  She has been doing nasal lavages.  She noticed a change in her voice starting yesterday.  She has started taking Sudafed because of the congestion and has been monitoring her blood pressure.  She states that Sudafed has helped somewhat.  Her sore throat is slightly improving.  She has had a low-grade fever, but not severe.  When she does cough, she states it is more clear in color.  She is feeling fatigued and feels as if she just wants to go to sleep.  She denies chills.  She has also started taking Mucinex, Nasacort, and albuterol.    Review of Systems   Constitutional: Negative.    HENT: Positive for congestion, sore throat and voice change.    Eyes: Negative.    Respiratory: Positive for cough.    Cardiovascular: Negative.    Gastrointestinal: Negative.    Endocrine: Negative.    Genitourinary: Negative.    Musculoskeletal: Negative.    Skin: Negative.    Allergic/Immunologic: Negative.    Neurological: Negative.    Hematological: Negative.   "  Psychiatric/Behavioral: Negative.        Objective   Vital Signs:   /88   Pulse 84   Temp 98 °F (36.7 °C)   Ht 162.6 cm (64\")   Wt 66.7 kg (147 lb)   SpO2 98%   BMI 25.23 kg/m²     BMI is within normal parameters. No follow-up required.      Physical Exam  Constitutional:       General: She is not in acute distress.     Appearance: Normal appearance. She is not ill-appearing, toxic-appearing or diaphoretic.   HENT:      Head: Normocephalic and atraumatic.      Right Ear: Tympanic membrane, ear canal and external ear normal.      Left Ear: Tympanic membrane, ear canal and external ear normal.      Nose: Congestion present.      Right Turbinates: Enlarged.      Left Turbinates: Enlarged.      Mouth/Throat:      Pharynx: Posterior oropharyngeal erythema present.   Eyes:      General: No scleral icterus.        Right eye: No discharge.         Left eye: No discharge.      Extraocular Movements: Extraocular movements intact.   Cardiovascular:      Rate and Rhythm: Normal rate and regular rhythm.      Heart sounds: Normal heart sounds. No murmur heard.  Pulmonary:      Effort: No respiratory distress.      Breath sounds: Normal breath sounds. No stridor. No rhonchi.   Neurological:      General: No focal deficit present.      Mental Status: She is alert and oriented to person, place, and time.      Motor: No weakness.      Gait: Gait normal.   Psychiatric:         Mood and Affect: Mood normal.         Behavior: Behavior normal.        Result Review :                 Assessment and Plan    Diagnoses and all orders for this visit:    1. Acute non-recurrent frontal sinusitis (Primary)  -     doxycycline (VIBRAMYCIN) 100 MG capsule; Take 1 capsule by mouth 2 (Two) Times a Day for 5 days.  Dispense: 10 capsule; Refill: 0    2. Cough  -     omeprazole (priLOSEC) 20 MG capsule; Take 1 capsule by mouth Daily.  Dispense: 90 capsule; Refill: 0    3. Gastroesophageal reflux disease, unspecified whether esophagitis " present  -     omeprazole (priLOSEC) 20 MG capsule; Take 1 capsule by mouth Daily.  Dispense: 90 capsule; Refill: 0      Patient presents with symptoms of sinus congestion and thick nasal secretions that have been ongoing for a week and a half.  Patient's lung sounds are clear on auscultation today.  She does have nasal congestion.  Due to length of symptoms, I do think it is appropriate to treat patient with an antibiotic today.  Patient is allergic to penicillins, so doxycycline has been ordered today.  Patient was advised to call if she develops any stomach pain or diarrhea on this medication.  Advised patient not to take Sudafed long-term and to continue to monitor her blood pressures.  I also encouraged her to continue Mucinex and increase fluid intake.  Patient is in need of omeprazole refill today.  Patient advised to call if symptoms worsen or do not improve.    Follow Up   Return if symptoms worsen or fail to improve.  Patient was given instructions and counseling regarding her condition or for health maintenance advice. Please see specific information pulled into the AVS if appropriate.     Electronically signed by JANESSA Godwin, 04/25/22, 2:52 PM EDT.

## 2022-05-02 ENCOUNTER — OFFICE VISIT (OUTPATIENT)
Dept: FAMILY MEDICINE CLINIC | Facility: CLINIC | Age: 72
End: 2022-05-02

## 2022-05-02 VITALS
OXYGEN SATURATION: 97 % | RESPIRATION RATE: 16 BRPM | TEMPERATURE: 97.1 F | BODY MASS INDEX: 24.37 KG/M2 | DIASTOLIC BLOOD PRESSURE: 90 MMHG | SYSTOLIC BLOOD PRESSURE: 138 MMHG | WEIGHT: 142 LBS | HEART RATE: 73 BPM

## 2022-05-02 DIAGNOSIS — J40 BRONCHITIS: Primary | ICD-10-CM

## 2022-05-02 DIAGNOSIS — T78.40XD ALLERGY, SUBSEQUENT ENCOUNTER: ICD-10-CM

## 2022-05-02 PROCEDURE — 99213 OFFICE O/P EST LOW 20 MIN: CPT

## 2022-05-02 RX ORDER — METHYLPREDNISOLONE 4 MG/1
TABLET ORAL
Qty: 1 EACH | Refills: 0 | Status: SHIPPED | OUTPATIENT
Start: 2022-05-02 | End: 2022-05-23

## 2022-05-02 RX ORDER — BUDESONIDE 0.25 MG/2ML
0.25 INHALANT ORAL 2 TIMES DAILY
COMMUNITY
End: 2022-05-23

## 2022-05-02 NOTE — PROGRESS NOTES
Chief Complaint  Cough (Took a Covid test at home yesterday was neg, drainage, weak, low blood pressure. )    Subjective          Snow Anderson presents to [facility]  History of Present Illness     Patient presents the office for 1 week follow-up on sinusitis and cough.  Patient was seen by Oly RIDDLE 1 week ago due to sinusitis and was treated with doxycycline.  Patient felt like the Doxy did help her drainage turned from yellow to clear and her cough has improved a bit as well.  She was also having generalized weakness from the virus throughout the week and especially 2 days ago.  2 days ago she also had some GI upset and throwing up however since then she started to regain her strength and feel better.  Yesterday she said she had a hard time checking her blood pressure and it when it read but then when it did read it was 113/74.  This morning it was 109/76 at home.  Patient has a history of really bad allergies that are uncontrolled and she has stopped taking her allergy shots recently and now her allergies have been flaring even more contributing to all of her symptoms.  She has been taking budesonide twice a day along with her Advair inhaler daily Nasacort and Allegra.  She also takes Benadryl Sudafed at night which helps her sleep well.  Overall today she says that her symptoms are improving but she still has the drainage and the cough which she believes may be more so from her allergies.  She does have an appointment with her allergist this afternoon.  Patient did a COVID test yesterday which was negative.  Patient denies any fever, chills, muscle aches.  No shortness of breath or wheezing.  No loss of taste and smell.    With her blood pressure being 109/76 she did hold it this morning however in the office it is slightly elevated at 138/90.  Patient denies any lightheadedness or dizziness.    Objective   Vital Signs:   /90   Pulse 73   Temp 97.1 °F (36.2 °C)    Resp 16   Wt 64.4 kg (142 lb)   SpO2 97%   BMI 24.37 kg/m²     Physical Exam  Vitals reviewed.   Constitutional:       General: She is not in acute distress.  HENT:      Head: Normocephalic.      Right Ear: A middle ear effusion is present.      Left Ear: A middle ear effusion is present.      Mouth/Throat:      Pharynx: Posterior oropharyngeal erythema present.   Cardiovascular:      Rate and Rhythm: Normal rate and regular rhythm.      Pulses: Normal pulses.      Heart sounds: Normal heart sounds.   Pulmonary:      Effort: Pulmonary effort is normal.      Breath sounds: Normal breath sounds.   Musculoskeletal:         General: Normal range of motion.   Skin:     General: Skin is dry.   Neurological:      General: No focal deficit present.      Mental Status: She is alert.   Psychiatric:         Mood and Affect: Mood normal.        Result Review :                 Assessment and Plan    Diagnoses and all orders for this visit:    1. Bronchitis (Primary)  -     methylPREDNISolone (MEDROL) 4 MG dose pack; Take as directed on package instructions.  Dispense: 1 each; Refill: 0    2. Allergy, subsequent encounter  -     methylPREDNISolone (MEDROL) 4 MG dose pack; Take as directed on package instructions.  Dispense: 1 each; Refill: 0      Pleasant 71-year-old female presents the clinic to follow-up on sinusitis which seems to have cleared up.  However she does seem to have some residual bronchitis with her cough and probably also due to uncontrolled allergies.  Sending in Medrol Dosepak to help with bronchitis and allergies.  No need for further antibiotics at this time since symptoms are gradually improving.  Counseled patient if symptoms do not continue to improve or worsen return to the clinic.  Blood pressure is looking great today at 138/90 and even slightly elevated.  Counseled patient if blood pressure over 130/80 okay to continue with chlorthalidone and continue to check blood pressures at home.  At next  visit bring blood pressure cuff into the clinic so we can compare with the manual and make sure it is reading accurately.  Continue to follow-up with allergist this afternoon.  If cough continues or does not improve may need chest x-ray in the future but she just had a chest x-ray to complete in December 2021 which was clear so no indication for 1 today.      BMI is within normal parameters. No follow-up required.         Follow Up   Return if symptoms worsen or fail to improve.  Patient was given instructions and counseling regarding her condition or for health maintenance advice. Please see specific information pulled into the AVS if appropriate.     Medical assistant and I wore mask and eyewear protection during entire encounter.  Patient wore mask.      Electronically signed by JANESSA Delgado, 05/02/22, 10:05 AM EDT.

## 2022-05-03 DIAGNOSIS — I10 ESSENTIAL HYPERTENSION: ICD-10-CM

## 2022-05-03 RX ORDER — POTASSIUM CHLORIDE 20 MEQ/1
TABLET, EXTENDED RELEASE ORAL
Qty: 90 TABLET | Refills: 0 | Status: SHIPPED | OUTPATIENT
Start: 2022-05-03 | End: 2022-07-20

## 2022-05-03 RX ORDER — CHLORTHALIDONE 25 MG/1
TABLET ORAL
Qty: 90 TABLET | Refills: 0 | Status: SHIPPED | OUTPATIENT
Start: 2022-05-03 | End: 2022-07-20

## 2022-05-19 ENCOUNTER — TELEPHONE (OUTPATIENT)
Dept: FAMILY MEDICINE CLINIC | Facility: CLINIC | Age: 72
End: 2022-05-19

## 2022-05-19 ENCOUNTER — HOSPITAL ENCOUNTER (OUTPATIENT)
Facility: HOSPITAL | Age: 72
Setting detail: OBSERVATION
Discharge: HOME | End: 2022-05-20
Attending: EMERGENCY MEDICINE | Admitting: EMERGENCY MEDICINE

## 2022-05-19 ENCOUNTER — APPOINTMENT (OUTPATIENT)
Dept: GENERAL RADIOLOGY | Facility: HOSPITAL | Age: 72
End: 2022-05-19

## 2022-05-19 DIAGNOSIS — R07.9 CHEST PAIN, UNSPECIFIED TYPE: Primary | ICD-10-CM

## 2022-05-19 LAB
ALBUMIN SERPL-MCNC: 3.9 G/DL (ref 3.5–5.2)
ALBUMIN/GLOB SERPL: 1.3 G/DL
ALP SERPL-CCNC: 96 U/L (ref 39–117)
ALT SERPL W P-5'-P-CCNC: 28 U/L (ref 1–33)
ANION GAP SERPL CALCULATED.3IONS-SCNC: 9.7 MMOL/L (ref 5–15)
AST SERPL-CCNC: 24 U/L (ref 1–32)
BASOPHILS # BLD AUTO: 0.03 10*3/MM3 (ref 0–0.2)
BASOPHILS NFR BLD AUTO: 0.4 % (ref 0–1.5)
BILIRUB SERPL-MCNC: 0.5 MG/DL (ref 0–1.2)
BUN SERPL-MCNC: 24 MG/DL (ref 8–23)
BUN/CREAT SERPL: 26.1 (ref 7–25)
CALCIUM SPEC-SCNC: 9.5 MG/DL (ref 8.6–10.5)
CHLORIDE SERPL-SCNC: 97 MMOL/L (ref 98–107)
CO2 SERPL-SCNC: 27.3 MMOL/L (ref 22–29)
CREAT SERPL-MCNC: 0.92 MG/DL (ref 0.57–1)
DEPRECATED RDW RBC AUTO: 43.5 FL (ref 37–54)
EGFRCR SERPLBLD CKD-EPI 2021: 65.9 ML/MIN/1.73
EOSINOPHIL # BLD AUTO: 0.26 10*3/MM3 (ref 0–0.4)
EOSINOPHIL NFR BLD AUTO: 3.2 % (ref 0.3–6.2)
ERYTHROCYTE [DISTWIDTH] IN BLOOD BY AUTOMATED COUNT: 12.7 % (ref 12.3–15.4)
GLOBULIN UR ELPH-MCNC: 2.9 GM/DL
GLUCOSE SERPL-MCNC: 90 MG/DL (ref 65–99)
HCT VFR BLD AUTO: 42.8 % (ref 34–46.6)
HGB BLD-MCNC: 14 G/DL (ref 12–15.9)
IMM GRANULOCYTES # BLD AUTO: 0.05 10*3/MM3 (ref 0–0.05)
IMM GRANULOCYTES NFR BLD AUTO: 0.6 % (ref 0–0.5)
LIPASE SERPL-CCNC: 54 U/L (ref 13–60)
LYMPHOCYTES # BLD AUTO: 1.23 10*3/MM3 (ref 0.7–3.1)
LYMPHOCYTES NFR BLD AUTO: 14.9 % (ref 19.6–45.3)
MCH RBC QN AUTO: 30.8 PG (ref 26.6–33)
MCHC RBC AUTO-ENTMCNC: 32.7 G/DL (ref 31.5–35.7)
MCV RBC AUTO: 94.1 FL (ref 79–97)
MONOCYTES # BLD AUTO: 0.78 10*3/MM3 (ref 0.1–0.9)
MONOCYTES NFR BLD AUTO: 9.5 % (ref 5–12)
NEUTROPHILS NFR BLD AUTO: 5.88 10*3/MM3 (ref 1.7–7)
NEUTROPHILS NFR BLD AUTO: 71.4 % (ref 42.7–76)
NRBC BLD AUTO-RTO: 0 /100 WBC (ref 0–0.2)
PLATELET # BLD AUTO: 252 10*3/MM3 (ref 140–450)
PMV BLD AUTO: 9.4 FL (ref 6–12)
POTASSIUM SERPL-SCNC: 3.7 MMOL/L (ref 3.5–5.2)
PROT SERPL-MCNC: 6.8 G/DL (ref 6–8.5)
RBC # BLD AUTO: 4.55 10*6/MM3 (ref 3.77–5.28)
SARS-COV-2 RNA RESP QL NAA+PROBE: NOT DETECTED
SODIUM SERPL-SCNC: 134 MMOL/L (ref 136–145)
TROPONIN T SERPL-MCNC: <0.01 NG/ML (ref 0–0.03)
TROPONIN T SERPL-MCNC: <0.01 NG/ML (ref 0–0.03)
WBC NRBC COR # BLD: 8.23 10*3/MM3 (ref 3.4–10.8)

## 2022-05-19 PROCEDURE — 85025 COMPLETE CBC W/AUTO DIFF WBC: CPT | Performed by: EMERGENCY MEDICINE

## 2022-05-19 PROCEDURE — 93010 ELECTROCARDIOGRAM REPORT: CPT | Performed by: INTERNAL MEDICINE

## 2022-05-19 PROCEDURE — 71045 X-RAY EXAM CHEST 1 VIEW: CPT

## 2022-05-19 PROCEDURE — 93005 ELECTROCARDIOGRAM TRACING: CPT | Performed by: EMERGENCY MEDICINE

## 2022-05-19 PROCEDURE — 83690 ASSAY OF LIPASE: CPT | Performed by: EMERGENCY MEDICINE

## 2022-05-19 PROCEDURE — 84484 ASSAY OF TROPONIN QUANT: CPT | Performed by: EMERGENCY MEDICINE

## 2022-05-19 PROCEDURE — C9803 HOPD COVID-19 SPEC COLLECT: HCPCS

## 2022-05-19 PROCEDURE — 99284 EMERGENCY DEPT VISIT MOD MDM: CPT

## 2022-05-19 PROCEDURE — 93005 ELECTROCARDIOGRAM TRACING: CPT | Performed by: NURSE PRACTITIONER

## 2022-05-19 PROCEDURE — G0378 HOSPITAL OBSERVATION PER HR: HCPCS

## 2022-05-19 PROCEDURE — U0005 INFEC AGEN DETEC AMPLI PROBE: HCPCS | Performed by: EMERGENCY MEDICINE

## 2022-05-19 PROCEDURE — 99203 OFFICE O/P NEW LOW 30 MIN: CPT | Performed by: INTERNAL MEDICINE

## 2022-05-19 PROCEDURE — U0003 INFECTIOUS AGENT DETECTION BY NUCLEIC ACID (DNA OR RNA); SEVERE ACUTE RESPIRATORY SYNDROME CORONAVIRUS 2 (SARS-COV-2) (CORONAVIRUS DISEASE [COVID-19]), AMPLIFIED PROBE TECHNIQUE, MAKING USE OF HIGH THROUGHPUT TECHNOLOGIES AS DESCRIBED BY CMS-2020-01-R: HCPCS | Performed by: EMERGENCY MEDICINE

## 2022-05-19 PROCEDURE — 84484 ASSAY OF TROPONIN QUANT: CPT | Performed by: NURSE PRACTITIONER

## 2022-05-19 PROCEDURE — 36415 COLL VENOUS BLD VENIPUNCTURE: CPT

## 2022-05-19 PROCEDURE — 80053 COMPREHEN METABOLIC PANEL: CPT | Performed by: EMERGENCY MEDICINE

## 2022-05-19 RX ORDER — PANTOPRAZOLE SODIUM 40 MG/1
40 TABLET, DELAYED RELEASE ORAL EVERY MORNING
Refills: 0 | Status: DISCONTINUED | OUTPATIENT
Start: 2022-05-20 | End: 2022-05-20 | Stop reason: HOSPADM

## 2022-05-19 RX ORDER — CHLORTHALIDONE 25 MG/1
25 TABLET ORAL DAILY
Status: DISCONTINUED | OUTPATIENT
Start: 2022-05-19 | End: 2022-05-20 | Stop reason: HOSPADM

## 2022-05-19 RX ORDER — ACETAMINOPHEN 325 MG/1
650 TABLET ORAL EVERY 4 HOURS PRN
Status: DISCONTINUED | OUTPATIENT
Start: 2022-05-19 | End: 2022-05-20 | Stop reason: HOSPADM

## 2022-05-19 RX ORDER — SODIUM CHLORIDE 0.9 % (FLUSH) 0.9 %
10 SYRINGE (ML) INJECTION EVERY 12 HOURS SCHEDULED
Status: DISCONTINUED | OUTPATIENT
Start: 2022-05-19 | End: 2022-05-20 | Stop reason: HOSPADM

## 2022-05-19 RX ORDER — ONDANSETRON 2 MG/ML
4 INJECTION INTRAMUSCULAR; INTRAVENOUS EVERY 6 HOURS PRN
Status: DISCONTINUED | OUTPATIENT
Start: 2022-05-19 | End: 2022-05-20 | Stop reason: HOSPADM

## 2022-05-19 RX ORDER — CALCIUM CARBONATE 200(500)MG
2 TABLET,CHEWABLE ORAL ONCE
Status: COMPLETED | OUTPATIENT
Start: 2022-05-19 | End: 2022-05-19

## 2022-05-19 RX ORDER — POTASSIUM CHLORIDE 750 MG/1
20 TABLET, FILM COATED, EXTENDED RELEASE ORAL DAILY
Refills: 0 | Status: DISCONTINUED | OUTPATIENT
Start: 2022-05-20 | End: 2022-05-20 | Stop reason: HOSPADM

## 2022-05-19 RX ORDER — SODIUM CHLORIDE 0.9 % (FLUSH) 0.9 %
10 SYRINGE (ML) INJECTION AS NEEDED
Status: DISCONTINUED | OUTPATIENT
Start: 2022-05-19 | End: 2022-05-20 | Stop reason: HOSPADM

## 2022-05-19 RX ORDER — ACETAMINOPHEN 160 MG/5ML
650 SOLUTION ORAL EVERY 4 HOURS PRN
Status: DISCONTINUED | OUTPATIENT
Start: 2022-05-19 | End: 2022-05-20 | Stop reason: HOSPADM

## 2022-05-19 RX ORDER — NITROGLYCERIN 0.4 MG/1
0.4 TABLET SUBLINGUAL
Status: DISCONTINUED | OUTPATIENT
Start: 2022-05-19 | End: 2022-05-20 | Stop reason: HOSPADM

## 2022-05-19 RX ORDER — ACETAMINOPHEN 650 MG/1
650 SUPPOSITORY RECTAL EVERY 4 HOURS PRN
Status: DISCONTINUED | OUTPATIENT
Start: 2022-05-19 | End: 2022-05-20 | Stop reason: HOSPADM

## 2022-05-19 RX ADMIN — Medication 10 ML: at 20:58

## 2022-05-19 RX ADMIN — Medication 10 ML: at 15:11

## 2022-05-19 RX ADMIN — CALCIUM CARBONATE 2 TABLET: 500 TABLET, CHEWABLE ORAL at 18:21

## 2022-05-19 RX ADMIN — NITROGLYCERIN 0.4 MG: 0.4 TABLET SUBLINGUAL at 20:58

## 2022-05-19 NOTE — ED PROVIDER NOTES
EMERGENCY DEPARTMENT ENCOUNTER    Room Number:  114/1  PCP: Lilo Koehler MD  Historian: Patient  History Limited By: Nothing      HPI  Chief Complaint: Chest pain  Context: Snow Anderson is a 72 y.o. female who presents to the ED c/o chest pain.  Patient states she has had no history of cardiac disease.  States today about 6-7 o'clock this morning she developed chest pain.  Describes it as 10 out of 10 chest pain.  Pressure and tightness that went into her back and up into her neck.  States she did try some Tums with possible improvement.  Symptoms are significantly better now.  Now 1 out of 10.  States it did not get worse with deep breathing or position.  No worse with exertion.      Location: Substernal  Radiation: Back and neck  Character: Aching  Duration: Several hours  Severity: Severe  Progression: Improved  Aggravating Factors: Nothing  Alleviating Factors: Nothing        MEDICAL RECORD REVIEW    Patient with recent history of sinusitis bronchitis and cough          PAST MEDICAL HISTORY  Active Ambulatory Problems     Diagnosis Date Noted   • Atopic rhinitis 04/02/2016   • Carotid atherosclerosis 04/02/2016   • Hypertension 04/02/2016   • Impaired fasting glucose 04/02/2016   • Hypovitaminosis D 04/02/2016   • Medicare annual wellness visit, subsequent 04/02/2016   • Diverticulosis of large intestine without hemorrhage 05/31/2017   • Multiple thyroid nodules 12/11/2017   • Other hyperlipidemia 06/17/2021   • Anxiety 08/20/2021   • Nuclear cataract 11/01/2021   • Allergies 12/22/2021     Resolved Ambulatory Problems     Diagnosis Date Noted   • Acute cystitis with hematuria 03/12/2018   • Dysuria 08/31/2018     Past Medical History:   Diagnosis Date   • Abnormal kidney function    • Allergic 1970s   • Allergic rhinitis    • Arthritis    • Asthma 9/2019   • Atherosclerosis of both carotid arteries    • Atrophic vaginitis    • Bell's palsy    • Cataract couple of years   • Chronic kidney disease    •  Cystitis 2018   • Cystocele with rectocele 2016   • Diverticulosis    • Enterocele 2016   • GERD (gastroesophageal reflux disease)    • IFG (impaired fasting glucose)    • Low back pain    • Mitral valve prolapse    • Osteoporosis    • Plantar fasciitis    • Plantar wart 10/2016   • PONV (postoperative nausea and vomiting)    • Seborrheic keratosis    • Sensorineural hearing loss    • Umbilical hernia 2016   • Urge incontinence    • Vitamin D deficiency          PAST SURGICAL HISTORY  Past Surgical History:   Procedure Laterality Date   • ANTERIOR AND POSTERIOR VAGINAL REPAIR N/A 2016    WITH ABDOMINAL ENTEROCELE REPAIR, SACROSPINOUS LIGAMENT FIXATION, AND CYSTOURETHROSCOPY, DR. JAROCHO MORRIS AT [place]   • COLONOSCOPY N/A 2015    MULTIPLE SMALL AND LARGE SEVERE DIVERTICULA, EVIDENCE OF AN IMPACTED DIVERTICULUM WITHOUT BLEEDING, RESCOPE IN 5-10 YRS, DR. LIZBETH WRAY AT [facility]   • DILATATION AND CURETTAGE N/A 2007    REMOVAL OF CERVICAL POLYP, DR. SRAVANTHI CARRERO AT [facility]   • DILATATION AND CURETTAGE N/A    • ENDOMETRIAL ABLATION N/A    • HERNIA REPAIR  2016    with hysterectomy   • HYSTERECTOMY N/A 2016    LAPAROSCOPIC HYSTERECTOMY WITH BSO, DR. JAROCHO MORRIS AT [place]   • TUBAL ABDOMINAL LIGATION  Aug 1986   • UMBILICAL HERNIA REPAIR      repaired with hysterectomy          FAMILY HISTORY  Family History   Problem Relation Age of Onset   • Stroke Mother         Massive CVA;    • Hypertension Mother    • Hyperlipidemia Mother    • COPD Father    • Lung cancer Father    • Hearing loss Father    • Throat cancer Father         Under the tongue    • Heart attack Father    • Cancer Father         lung cancer   • Heart disease Father    • Alcohol abuse Brother    • Atrial fibrillation Sister    • No Known Problems Maternal Grandmother    • No Known Problems Maternal Grandfather    • Diabetes Paternal Grandmother         in old age   • No Known Problems Paternal  Grandfather    • Alcohol abuse Brother    • Alcohol abuse Brother            • Alcohol abuse Brother    • Breast cancer Neg Hx    • Colon cancer Neg Hx          SOCIAL HISTORY  Social History     Socioeconomic History   • Marital status:    Tobacco Use   • Smoking status: Never Smoker   • Smokeless tobacco: Never Used   Vaping Use   • Vaping Use: Never used   Substance and Sexual Activity   • Alcohol use: Yes     Alcohol/week: 1.0 standard drink     Types: 1 Glasses of wine per week     Comment: rare   • Drug use: No   • Sexual activity: Yes     Partners: Male     Birth control/protection: Post-menopausal     Comment:           ALLERGIES  Sulfa antibiotics and Penicillins        REVIEW OF SYSTEMS  Review of Systems   Constitutional: Negative for fever.   HENT: Positive for congestion and sinus pressure. Negative for sore throat.    Eyes: Negative.    Respiratory: Positive for cough. Negative for shortness of breath.    Cardiovascular: Negative for chest pain.   Gastrointestinal: Negative for abdominal pain, diarrhea and vomiting.   Genitourinary: Negative for dysuria.   Musculoskeletal: Negative for neck pain.   Skin: Negative for rash.   Allergic/Immunologic: Negative.    Neurological: Negative for weakness, numbness and headaches.   Hematological: Negative.    Psychiatric/Behavioral: Negative.    All other systems reviewed and are negative.           PHYSICAL EXAM  ED Triage Vitals [22 1201]   Temp Heart Rate Resp BP SpO2   98.5 °F (36.9 °C) 111 16 -- 95 %      Temp src Heart Rate Source Patient Position BP Location FiO2 (%)   -- -- -- -- --       Physical Exam  Vitals and nursing note reviewed.   Constitutional:       General: She is not in acute distress.  HENT:      Head: Normocephalic and atraumatic.   Eyes:      Pupils: Pupils are equal, round, and reactive to light.   Cardiovascular:      Rate and Rhythm: Normal rate and regular rhythm.      Heart sounds: Normal heart sounds.    Pulmonary:      Effort: Pulmonary effort is normal. No respiratory distress.      Breath sounds: Normal breath sounds.   Abdominal:      Palpations: Abdomen is soft.      Tenderness: There is no abdominal tenderness. There is no guarding or rebound.   Musculoskeletal:         General: Normal range of motion.      Cervical back: Normal range of motion and neck supple.   Skin:     General: Skin is warm and dry.      Findings: No rash.   Neurological:      Mental Status: She is alert and oriented to person, place, and time.      Sensory: Sensation is intact.      Motor: No weakness.   Psychiatric:         Mood and Affect: Mood and affect normal.       Patient was wearing a face mask when I entered the room and they continued to wear a mask throughout their stay in the ED.  I wore PPE, including gloves, face mask with shield or face mask with goggles whenever I was in the room with patient.     LAB RESULTS  Recent Results (from the past 24 hour(s))   ECG 12 Lead    Collection Time: 05/19/22 12:09 PM   Result Value Ref Range    QT Interval 380 ms   Comprehensive Metabolic Panel    Collection Time: 05/19/22 12:19 PM    Specimen: Blood   Result Value Ref Range    Glucose 90 65 - 99 mg/dL    BUN 24 (H) 8 - 23 mg/dL    Creatinine 0.92 0.57 - 1.00 mg/dL    Sodium 134 (L) 136 - 145 mmol/L    Potassium 3.7 3.5 - 5.2 mmol/L    Chloride 97 (L) 98 - 107 mmol/L    CO2 27.3 22.0 - 29.0 mmol/L    Calcium 9.5 8.6 - 10.5 mg/dL    Total Protein 6.8 6.0 - 8.5 g/dL    Albumin 3.90 3.50 - 5.20 g/dL    ALT (SGPT) 28 1 - 33 U/L    AST (SGOT) 24 1 - 32 U/L    Alkaline Phosphatase 96 39 - 117 U/L    Total Bilirubin 0.5 0.0 - 1.2 mg/dL    Globulin 2.9 gm/dL    A/G Ratio 1.3 g/dL    BUN/Creatinine Ratio 26.1 (H) 7.0 - 25.0    Anion Gap 9.7 5.0 - 15.0 mmol/L    eGFR 65.9 >60.0 mL/min/1.73   Lipase    Collection Time: 05/19/22 12:19 PM    Specimen: Blood   Result Value Ref Range    Lipase 54 13 - 60 U/L   Troponin    Collection Time: 05/19/22  12:19 PM    Specimen: Blood   Result Value Ref Range    Troponin T <0.010 0.000 - 0.030 ng/mL   CBC Auto Differential    Collection Time: 05/19/22 12:19 PM    Specimen: Blood   Result Value Ref Range    WBC 8.23 3.40 - 10.80 10*3/mm3    RBC 4.55 3.77 - 5.28 10*6/mm3    Hemoglobin 14.0 12.0 - 15.9 g/dL    Hematocrit 42.8 34.0 - 46.6 %    MCV 94.1 79.0 - 97.0 fL    MCH 30.8 26.6 - 33.0 pg    MCHC 32.7 31.5 - 35.7 g/dL    RDW 12.7 12.3 - 15.4 %    RDW-SD 43.5 37.0 - 54.0 fl    MPV 9.4 6.0 - 12.0 fL    Platelets 252 140 - 450 10*3/mm3    Neutrophil % 71.4 42.7 - 76.0 %    Lymphocyte % 14.9 (L) 19.6 - 45.3 %    Monocyte % 9.5 5.0 - 12.0 %    Eosinophil % 3.2 0.3 - 6.2 %    Basophil % 0.4 0.0 - 1.5 %    Immature Grans % 0.6 (H) 0.0 - 0.5 %    Neutrophils, Absolute 5.88 1.70 - 7.00 10*3/mm3    Lymphocytes, Absolute 1.23 0.70 - 3.10 10*3/mm3    Monocytes, Absolute 0.78 0.10 - 0.90 10*3/mm3    Eosinophils, Absolute 0.26 0.00 - 0.40 10*3/mm3    Basophils, Absolute 0.03 0.00 - 0.20 10*3/mm3    Immature Grans, Absolute 0.05 0.00 - 0.05 10*3/mm3    nRBC 0.0 0.0 - 0.2 /100 WBC   COVID-19,[facility] IN-HOUSE CEPHEID/ANAHI NP SWAB IN TRANSPORT MEDIA 8-12 HR TAT - Swab, Nasopharynx    Collection Time: 05/19/22  2:15 PM    Specimen: Nasopharynx; Swab   Result Value Ref Range    COVID19 Not Detected Not Detected - Ref. Range       Ordered the above labs and reviewed the results.        RADIOLOGY  XR Chest 1 View   Final Result   No focal pulmonary consolidation. Follow-up as clinical   indications persist.       This report was finalized on 5/19/2022 12:52 PM by Dr. Jt Merida M.D.               Ordered the above noted radiological studies. Reviewed by me in PACS.         PROCEDURES  Procedures      EKG:          EKG time: 1209  Rhythm/Rate: Normal sinus rhythm 78  P waves and ND: Normal P waves  QRS, axis: LVH  ST and T waves: Nonspecific ST-T wave    Interpreted Contemporaneously by me, independently viewed  No  prior        MEDICATIONS GIVEN IN ER  Medications   sodium chloride 0.9 % flush 10 mL (10 mL Intravenous Given 5/19/22 1511)   sodium chloride 0.9 % flush 10 mL (has no administration in time range)   acetaminophen (TYLENOL) tablet 650 mg (has no administration in time range)     Or   acetaminophen (TYLENOL) 160 MG/5ML solution 650 mg (has no administration in time range)     Or   acetaminophen (TYLENOL) suppository 650 mg (has no administration in time range)   nitroglycerin (NITROSTAT) SL tablet 0.4 mg (has no administration in time range)   ondansetron (ZOFRAN) injection 4 mg (has no administration in time range)             PROGRESS AND CONSULTS  ED Course as of 05/19/22 1557   Thu May 19, 2022   1427 14:28 EDT  Patient presents for chest pain of unclear etiology.  Patient states symptoms started out of rest.  Symptoms improved now.  Patient's EKG normal.  Troponin normal.  Patient discussed with Mellissa Adrian who will admit to observation unit. [SL]      ED Course User Index  [SL] Junior Silva MD           MEDICAL DECISION MAKING      MDM  Number of Diagnoses or Management Options     Amount and/or Complexity of Data Reviewed  Clinical lab tests: reviewed and ordered (Troponin normal)  Tests in the radiology section of CPT®: reviewed and ordered (Chest x-ray negative)  Discuss the patient with other providers: yes (Discussed with Mellissa Adrian with observation unit.)               DIAGNOSIS  Final diagnoses:   Chest pain, unspecified type           DISPOSITION  admit        Latest Documented Vital Signs:  As of 15:57 EDT  BP- 159/74 HR- 72 Temp- 98.4 °F (36.9 °C) (Oral) O2 sat- 100%                         Junior Silva MD  05/19/22 4540

## 2022-05-19 NOTE — TELEPHONE ENCOUNTER
Patient called stating she is experiencing chest pain. She stated she's been taking TUMS and CARBONATED WATER. They seem to help for a short period of time. Advised patient to go the ER or URGENT CARE to be evaluated.

## 2022-05-19 NOTE — CONSULTS
Patient Name: Snow Anderson  :1950  72 y.o.    Date of Admission: 2022  Encounter Provider: Nicole Head MD  Date of Encounter Visit: 22  Place of Service: [facility] CARDIOLOGY  Referring Provider: Eric Valentin II,*  Patient Care Team:  Lilo Koehler MD as PCP - General (Family Medicine)      Chief complaint: Chest Pain    Reason for consult: Chest pain    History of Present Illness:    This is a nice lady with hyperlipidemia, anxiety, carotid atherosclerosis, hypertension and Bell's palsy.  She started having discomfort this morning.  It was a pressure that went into her back and up into her neck.  She tried antacids with no improvement.  She denies any history of cardiac disease or work-up.  EKG was unremarkable.  Troponin negative.  Admitted to observation for further evaluation.    No previous cardiac testing.     Past Medical History:   Diagnosis Date   • Abnormal kidney function    • Allergic 1970s    Penecillin; sulfa; sinus related allergies   • Allergic rhinitis    • Arthritis    • Asthma 2019    allergy induced   • Atherosclerosis of both carotid arteries    • Atrophic vaginitis    • Bell's palsy    • Cataract couple of years    noted at eye exam. very mild. no action needed at this time.   • Chronic kidney disease    • Cystitis 2018   • Cystocele with rectocele 2016   • Diverticulosis    • Dysuria    • Enterocele 2016   • GERD (gastroesophageal reflux disease)     Mild. Taking pepsid. Controlled.   • Hypertension    • IFG (impaired fasting glucose)    • Low back pain 1980s    under control   • Mitral valve prolapse    • Multiple thyroid nodules     BILATERAL   • Osteoporosis    • Plantar fasciitis    • Plantar wart 10/2016    RIGHT FOOT   • PONV (postoperative nausea and vomiting)    • Seborrheic keratosis    • Sensorineural hearing loss     BILATERAL   • Umbilical hernia 2016   • Urge incontinence    • Vitamin D  deficiency        Past Surgical History:   Procedure Laterality Date   • ANTERIOR AND POSTERIOR VAGINAL REPAIR N/A 01/13/2016    WITH ABDOMINAL ENTEROCELE REPAIR, SACROSPINOUS LIGAMENT FIXATION, AND CYSTOURETHROSCOPY, DR. JAROCHO MORRIS AT [facility]   • COLONOSCOPY N/A 06/18/2015    MULTIPLE SMALL AND LARGE SEVERE DIVERTICULA, EVIDENCE OF AN IMPACTED DIVERTICULUM WITHOUT BLEEDING, RESCOPE IN 5-10 YRS, DR. LIZBETH WRAY AT [facility]   • DILATATION AND CURETTAGE N/A 11/20/2007    REMOVAL OF CERVICAL POLYP, DR. SRAVANTHI CARRERO AT [facility]   • DILATATION AND CURETTAGE N/A 1981   • ENDOMETRIAL ABLATION N/A 1990   • HERNIA REPAIR  Jan 2016    with hysterectomy   • HYSTERECTOMY N/A 01/13/2016    LAPAROSCOPIC HYSTERECTOMY WITH BSO, DR. JAROCHO MORRIS AT [facility]   • TUBAL ABDOMINAL LIGATION  Aug 1986   • UMBILICAL HERNIA REPAIR      repaired with hysterectomy 2016         Prior to Admission medications    Medication Sig Start Date End Date Taking? Authorizing Provider   albuterol sulfate  (90 Base) MCG/ACT inhaler Every 6 (Six) Hours As Needed. 10/2/19   Karel Contreras MD   aspirin 81 MG tablet Take  by mouth 1 (One) Time Per Week. 10/4/13   Karel Contreras MD   Azelastine HCl 137 MCG/SPRAY solution As Needed. 6/15/20   Karel Contreras MD   Biotin 1 MG capsule Daily.    Karel Contreras MD   budesonide (PULMICORT) 0.25 MG/2ML nebulizer solution Take 0.25 mg by nebulization 2 (Two) Times a Day.    Karel Contreras MD   chlorthalidone (HYGROTON) 25 MG tablet Take 1 tablet by mouth once daily 5/3/22   Lilo Koehler MD   Cholecalciferol (VITAMIN D) 1000 UNITS tablet Take 2,000 Units by mouth. 10/4/13   Karel Contreras MD   fexofenadine (ALLEGRA) 180 MG tablet  4/29/18   Karel Contreras MD   fluticasone-salmeterol (ADVAIR) 500-50 MCG/DOSE DISKUS INHALE 1 PUFF BY MOUTH TWICE DAILY IN THE MORNING AND IN THE EVENING APPROXIMATELY 12 HOURS APART. RINSE MOUTH OUT AFTER EACH USE. 1/27/22   Karel Contreras  MD   hydrocortisone 2.5 % cream As Needed. 20   Karel Contreras MD   methylPREDNISolone (MEDROL) 4 MG dose pack Take as directed on package instructions. 22   Tanya Pool APRN   Multiple Vitamin (MULTI VITAMIN DAILY PO) Take  by mouth. 10/4/13   Karel Contreras MD   omeprazole (priLOSEC) 20 MG capsule Take 1 capsule by mouth Daily. 22   Oly Barr APRN   potassium chloride (K-DUR,KLOR-CON) 20 MEQ CR tablet Take 1 tablet by mouth once daily 5/3/22   Lilo Koehler MD   Triamcinolone Acetonide (NASACORT) 55 MCG/ACT nasal inhaler 2 sprays into the nostril(s) as directed by provider Daily.    ProviderKarel MD       Allergies   Allergen Reactions   • Sulfa Antibiotics Other (See Comments)     DECREASED WBC'S   • Penicillins Rash       Social History     Socioeconomic History   • Marital status:    Tobacco Use   • Smoking status: Never Smoker   • Smokeless tobacco: Never Used   Vaping Use   • Vaping Use: Never used   Substance and Sexual Activity   • Alcohol use: Yes     Alcohol/week: 1.0 standard drink     Types: 1 Glasses of wine per week     Comment: rare   • Drug use: No   • Sexual activity: Yes     Partners: Male     Birth control/protection: Post-menopausal     Comment:         Family History   Problem Relation Age of Onset   • Stroke Mother         Massive CVA;    • Hypertension Mother    • Hyperlipidemia Mother    • COPD Father    • Lung cancer Father    • Hearing loss Father    • Throat cancer Father         Under the tongue    • Heart attack Father    • Cancer Father         lung cancer   • Heart disease Father    • Alcohol abuse Brother    • Atrial fibrillation Sister    • No Known Problems Maternal Grandmother    • No Known Problems Maternal Grandfather    • Diabetes Paternal Grandmother         in old age   • No Known Problems Paternal Grandfather    • Alcohol abuse Brother    • Alcohol abuse Brother            • Alcohol abuse  "Brother    • Breast cancer Neg Hx    • Colon cancer Neg Hx        REVIEW OF SYSTEMS:   All other systems reviewed and negative.        Objective:     Vitals:    05/19/22 1217 05/19/22 1225 05/19/22 1408 05/19/22 1454   BP:  168/97  159/74   BP Location:    Left arm   Patient Position:    Lying   Pulse:  84  72   Resp:    16   Temp:    98.4 °F (36.9 °C)   TempSrc:    Oral   SpO2:  95%  100%   Weight: 64.4 kg (142 lb)  64.4 kg (142 lb) 64.4 kg (142 lb)   Height: 162.6 cm (64.02\")   162.6 cm (64\")     Body mass index is 24.37 kg/m².  No intake or output data in the 24 hours ending 05/19/22 1553    Constitutional: She is oriented to person, place, and time. She appears well-developed. She does not appear ill.   HENT:   Head: Normocephalic and atraumatic. Head is without contusion.   Right Ear: Hearing normal. No drainage.   Left Ear: Hearing normal. No drainage.   Nose: No nasal deformity. No epistaxis.   Eyes: Lids are normal. Right eye exhibits no exudate. Left eye exhibits no exudate.  Neck: No JVD present. Carotid bruit is not present. No tracheal deviation present. No thyroid mass and no thyromegaly present.   Cardiovascular: Normal rate, regular rhythm and normal heart sounds.    Pulses:       Posterior tibial pulses are 2+ on the right side, and 2+ on the left side.   Pulmonary/Chest: Effort normal and breath sounds normal.   Abdominal: Soft. Normal appearance and bowel sounds are normal. There is no tenderness.   Musculoskeletal: Normal range of motion.        Right shoulder: She exhibits no deformity.        Left shoulder: She exhibits no deformity.   Neurological: She is alert and oriented to person, place, and time. She has normal strength.   Skin: Skin is warm, dry and intact. No rash noted.   Psychiatric: She has a normal mood and affect. Her behavior is normal. Thought content normal.   Vitals reviewed      Lab Review:     Results from last 7 days   Lab Units 05/19/22  1219   SODIUM mmol/L 134*   POTASSIUM " mmol/L 3.7   CHLORIDE mmol/L 97*   CO2 mmol/L 27.3   BUN mg/dL 24*   CREATININE mg/dL 0.92   CALCIUM mg/dL 9.5   BILIRUBIN mg/dL 0.5   ALK PHOS U/L 96   ALT (SGPT) U/L 28   AST (SGOT) U/L 24   GLUCOSE mg/dL 90     Results from last 7 days   Lab Units 05/19/22  1219   TROPONIN T ng/mL <0.010     Results from last 7 days   Lab Units 05/19/22  1219   WBC 10*3/mm3 8.23   HEMOGLOBIN g/dL 14.0   HEMATOCRIT % 42.8   PLATELETS 10*3/mm3 252                  Admission EKG 05/19/2022:      Previous EKG 12/11/2017:        I personally viewed and interpreted the patient's EKG/Telemetry data.        Assessment and Plan:       1.  Chest discomfort.  Rule out for myocardial infarction.  Plan is for an exercise stress echo in the morning.  If that is normal, I would be comfortable with letting her go home.  2.  Hyperlipidemia  3.  Hypertension  4.  Carotid atherosclerosis.    Nicole Head MD  05/19/22  15:53 EDT

## 2022-05-19 NOTE — ED NOTES
Midsternal CP with radiation to neck that started this morning.     Patient was placed in face mask during triage process. Patient was wearing facemask when I entered the room and throughout our encounter. I wore full protective equipment throughout this patient encounter including a face mask, eye protection, and gloves. Hand hygiene was performed before donning protective equipment and again following doffing of PPE after leaving the room.

## 2022-05-19 NOTE — ED NOTES
"Nursing report ED to floor  Snow Anderson  72 y.o.  female    HPI :   Chief Complaint   Patient presents with   • Chest Pain       Admitting doctor:   Eric Valentin II, MD    Admitting diagnosis:   There were no encounter diagnoses.    Code status:   Current Code Status     Date Active Code Status Order ID Comments User Context       5/19/2022 1409 CPR (Attempt to Resuscitate) [id number]  English, Mellissa C, APRN ED     Advance Care Planning Activity      Questions for Current Code Status     Question Answer    Code Status (Patient has no pulse and is not breathing) CPR (Attempt to Resuscitate)    Medical Interventions (Patient has pulse or is breathing) Full Support          Allergies:   Sulfa antibiotics and Penicillins    Intake and Output  No intake or output data in the 24 hours ending 05/19/22 1409    Weight:       05/19/22  1217   Weight: 64.4 kg (142 lb)       Most recent vitals:   Vitals:    05/19/22 1201 05/19/22 1217 05/19/22 1225   BP:   168/97   Pulse: 111  84   Resp: 16     Temp: 98.5 °F (36.9 °C)     SpO2: 95%  95%   Weight:  64.4 kg (142 lb)    Height:  162.6 cm (64.02\")        Active LDAs/IV Access:   Lines, Drains & Airways     Active LDAs     Name Placement date Placement time Site Days    Peripheral IV 05/19/22 1217 Right Hand 05/19/22  1217  Hand  less than 1                Labs (abnormal labs have a star):   Labs Reviewed   COMPREHENSIVE METABOLIC PANEL - Abnormal; Notable for the following components:       Result Value    BUN 24 (*)     Sodium 134 (*)     Chloride 97 (*)     BUN/Creatinine Ratio 26.1 (*)     All other components within normal limits    Narrative:     GFR Normal >60  Chronic Kidney Disease <60  Kidney Failure <15     CBC WITH AUTO DIFFERENTIAL - Abnormal; Notable for the following components:    Lymphocyte % 14.9 (*)     Immature Grans % 0.6 (*)     All other components within normal limits   LIPASE - Normal   TROPONIN (IN-HOUSE) - Normal    Narrative:     Troponin T " Reference Range:  <= 0.03 ng/mL-   Negative for AMI  >0.03 ng/mL-     Abnormal for myocardial necrosis.  Clinicians would have to utilize clinical acumen, EKG, Troponin and serial changes to determine if it is an Acute Myocardial Infarction or myocardial injury due to an underlying chronic condition.       Results may be falsely decreased if patient taking Biotin.     COVID PRE-OP / PRE-PROCEDURE SCREENING ORDER (NO ISOLATION)    Narrative:     The following orders were created for panel order COVID PRE-OP / PRE-PROCEDURE SCREENING ORDER (NO ISOLATION) - Swab, Nasopharynx.  Procedure                               Abnormality         Status                     ---------                               -----------         ------                     COVID-19, ANNEMARIE IN-HOUSE...[[id number]]                                                   Please view results for these tests on the individual orders.   COVID-19, ANNEMARIE IN-HOUSE CEPHEID/ANAHI, NP SWAB IN TRANSPORT MEDIA 8-12 HR TAT   CBC AND DIFFERENTIAL    Narrative:     The following orders were created for panel order CBC & Differential.  Procedure                               Abnormality         Status                     ---------                               -----------         ------                     CBC Auto Differential[[id number]]        Abnormal            Final result                 Please view results for these tests on the individual orders.       EKG:   ECG 12 Lead   Preliminary Result   HEART RATE= 78  bpm   RR Interval= 764  ms   DC Interval= 139  ms   P Horizontal Axis= 27  deg   P Front Axis= 38  deg   QRSD Interval= 86  ms   QT Interval= 380  ms   QRS Axis= -14  deg   T Wave Axis= 40  deg   - BORDERLINE ECG -   Sinus rhythm   Left ventricular hypertrophy   Electronically Signed By:    Date and Time of Study: 2022-05-19 12:09:13          Meds given in ED:   Medications   sodium chloride 0.9 % flush 10 mL (has no administration in time range)   sodium  chloride 0.9 % flush 10 mL (has no administration in time range)   acetaminophen (TYLENOL) tablet 650 mg (has no administration in time range)     Or   acetaminophen (TYLENOL) 160 MG/5ML solution 650 mg (has no administration in time range)     Or   acetaminophen (TYLENOL) suppository 650 mg (has no administration in time range)   nitroglycerin (NITROSTAT) SL tablet 0.4 mg (has no administration in time range)   ondansetron (ZOFRAN) injection 4 mg (has no administration in time range)       Imaging results:  XR Chest 1 View    Result Date: 5/19/2022  No focal pulmonary consolidation. Follow-up as clinical indications persist.  This report was finalized on 5/19/2022 12:52 PM by Dr. Jt Merida M.D.        Ambulatory status:   - ad izabella     Social issues:   Social History     Socioeconomic History   • Marital status:    Tobacco Use   • Smoking status: Never Smoker   • Smokeless tobacco: Never Used   Vaping Use   • Vaping Use: Never used   Substance and Sexual Activity   • Alcohol use: Yes     Alcohol/week: 1.0 standard drink     Types: 1 Glasses of wine per week     Comment: rare   • Drug use: No   • Sexual activity: Yes     Partners: Male     Birth control/protection: Post-menopausal     Comment:

## 2022-05-19 NOTE — TELEPHONE ENCOUNTER
Patient is currently at [facility] ER they are going to keep her overnight for observation and refer her to cardio all preliminary reports have been normal regarding her heart. She is not having any sob, nausea or sweating. Chest pain just started this morning and did radiate to her jaw and neck.

## 2022-05-19 NOTE — CASE MANAGEMENT/SOCIAL WORK
Discharge Planning Assessment  [facility]     Patient Name: Snow Anderson  MRN: [id number]  Today's Date: 5/19/2022    Admit Date: 5/19/2022     Discharge Needs Assessment     Row Name 05/19/22 1412       Living Environment    People in Home spouse    Current Living Arrangements home    Primary Care Provided by self    Provides Primary Care For no one    Family Caregiver if Needed spouse    Quality of Family Relationships supportive    Able to Return to Prior Arrangements yes       Resource/Environmental Concerns    Resource/Environmental Concerns none       Transition Planning    Patient/Family Anticipates Transition to home with family    Patient/Family Anticipated Services at Transition none    Transportation Anticipated car, drives self       Discharge Needs Assessment    Equipment Currently Used at Home none    Concerns to be Addressed no discharge needs identified    Anticipated Changes Related to Illness none    Equipment Needed After Discharge none    Provided Post Acute Provider List? N/A    Provided Post Acute Provider Quality & Resource List? N/A               Discharge Plan     Row Name 05/19/22 1413       Plan    Plan Introduced self and explained role of CCP. PPE used. Info on facesheet verified    Provided Post Acute Provider List? N/A    Provided Post Acute Provider Quality & Resource List? N/A    Plan Comments Lives at home w/ spouse and plans to return at d/c. Plans to drive self home. Has steps at home and able to navigate them w/o difficulty. Independent w/ ADLs. No assistive devices used. Denies any financial concerns. Denies any d/c needs              Continued Care and Services - Admitted Since 5/19/2022    Coordination has not been started for this encounter.          Demographic Summary    No documentation.                Functional Status    No documentation.                Psychosocial    No documentation.                Abuse/Neglect    No documentation.                Legal    No  documentation.                Substance Abuse    No documentation.                Patient Forms    No documentation.                   Carol Shabazz RN

## 2022-05-19 NOTE — TELEPHONE ENCOUNTER
I agree that patient with chest pain should go to get evaluated emergently.  However does not like her symptoms probably are from reflux.  Does she have any shortness of breath, nausea, sweating?  Any radiation to her jaw or neck?  How long has she been having chest pain?

## 2022-05-19 NOTE — PLAN OF CARE
Goal Outcome Evaluation:  Plan of Care Reviewed With: patient   VSS; A&O x4; pt adlib; pt denies pain since arrival; pt c/o pressure on her midsternum; pt seen by cardiology; pt to have echo.      Progress: improving

## 2022-05-19 NOTE — H&P
.   [facility]   HISTORY AND PHYSICAL    Patient Name: Snow Anderson  : 1950  MRN: [id number]  Primary Care Physician:  Lilo Koehler MD  Date of admission: 2022    Subjective   Subjective     Chief Complaint: Chest pain    HPI:    Snow Anderson is a 72 y.o. female with past medical history including but limited to asthma, arthritis, hypertension, mitral valve prolapse presents to [facility] with chest pain.  Patient reports sudden onset left anterior chest pain around 6:30 AM this morning.  Patient describes her chest pain as heaviness and tightness that radiates into her left neck, shoulder, and left arm.  Patient denies any associated nausea, vomiting, diaphoresis, or dyspnea.  Nothing exacerbates her symptoms.  Reports her symptoms lasted an hour and resolved after taking some Tums.  Patient reports throughout the day her pain has been intermittent.  Denies any cardiac history.  Currently patient rates her pain 2 on a scale 0-10.  Denies palpitation, dyspnea, or abdominal pain.  Denies nausea, vomiting, or diarrhea.  Denies cough, chills, or lower extremities edema.  Denies dysuria, hematuria, or increased urinary frequency/urgency.      Review of Systems   All systems were reviewed and negative except for: All systems were reviewed and negative except for the mentioned above in HPI    Personal History     Past Medical History:   Diagnosis Date   • Abnormal kidney function    • Allergic 1970s    Penecillin; sulfa; sinus related allergies   • Allergic rhinitis    • Arthritis    • Asthma 2019    allergy induced   • Atherosclerosis of both carotid arteries    • Atrophic vaginitis    • Bell's palsy    • Cataract couple of years    noted at eye exam. very mild. no action needed at this time.   • Chronic kidney disease    • Cystitis 2018   • Cystocele with rectocele 2016   • Diverticulosis    • Dysuria    • Enterocele 2016   • GERD (gastroesophageal reflux disease)      Mild. Taking pepsid. Controlled.   • Hypertension    • IFG (impaired fasting glucose)    • Low back pain 1980s    under control   • Mitral valve prolapse    • Multiple thyroid nodules 2017    BILATERAL   • Osteoporosis    • Plantar fasciitis    • Plantar wart 10/2016    RIGHT FOOT   • PONV (postoperative nausea and vomiting)    • Seborrheic keratosis    • Sensorineural hearing loss     BILATERAL   • Umbilical hernia 02/04/2016   • Urge incontinence    • Vitamin D deficiency        Past Surgical History:   Procedure Laterality Date   • ANTERIOR AND POSTERIOR VAGINAL REPAIR N/A 01/13/2016    WITH ABDOMINAL ENTEROCELE REPAIR, SACROSPINOUS LIGAMENT FIXATION, AND CYSTOURETHROSCOPY, DR. JAROCHO MORRIS AT [place]   • COLONOSCOPY N/A 06/18/2015    MULTIPLE SMALL AND LARGE SEVERE DIVERTICULA, EVIDENCE OF AN IMPACTED DIVERTICULUM WITHOUT BLEEDING, RESCOPE IN 5-10 YRS, DR. LIZBETH WRAY AT [facility]   • DILATATION AND CURETTAGE N/A 11/20/2007    REMOVAL OF CERVICAL POLYP, DR. SRAVANTHI CARRERO AT [facility]   • DILATATION AND CURETTAGE N/A 1981   • ENDOMETRIAL ABLATION N/A 1990   • HERNIA REPAIR  Jan 2016    with hysterectomy   • HYSTERECTOMY N/A 01/13/2016    LAPAROSCOPIC HYSTERECTOMY WITH BSO, DR. JAROCHO MORRIS AT [place]   • TUBAL ABDOMINAL LIGATION  Aug 1986   • UMBILICAL HERNIA REPAIR      repaired with hysterectomy 2016       Family History: family history includes Alcohol abuse in her brother, brother, brother, and brother; Atrial fibrillation in her sister; COPD in her father; Cancer in her father; Diabetes in her paternal grandmother; Hearing loss in her father; Heart attack in her father; Heart disease in her father; Hyperlipidemia in her mother; Hypertension in her mother; Lung cancer in her father; No Known Problems in her maternal grandfather, maternal grandmother, and paternal grandfather; Stroke in her mother; Throat cancer in her father. Otherwise pertinent FHx was reviewed and not pertinent to current issue.    Social History:   reports that she has never smoked. She has never used smokeless tobacco. She reports current alcohol use of about 1.0 standard drink of alcohol per week. She reports that she does not use drugs.    Home Medications:  Azelastine HCl, Biotin, Triamcinolone Acetonide, albuterol sulfate HFA, aspirin, budesonide, chlorthalidone, cholecalciferol, fexofenadine, fluticasone-salmeterol, hydrocortisone, methylPREDNISolone, multivitamin, omeprazole, and potassium chloride    Allergies:  Allergies   Allergen Reactions   • Sulfa Antibiotics Other (See Comments)     DECREASED WBC'S   • Penicillins Rash       Objective   Objective     Vitals:   Temp:  [98.4 °F (36.9 °C)-98.5 °F (36.9 °C)] 98.4 °F (36.9 °C)  Heart Rate:  [] 72  Resp:  [16] 16  BP: (159-168)/(74-97) 159/74  Physical Exam    Constitutional: Awake, alert, well developed and in no acute distress   Eyes: PERRLA, sclerae anicteric, no conjunctival injection   HENT: NCAT, mucous membranes moist   Neck: Supple, no thyromegaly, no lymphadenopathy, trachea midline   Respiratory: Clear to auscultation bilaterally, nonlabored respirations, no wheezing no stridor   Cardiovascular: RRR, S1 and S2, no murmurs, rubs, or gallops, palpable pedal pulses bilaterally   Gastrointestinal: Positive bowel sounds, soft, nontender, nondistended   Musculoskeletal: No bilateral ankle edema, no clubbing or cyanosis to extremities   Psychiatric: Appropriate affect, cooperative   Neurologic: Oriented x 3, strength symmetric in all extremities, Cranial Nerves grossly intact to confrontation, speech clear   Skin: No rashes     Result Review    Result Review:  I have personally reviewed the results from the time of this admission to 5/19/2022 19:15 EDT and agree with these findings:  [x]  Laboratory  []  Microbiology  []  Radiology  []  EKG/Telemetry   []  Cardiology/Vascular   []  Pathology  []  Old records  []  Other:  Most notable findings include:  Troponin<0.010, low sodium 134, chloride  97, BUN 24, BUN/creatinine ratio 26.1, lipase 54, WBC 8, and platelets 252    Assessment & Plan   Assessment / Plan     Brief Patient Summary:  Snow Anderson is a 72 y.o. female who was seen and evaluated the ED for chest pain.  Patient is being admitted to the observation unit for further evaluation and cardiology consult.    Active Hospital Problems:  Active Hospital Problems    Diagnosis    • Chest pain      Plan:   Chest pain  -Cardiology consult-see Dr. Head's note for further detail  -Stress test and echo in a.m.  -Initial troponin negative, trend  -EKG shows sinus rhythm and negative for ischemic changes  -Chest x-ray negative for acute findings  -Telemetry monitoring    Hypertension  -Chronic condition, continue home medications  -Vital signs per nursing    GERD  -Continue home medications    DVT prophylaxis:  Mechanical DVT prophylaxis orders are present.    CODE STATUS:    Code Status (Patient has no pulse and is not breathing): CPR (Attempt to Resuscitate)  Medical Interventions (Patient has pulse or is breathing): Full Support    Admission Status:  I believe this patient meets observation status.    I wore an face mask, eye protection, and gloves during this patient encounter. Patient also wearing a surgical mask. Hand hygeine performed before and after seeing the patient.    Electronically signed by JANESSA Perkins, 05/19/22, 7:15 PM EDT.

## 2022-05-19 NOTE — TELEPHONE ENCOUNTER
That sounds great, thank you for making me aware.  Please advise patient to follow-up with me after discharge.

## 2022-05-20 ENCOUNTER — TELEPHONE (OUTPATIENT)
Dept: FAMILY MEDICINE CLINIC | Facility: CLINIC | Age: 72
End: 2022-05-20

## 2022-05-20 ENCOUNTER — APPOINTMENT (OUTPATIENT)
Dept: CARDIOLOGY | Facility: HOSPITAL | Age: 72
End: 2022-05-20

## 2022-05-20 ENCOUNTER — READMISSION MANAGEMENT (OUTPATIENT)
Dept: CALL CENTER | Facility: HOSPITAL | Age: 72
End: 2022-05-20

## 2022-05-20 VITALS
RESPIRATION RATE: 16 BRPM | SYSTOLIC BLOOD PRESSURE: 119 MMHG | HEART RATE: 63 BPM | DIASTOLIC BLOOD PRESSURE: 69 MMHG | BODY MASS INDEX: 24.24 KG/M2 | HEIGHT: 64 IN | OXYGEN SATURATION: 99 % | TEMPERATURE: 97.5 F | WEIGHT: 142 LBS

## 2022-05-20 LAB
ANION GAP SERPL CALCULATED.3IONS-SCNC: 10.7 MMOL/L (ref 5–15)
BH CV ECHO MEAS - ACS: 1.88 CM
BH CV ECHO MEAS - AO MAX PG: 6.5 MMHG
BH CV ECHO MEAS - AO MEAN PG: 3.5 MMHG
BH CV ECHO MEAS - AO ROOT DIAM: 2.8 CM
BH CV ECHO MEAS - AO V2 MAX: 127.6 CM/SEC
BH CV ECHO MEAS - AO V2 VTI: 25 CM
BH CV ECHO MEAS - AVA(I,D): 2.8 CM2
BH CV ECHO MEAS - EDV(CUBED): 45.1 ML
BH CV ECHO MEAS - EDV(MOD-SP2): 31 ML
BH CV ECHO MEAS - EDV(MOD-SP4): 47 ML
BH CV ECHO MEAS - EF(MOD-BP): 71.9 %
BH CV ECHO MEAS - EF(MOD-SP2): 67.7 %
BH CV ECHO MEAS - EF(MOD-SP4): 76.6 %
BH CV ECHO MEAS - ESV(CUBED): 15.7 ML
BH CV ECHO MEAS - ESV(MOD-SP2): 10 ML
BH CV ECHO MEAS - ESV(MOD-SP4): 11 ML
BH CV ECHO MEAS - FS: 29.7 %
BH CV ECHO MEAS - IVS/LVPW: 0.94 CM
BH CV ECHO MEAS - IVSD: 0.8 CM
BH CV ECHO MEAS - LA DIMENSION: 3.2 CM
BH CV ECHO MEAS - LAT PEAK E' VEL: 7.2 CM/SEC
BH CV ECHO MEAS - LV DIASTOLIC VOL/BSA (35-75): 27.8 CM2
BH CV ECHO MEAS - LV MASS(C)D: 81.3 GRAMS
BH CV ECHO MEAS - LV MAX PG: 6.9 MMHG
BH CV ECHO MEAS - LV MEAN PG: 2.9 MMHG
BH CV ECHO MEAS - LV SYSTOLIC VOL/BSA (12-30): 6.5 CM2
BH CV ECHO MEAS - LV V1 MAX: 131.3 CM/SEC
BH CV ECHO MEAS - LV V1 VTI: 22.4 CM
BH CV ECHO MEAS - LVIDD: 3.6 CM
BH CV ECHO MEAS - LVIDS: 2.5 CM
BH CV ECHO MEAS - LVOT AREA: 3.2 CM2
BH CV ECHO MEAS - LVOT DIAM: 2.01 CM
BH CV ECHO MEAS - LVPWD: 0.86 CM
BH CV ECHO MEAS - MED PEAK E' VEL: 5 CM/SEC
BH CV ECHO MEAS - MV A MAX VEL: 97.7 CM/SEC
BH CV ECHO MEAS - MV DEC SLOPE: 164.3 CM/SEC2
BH CV ECHO MEAS - MV DEC TIME: 0.32 MSEC
BH CV ECHO MEAS - MV E MAX VEL: 51.4 CM/SEC
BH CV ECHO MEAS - MV E/A: 0.53
BH CV ECHO MEAS - MV MAX PG: 4 MMHG
BH CV ECHO MEAS - MV MEAN PG: 1.48 MMHG
BH CV ECHO MEAS - MV P1/2T: 125.8 MSEC
BH CV ECHO MEAS - MV V2 VTI: 26.4 CM
BH CV ECHO MEAS - MVA(P1/2T): 1.75 CM2
BH CV ECHO MEAS - MVA(VTI): 2.7 CM2
BH CV ECHO MEAS - PA ACC TIME: 0.13 SEC
BH CV ECHO MEAS - PA PR(ACCEL): 18.4 MMHG
BH CV ECHO MEAS - PA V2 MAX: 108.6 CM/SEC
BH CV ECHO MEAS - PI END-D VEL: 126.2 CM/SEC
BH CV ECHO MEAS - RAP SYSTOLE: 3 MMHG
BH CV ECHO MEAS - RV MAX PG: 2.43 MMHG
BH CV ECHO MEAS - RV V1 MAX: 78 CM/SEC
BH CV ECHO MEAS - RV V1 VTI: 13.7 CM
BH CV ECHO MEAS - RVSP: 29.3 MMHG
BH CV ECHO MEAS - SI(MOD-SP2): 12.4 ML/M2
BH CV ECHO MEAS - SI(MOD-SP4): 21.3 ML/M2
BH CV ECHO MEAS - SV(LVOT): 70.9 ML
BH CV ECHO MEAS - SV(MOD-SP2): 21 ML
BH CV ECHO MEAS - SV(MOD-SP4): 36 ML
BH CV ECHO MEAS - TAPSE (>1.6): 1.42 CM
BH CV ECHO MEAS - TR MAX PG: 26.3 MMHG
BH CV ECHO MEAS - TR MAX VEL: 256.5 CM/SEC
BH CV ECHO MEASUREMENTS AVERAGE E/E' RATIO: 8.43
BH CV STRESS BP STAGE 1: NORMAL
BH CV STRESS BP STAGE 2: NORMAL
BH CV STRESS DURATION MIN STAGE 1: 3
BH CV STRESS DURATION MIN STAGE 2: 2
BH CV STRESS DURATION SEC STAGE 1: 0
BH CV STRESS DURATION SEC STAGE 2: 5
BH CV STRESS ECHO POST STRESS EJECTION FRACTION EF: 71 %
BH CV STRESS GRADE STAGE 1: 10
BH CV STRESS GRADE STAGE 2: 12
BH CV STRESS HR STAGE 1: 120
BH CV STRESS HR STAGE 2: 130
BH CV STRESS METS STAGE 1: 5
BH CV STRESS METS STAGE 2: 7.5
BH CV STRESS PROTOCOL 1: NORMAL
BH CV STRESS RECOVERY BP: NORMAL MMHG
BH CV STRESS RECOVERY HR: 79 BPM
BH CV STRESS SPEED STAGE 1: 1.7
BH CV STRESS SPEED STAGE 2: 2.5
BH CV STRESS STAGE 1: 1
BH CV STRESS STAGE 2: 2
BH CV XLRA - RV BASE: 2.9 CM
BH CV XLRA - RV LENGTH: 5.6 CM
BH CV XLRA - RV MID: 2.6 CM
BH CV XLRA - TDI S': 14.8 CM/SEC
BUN SERPL-MCNC: 18 MG/DL (ref 8–23)
BUN/CREAT SERPL: 18.4 (ref 7–25)
CALCIUM SPEC-SCNC: 9.5 MG/DL (ref 8.6–10.5)
CHLORIDE SERPL-SCNC: 99 MMOL/L (ref 98–107)
CO2 SERPL-SCNC: 28.3 MMOL/L (ref 22–29)
CREAT SERPL-MCNC: 0.98 MG/DL (ref 0.57–1)
DEPRECATED RDW RBC AUTO: 40.5 FL (ref 37–54)
EGFRCR SERPLBLD CKD-EPI 2021: 61.1 ML/MIN/1.73
ERYTHROCYTE [DISTWIDTH] IN BLOOD BY AUTOMATED COUNT: 12.5 % (ref 12.3–15.4)
GLUCOSE SERPL-MCNC: 100 MG/DL (ref 65–99)
HCT VFR BLD AUTO: 39.5 % (ref 34–46.6)
HGB BLD-MCNC: 13.7 G/DL (ref 12–15.9)
LEFT ATRIUM VOLUME INDEX: 13.6 ML/M2
MAXIMAL PREDICTED HEART RATE: 147 BPM
MCH RBC QN AUTO: 31.1 PG (ref 26.6–33)
MCHC RBC AUTO-ENTMCNC: 34.7 G/DL (ref 31.5–35.7)
MCV RBC AUTO: 89.8 FL (ref 79–97)
PERCENT MAX PREDICTED HR: 95.24 %
PLATELET # BLD AUTO: 221 10*3/MM3 (ref 140–450)
PMV BLD AUTO: 9.5 FL (ref 6–12)
POTASSIUM SERPL-SCNC: 3.7 MMOL/L (ref 3.5–5.2)
QT INTERVAL: 380 MS
QT INTERVAL: 428 MS
RBC # BLD AUTO: 4.4 10*6/MM3 (ref 3.77–5.28)
SINUS: 2.8 CM
SODIUM SERPL-SCNC: 138 MMOL/L (ref 136–145)
STJ: 2.8 CM
STRESS BASELINE BP: NORMAL MMHG
STRESS BASELINE HR: 81 BPM
STRESS PERCENT HR: 112 %
STRESS POST ESTIMATED WORKLOAD: 7.1 METS
STRESS POST EXERCISE DUR MIN: 5 MIN
STRESS POST EXERCISE DUR SEC: 5 SEC
STRESS POST PEAK BP: NORMAL MMHG
STRESS POST PEAK HR: 140 BPM
STRESS TARGET HR: 125 BPM
TROPONIN T SERPL-MCNC: <0.01 NG/ML (ref 0–0.03)
WBC NRBC COR # BLD: 6.1 10*3/MM3 (ref 3.4–10.8)

## 2022-05-20 PROCEDURE — 93325 DOPPLER ECHO COLOR FLOW MAPG: CPT

## 2022-05-20 PROCEDURE — 93320 DOPPLER ECHO COMPLETE: CPT | Performed by: INTERNAL MEDICINE

## 2022-05-20 PROCEDURE — 80048 BASIC METABOLIC PNL TOTAL CA: CPT | Performed by: NURSE PRACTITIONER

## 2022-05-20 PROCEDURE — 93017 CV STRESS TEST TRACING ONLY: CPT

## 2022-05-20 PROCEDURE — 93016 CV STRESS TEST SUPVJ ONLY: CPT | Performed by: INTERNAL MEDICINE

## 2022-05-20 PROCEDURE — G0378 HOSPITAL OBSERVATION PER HR: HCPCS

## 2022-05-20 PROCEDURE — 93018 CV STRESS TEST I&R ONLY: CPT | Performed by: INTERNAL MEDICINE

## 2022-05-20 PROCEDURE — 93320 DOPPLER ECHO COMPLETE: CPT

## 2022-05-20 PROCEDURE — 93352 ADMIN ECG CONTRAST AGENT: CPT | Performed by: INTERNAL MEDICINE

## 2022-05-20 PROCEDURE — 93350 STRESS TTE ONLY: CPT | Performed by: INTERNAL MEDICINE

## 2022-05-20 PROCEDURE — 93010 ELECTROCARDIOGRAM REPORT: CPT | Performed by: INTERNAL MEDICINE

## 2022-05-20 PROCEDURE — 85027 COMPLETE CBC AUTOMATED: CPT | Performed by: NURSE PRACTITIONER

## 2022-05-20 PROCEDURE — 84484 ASSAY OF TROPONIN QUANT: CPT | Performed by: NURSE PRACTITIONER

## 2022-05-20 PROCEDURE — 99213 OFFICE O/P EST LOW 20 MIN: CPT | Performed by: INTERNAL MEDICINE

## 2022-05-20 PROCEDURE — 25010000002 PERFLUTREN (DEFINITY) 8.476 MG IN SODIUM CHLORIDE (PF) 0.9 % 10 ML INJECTION: Performed by: INTERNAL MEDICINE

## 2022-05-20 PROCEDURE — 93350 STRESS TTE ONLY: CPT

## 2022-05-20 PROCEDURE — 93325 DOPPLER ECHO COLOR FLOW MAPG: CPT | Performed by: INTERNAL MEDICINE

## 2022-05-20 PROCEDURE — 93005 ELECTROCARDIOGRAM TRACING: CPT | Performed by: NURSE PRACTITIONER

## 2022-05-20 RX ADMIN — CHLORTHALIDONE 25 MG: 25 TABLET ORAL at 10:21

## 2022-05-20 RX ADMIN — PERFLUTREN 5 ML: 6.52 INJECTION, SUSPENSION INTRAVENOUS at 09:38

## 2022-05-20 RX ADMIN — PANTOPRAZOLE SODIUM 40 MG: 40 TABLET, DELAYED RELEASE ORAL at 10:21

## 2022-05-20 RX ADMIN — POTASSIUM CHLORIDE 20 MEQ: 750 TABLET, EXTENDED RELEASE ORAL at 10:21

## 2022-05-20 RX ADMIN — Medication 10 ML: at 10:24

## 2022-05-20 RX ADMIN — ACETAMINOPHEN 650 MG: 325 TABLET ORAL at 10:20

## 2022-05-20 NOTE — PROGRESS NOTES
[facility]     Progress Note    Patient Name: Snow Anderson  : 1950  MRN: [id number]  Primary Care Physician:  Lilo Koehler MD  Date of admission: 2022    Subjective   Subjective     Chief Complaint: chest pain    History of Present Illness  Patient Reports that she had an episode of chest pain yesterday morning around 630.  She said that the pain radiated into her left chest neck and left arm.  She however denies any nausea vomiting shortness of breath or diaphoresis.    She has not had any further episodes of chest pain.    Review of Systems   Constitutional: Negative.    HENT: Negative.    Eyes: Negative.    Respiratory: Negative.  Negative for chest tightness and shortness of breath.    Cardiovascular: Negative for chest pain and leg swelling.   Gastrointestinal: Negative for nausea and vomiting.   Genitourinary: Negative.    Musculoskeletal: Negative.    Skin: Negative.    Neurological: Negative.  Negative for dizziness, weakness and light-headedness.   Psychiatric/Behavioral: Negative.        Objective   Objective     Vitals:   Temp:  [97.6 °F (36.4 °C)-98.5 °F (36.9 °C)] 97.88 °F (36.6 °C)  Heart Rate:  [] 84  Resp:  [16-18] 16  BP: (104-168)/(70-97) 104/80    Physical Exam  Vitals and nursing note reviewed.   Constitutional:       General: She is not in acute distress.     Appearance: Normal appearance.   HENT:      Head: Normocephalic.   Eyes:      Pupils: Pupils are equal, round, and reactive to light.   Cardiovascular:      Rate and Rhythm: Normal rate and regular rhythm.      Pulses: Normal pulses.      Heart sounds: Normal heart sounds.   Pulmonary:      Breath sounds: Normal breath sounds.   Abdominal:      General: Abdomen is flat. Bowel sounds are normal.      Palpations: Abdomen is soft.   Musculoskeletal:         General: Normal range of motion.      Cervical back: Normal range of motion and neck supple.   Skin:     General: Skin is warm.      Capillary Refill: Capillary  refill takes less than 2 seconds.   Neurological:      General: No focal deficit present.      Mental Status: She is alert and oriented to person, place, and time.   Psychiatric:         Mood and Affect: Mood normal.         Behavior: Behavior normal.          Result Review    Result Review:  I have personally reviewed the results from the time of this admission to 5/20/2022 10:18 EDT and agree with these findings:  [x]  Laboratory  [x]  Microbiology  [x]  Radiology  [x]  EKG/Telemetry   [x]  Cardiology/Vascular   []  Pathology  [x]  Old records  []  Other:  · Most notable findings include: Stress test Left ventricular ejection fraction appears to be 61 - 65%. Left ventricular systolic function is normal.  · Left ventricular diastolic function was normal.  · Patient exercised for 6 minutes per standard [place] protocol.  · No ECG evidence of myocardial ischemia. Negative clinical evidence of myocardial ischemia.  · Normal stress echo with no significant echocardiographic evidence for myocardial ischemia.      Assessment & Plan   Assessment / Plan     Brief Patient Summary:  Snow Anderson is a 72 y.o. female who was admitted to the observation unit from the emergency department after having an episode of chest pain yesterday morning.  She has been seen by cardiology and stress test has been done this morning.    Stress test has been completed and is negative.  Dr. Head has given the okay for the patient to go home.  Discussed with the patient and she is agreeable  Active Hospital Problems:  Active Hospital Problems    Diagnosis    • Chest pain      Plan:   Chest pain  -Cardiology consult-see Dr. Head's note for further detail  -Stress test- negative and echo-negative  -troponins negative  -EKG shows sinus rhythm and negative for ischemic changes  -Chest x-ray negative for acute findings  -Telemetry monitoring     Hypertension  -Chronic condition, continue home medications  -Vital signs per  nursing     GERD  -Continue home medications       DVT prophylaxis:  Mechanical DVT prophylaxis orders are present.    CODE STATUS:    Code Status (Patient has no pulse and is not breathing): CPR (Attempt to Resuscitate)  Medical Interventions (Patient has pulse or is breathing): Full Support    Disposition:  I expect patient to be discharged today.    This will also serve as a discharge summary    JANESSA Lincoln MD ATTESTATION NOTE    The JIAN and I have discussed this patient's history, physical exam, and treatment plan.  I have reviewed the documentation and personally had a face to face interaction with the patient. I affirm the documentation and agree with the treatment and plan.  My findings are described in a separate note.

## 2022-05-20 NOTE — NURSING NOTE
PT called out complaining of 6/10 chest pain radiating to her back.  EKG performed and reviewed by APRN.  One nitro given with relief.  PT stated her pain is now a 1/10.  Troponin recently drawn and sent to lab.  Vital signs stable.  Will continue to monitor.

## 2022-05-20 NOTE — PLAN OF CARE
Goal Outcome Evaluation: PT admitted with chest pain.  She experienced one episode earlier tonight that was relieved with one nitro.  EKG reviewed by APRN and a troponin was checked.  Vital signs remained stable throughout.  PT has remained NPO since midnight.  Will continue to monitor.

## 2022-05-20 NOTE — PROGRESS NOTES
MD ATTESTATION NOTE    The JIAN and I have discussed this patient's history, physical exam, and treatment plan.  I have reviewed the documentation and personally had a face to face interaction with the patient. I affirm the documentation and agree with the treatment and plan.  The attached note describes my personal findings.      I provided a substantive portion of the care of the patient.  I personally performed the physical exam in its entirety, and below are my findings.  For this patient encounter, the patient wore surgical mask, I wore full protective PPE including N95 and eye protection.      Brief HPI: Patient was admitted for evaluation of chest pain.  She had an episode of chest discomfort last night which resolved with nitroglycerin.  She currently denies chest pain, shortness of breath, nausea, vomiting, or sweating.    PHYSICAL EXAM  ED Triage Vitals   Temp Heart Rate Resp BP SpO2   05/19/22 1201 05/19/22 1201 05/19/22 1201 05/19/22 1225 05/19/22 1201   98.5 °F (36.9 °C) 111 16 168/97 95 %      Temp src Heart Rate Source Patient Position BP Location FiO2 (%)   05/19/22 1454 05/19/22 1454 05/19/22 1454 05/19/22 1454 --   Oral Monitor Lying Left arm          GENERAL: Awake, alert, oriented x3.  Well-developed, well-nourished female.  Resting comfortably in no acute distress  HENT: nares patent  EYES: no scleral icterus  CV: regular rhythm, normal rate, equal radial pulses bilaterally  RESPIRATORY: normal effort, clear to auscultation bilaterally  ABDOMEN: soft, nontender  MUSCULOSKELETAL: no deformity, no calf tenderness, no pedal edema  NEURO: alert, moves all extremities, follows commands  PSYCH:  calm, cooperative  SKIN: warm, dry    Vital signs and nursing notes reviewed.    EKG done at 4:30 AM.  My interpretation is as follows:   Sinus rhythm, rate 64.  Normal P/LAURA.  LAD.  LVH.  Anterior Q waves.  No ST elevation or depression.  EKG was not significantly changed compared to EKG done on  5/19/2022.    Plan: Serial troponins were negative.  Patient has been seen by cardiology and their input is appreciated.  Patient just returned from having a stress test and results are pending.  Anticipate discharge later today if stress echo is normal.

## 2022-05-20 NOTE — PROGRESS NOTES
"Patient Name: Snow Anderson  :1950  72 y.o.      Patient Care Team:  Lilo Koehler MD as PCP - General (Family Medicine)    Interval History:   Status post stress echo    Subjective:  Following for chest pain    Objective   Vital Signs  Temp:  [97.6 °F (36.4 °C)-98.5 °F (36.9 °C)] 97.88 °F (36.6 °C)  Heart Rate:  [] 73  Resp:  [16-18] 16  BP: (104-168)/(65-97) 133/65  No intake or output data in the 24 hours ending 22 1156  Flowsheet Rows    Flowsheet Row First Filed Value   Admission Height 162.6 cm (64.02\") Documented at 2022 1217   Admission Weight 64.4 kg (142 lb) Documented at 2022 1217          Physical Exam:   General Appearance:    Alert, cooperative, in no acute distress   Lungs:     Clear to auscultation.  Normal respiratory effort and rate.      Heart:    Regular rhythm and normal rate, normal S1 and S2, no murmurs, gallops or rubs.     Chest Wall:    No abnormalities observed   Abdomen:     Soft, nontender, positive bowel sounds.     Extremities:   no cyanosis, clubbing or edema.  No marked joint deformities.  Adequate musculoskeletal strength.       Results Review:    Results from last 7 days   Lab Units 22  0442   SODIUM mmol/L 138   POTASSIUM mmol/L 3.7   CHLORIDE mmol/L 99   CO2 mmol/L 28.3   BUN mg/dL 18   CREATININE mg/dL 0.98   GLUCOSE mg/dL 100*   CALCIUM mg/dL 9.5     Results from last 7 days   Lab Units 22  0442 22  1219   TROPONIN T ng/mL <0.010 <0.010 <0.010     Results from last 7 days   Lab Units 22  0442   WBC 10*3/mm3 6.10   HEMOGLOBIN g/dL 13.7   HEMATOCRIT % 39.5   PLATELETS 10*3/mm3 221                         Medication Review:   chlorthalidone, 25 mg, Oral, Daily  pantoprazole, 40 mg, Oral, QAM  potassium chloride, 20 mEq, Oral, Daily  sodium chloride, 10 mL, Intravenous, Q12H              Assessment & Plan     1.  Chest discomfort..  He ruled out for a myocardial infarction.  Stress echo normal.  I am okay " with discharge today.  I encouraged her to call the office if she has worsening chest pain or if it does not go away.  She did have some relief with nitroglycerin.  2.  Hyperlipidemia  3.  Hypertension  4.  Carotid atherosclerosis.    Nicole Head MD, [facility]  05/20/22  11:56 EDT

## 2022-05-20 NOTE — TELEPHONE ENCOUNTER
Pt called.  Stated she has labs drawn at the hospital and wanted to know if she still needs labs on Friday.  Please advise.     Pt requesting a call back.

## 2022-05-20 NOTE — DISCHARGE INSTRUCTIONS
Follow-up with your primary care physician in 1 week.  Follow up with Dr Head in 1 month. Take nitro as needed for chest pain.  Return to the emergency department for chest pain shortness of breath.

## 2022-05-20 NOTE — OUTREACH NOTE
Prep Survey    Flowsheet Row Responses   [facility] facility patient discharged from? [place]   Is LACE score < 7 ? Yes   Emergency Room discharge w/ pulse ox? No   Eligibility [facility]   Date of Admission 05/19/22   Date of Discharge 05/20/22   Discharge Disposition Home or Self Care   Discharge diagnosis Chest pain   Does the patient have one of the following disease processes/diagnoses(primary or secondary)? Other   Does the patient have Home health ordered? No   Is there a DME ordered? No   Prep survey completed? Yes          SABINO LIANG - Registered Nurse

## 2022-05-23 ENCOUNTER — OFFICE VISIT (OUTPATIENT)
Dept: FAMILY MEDICINE CLINIC | Facility: CLINIC | Age: 72
End: 2022-05-23

## 2022-05-23 ENCOUNTER — TRANSITIONAL CARE MANAGEMENT TELEPHONE ENCOUNTER (OUTPATIENT)
Dept: CALL CENTER | Facility: HOSPITAL | Age: 72
End: 2022-05-23

## 2022-05-23 VITALS
DIASTOLIC BLOOD PRESSURE: 68 MMHG | HEART RATE: 73 BPM | SYSTOLIC BLOOD PRESSURE: 132 MMHG | WEIGHT: 145 LBS | OXYGEN SATURATION: 98 % | TEMPERATURE: 97.8 F | HEIGHT: 64 IN | BODY MASS INDEX: 24.75 KG/M2

## 2022-05-23 DIAGNOSIS — R07.89 OTHER CHEST PAIN: ICD-10-CM

## 2022-05-23 DIAGNOSIS — Z09 HOSPITAL DISCHARGE FOLLOW-UP: Primary | ICD-10-CM

## 2022-05-23 DIAGNOSIS — K22.4 ESOPHAGEAL SPASM: ICD-10-CM

## 2022-05-23 DIAGNOSIS — K59.03 DRUG-INDUCED CONSTIPATION: ICD-10-CM

## 2022-05-23 DIAGNOSIS — K21.9 GASTROESOPHAGEAL REFLUX DISEASE, UNSPECIFIED WHETHER ESOPHAGITIS PRESENT: ICD-10-CM

## 2022-05-23 PROCEDURE — 99495 TRANSJ CARE MGMT MOD F2F 14D: CPT | Performed by: FAMILY MEDICINE

## 2022-05-23 PROCEDURE — 1111F DSCHRG MED/CURRENT MED MERGE: CPT | Performed by: FAMILY MEDICINE

## 2022-05-23 RX ORDER — PANTOPRAZOLE SODIUM 20 MG/1
20 TABLET, DELAYED RELEASE ORAL DAILY
Qty: 30 TABLET | Refills: 2 | Status: SHIPPED | OUTPATIENT
Start: 2022-05-23 | End: 2022-07-06

## 2022-05-23 RX ORDER — EPINEPHRINE 0.15 MG/.3ML
INJECTION INTRAMUSCULAR
COMMUNITY
Start: 2022-05-10

## 2022-05-23 RX ORDER — IPRATROPIUM BROMIDE AND ALBUTEROL SULFATE 2.5; .5 MG/3ML; MG/3ML
SOLUTION RESPIRATORY (INHALATION)
COMMUNITY
Start: 2022-05-02

## 2022-05-23 NOTE — PROGRESS NOTES
Transitional Care Follow Up Visit  Subjective     Snow Anderson is a 72 y.o. female who presents for a transitional care management visit.    Within 48 business hours after discharge our office contacted her via telephone to coordinate her care and needs.      I reviewed and discussed the details of that call along with the discharge summary, hospital problems, inpatient lab results, inpatient diagnostic studies, and consultation reports with Snow.     Current outpatient and discharge medications have been reconciled for the patient.  Reviewed by: Lilo Koehler MD      Date of TCM Phone Call 5/20/2022   Psychiatric   Date of Admission 5/19/2022   Date of Discharge 5/20/2022   Discharge Disposition Home or Self Care     Risk for Readmission (LACE) Score: 2 (5/20/2022  6:02 AM)      History of Present Illness   Course During Hospital Stay: Patient admitted to [facility] on May 19, 2022 and discharged May 19,020..  She presented to the emergency room with symptoms of chest pain, she had a correlating symptoms with heaviness, tightness in her chest that radiated to her left neck shoulder and arm.  She had no nausea vomiting diaphoresis or dyspnea.  She did have improvement with Tums.  She was evaluated by cardiology, stress test and echo performed when in the hospital.  Serial cardiac labs with troponins were negative, EKG negative for ischemic changes.  Chest x-ray negative for acute findings.  Hypertension was controlled.    Stress echo shows EF of 61 to 65%, left ventricular diastolic function is normal.  The stress portion was also reassuring, normal sinus rhythm.  Cardiac valves were overall normal, stress test portion was reassuring with no ST changes.  Negative for evidence of myocardial ischemia.    Thankfully her symptoms have since resolved.  Thought to be likely esophageal spasm.  She has not had an EGD and some years.  She has been on omeprazole 20 mg daily with no adverse  effects.     The following portions of the patient's history were reviewed and updated as appropriate: allergies, current medications, past family history, past medical history, past social history, past surgical history and problem list.    Review of Systems    Objective   Physical Exam  Vitals and nursing note reviewed.   Constitutional:       General: She is not in acute distress.     Appearance: She is well-developed.   HENT:      Head: Normocephalic.      Nose: Nose normal.   Cardiovascular:      Rate and Rhythm: Normal rate and regular rhythm.      Heart sounds: Normal heart sounds. No murmur heard.  Pulmonary:      Effort: Pulmonary effort is normal. No respiratory distress.      Breath sounds: Normal breath sounds.   Musculoskeletal:         General: Normal range of motion.   Skin:     General: Skin is warm and dry.      Findings: No rash.   Neurological:      Mental Status: She is alert and oriented to person, place, and time.   Psychiatric:         Behavior: Behavior normal.         Thought Content: Thought content normal.         Judgment: Judgment normal.         Assessment & Plan   Diagnoses and all orders for this visit:    1. Hospital discharge follow-up (Primary)    2. Gastroesophageal reflux disease, unspecified whether esophagitis present  -     Ambulatory Referral to Gastroenterology  -     pantoprazole (Protonix) 20 MG EC tablet; Take 1 tablet by mouth Daily.  Dispense: 30 tablet; Refill: 2    3. Esophageal spasm    4. Drug-induced constipation    5. Other chest pain    Pleasant 72-year-old female here for chronic GERD that is not well controlled.  It appears that this likely was the cause of her recent hospitalization for chest pain.  Thankfully her cardiac work-up is normal.  She does have follow-up with Dr. Head.  Referral to Dr. Angela Lam to consider EGD.  I changed her omeprazole to Protonix, gave a handout of lifestyle changes for reflux.  It does seem that garlic and onions are possible  triggers.  She did have some initial improvement of the chest pain with nitro, I will let her follow-up with Dr. Head to see if they want to continue the prescription or not.  Okay to restart exercise as she can tolerate.    Return in about 6 months (around 11/23/2022), or if symptoms worsen or fail to improve, for Annual Exam with fasting labs prior.    Medical assistant and I wore mask and eyewear protection during entire encounter.  Patient wore mask.    Lilo Koehler MD

## 2022-05-23 NOTE — OUTREACH NOTE
Call Center TCM Note    Flowsheet Row Responses   [facility] patient discharged from? [place]   Does the patient have one of the following disease processes/diagnoses(primary or secondary)? Other   TCM attempt successful? Yes   Call start time 1302   Call end time 1304   Discharge diagnosis Chest pain   Meds reviewed with patient/caregiver? Yes   Does the patient have all medications ordered at discharge? N/A   Is the patient taking all medications as directed (includes completed medication regime)? Yes   Does the patient have a primary care provider?  Yes   Comments regarding PCP PATIENT SAW DR. ALICIA THIS MORNING   Has the patient kept scheduled appointments due by today? Yes   Has home health visited the patient within 72 hours of discharge? N/A   Psychosocial issues? No   Did the patient receive a copy of their discharge instructions? Yes   Nursing interventions Reviewed instructions with patient   What is the patient's perception of their health status since discharge? Improving   Is the patient/caregiver able to teach back signs and symptoms related to disease process for when to call PCP? Yes   Is the patient/caregiver able to teach back signs and symptoms related to disease process for when to call 911? Yes   Is the patient/caregiver able to teach back the hierarchy of who to call/visit for symptoms/problems? PCP, Specialist, Home health nurse, Urgent Care, ED, 911 Yes   If the patient is a current smoker, are they able to teach back resources for cessation? Not a smoker   TCM call completed? Yes          Jolie Waters LPN    5/23/2022, 13:04 EDT

## 2022-05-23 NOTE — PROGRESS NOTES
"Chief Complaint  Chest Pain (Follow up  no complains ,feel soreness is back muscles  but is doing much better ), Transitional Care Management (Will follow up with cardiologist in a month ,doing better  has question about vaccines covid and pneumonia ), and GI Problem (After chest pain  they said probably esophageal spasm  would like to se gi doctor )    Subjective     {Problem List  Visit Diagnosis   Encounters  Notes  Medications  Labs  Result Review Imaging  Media :23}     Snow Anderson presents to [facility]  History of Present Illness    Objective   Vital Signs:  /68   Pulse 73   Temp 97.8 °F (36.6 °C)   Ht 162.6 cm (64\")   Wt 65.8 kg (145 lb)   SpO2 98%   BMI 24.89 kg/m²   BMI is within normal parameters. No other follow-up for BMI required.      Physical Exam   Result Review :{Labs  Result Review  Imaging  Med Tab  Media  Procedures :23}   {The following data was reviewed by (Optional):52740}  {Ambulatory Labs (Optional):62095}  {Data reviewed (Optional):67018:::1}          Assessment and Plan {CC Problem List  Visit Diagnosis   ROS  Review (Popup)  Health Maintenance  Quality  BestPractice  Medications  SmartSets  SnapShot Encounters  Media :23}   There are no diagnoses linked to this encounter.       {Time Spent (Optional):79384}  Follow Up {Instructions Charge Capture  Follow-up Communications :23}  No follow-ups on file.  Patient was given instructions and counseling regarding her condition or for health maintenance advice. Please see specific information pulled into the AVS if appropriate.       "

## 2022-05-24 ENCOUNTER — TELEPHONE (OUTPATIENT)
Dept: FAMILY MEDICINE CLINIC | Facility: CLINIC | Age: 72
End: 2022-05-24

## 2022-05-24 DIAGNOSIS — R30.0 DYSURIA: Primary | ICD-10-CM

## 2022-05-24 NOTE — TELEPHONE ENCOUNTER
I am okay if she drops off a sample to see if there is any evidence of UTI as we saw her yesterday know that she is otherwise stable.    Any decision about antibiotic will depend on UA / culture.

## 2022-05-24 NOTE — TELEPHONE ENCOUNTER
Caller: Snow Anderson    Relationship: Self    Best call back number: [phone]     What is the best time to reach you: ANYTIME     Who are you requesting to speak with (clinical staff, provider,  specific staff member): CLINICAL STAFF       What was the call regarding: PATIENT STATES SHE IS HAVING CRAMPING, BURNING AND BLEEDING WHEN SHE URINATES. PATIENT WOULD LIKE TO KNOW WHAT SHE SHOULD DO OR IF SHE NEEDS TO MAKE AN APPOINTMENT.     Do you require a callback: YES

## 2022-05-30 LAB
BACTERIA UR CULT: ABNORMAL
BACTERIA UR CULT: ABNORMAL
OTHER ANTIBIOTIC SUSC ISLT: ABNORMAL

## 2022-05-31 LAB — QT INTERVAL: 398 MS

## 2022-05-31 RX ORDER — NITROFURANTOIN 25; 75 MG/1; MG/1
100 CAPSULE ORAL 2 TIMES DAILY
Qty: 14 CAPSULE | Refills: 0 | Status: SHIPPED | OUTPATIENT
Start: 2022-05-31 | End: 2022-06-07

## 2022-05-31 NOTE — PROGRESS NOTES
I called patient back with results.  The labs has resulted as positive for UTI.  Sensitive for Macrobid.  Sent to pharmacy.  Please let us know if you have further questions.     Thank you

## 2022-06-15 ENCOUNTER — OFFICE VISIT (OUTPATIENT)
Dept: CARDIOLOGY | Facility: CLINIC | Age: 72
End: 2022-06-15

## 2022-06-15 VITALS
HEIGHT: 64 IN | HEART RATE: 70 BPM | WEIGHT: 144.6 LBS | SYSTOLIC BLOOD PRESSURE: 134 MMHG | BODY MASS INDEX: 24.69 KG/M2 | DIASTOLIC BLOOD PRESSURE: 76 MMHG | OXYGEN SATURATION: 97 %

## 2022-06-15 DIAGNOSIS — R07.89 CHEST PAIN, ATYPICAL: Primary | ICD-10-CM

## 2022-06-15 DIAGNOSIS — E78.49 OTHER HYPERLIPIDEMIA: ICD-10-CM

## 2022-06-15 PROCEDURE — 99213 OFFICE O/P EST LOW 20 MIN: CPT | Performed by: NURSE PRACTITIONER

## 2022-06-15 NOTE — PROGRESS NOTES
"    CARDIOLOGY        Patient Name: Snow Andesron  :1950  Age: 72 y.o.  Primary Cardiologist: Nicole Head MD  Encounter Provider:  JANESSA Brennan    Date of Service: 06/15/22          CHIEF COMPLAINT / REASON FOR OFFICE VISIT     Chest Pain ([facility] 1 month f/u/)      HISTORY OF PRESENT ILLNESS       HPI  Snow Anderson is a 72 y.o. female who presents today for reevaluation of observation admission.     Pt has a  history significant for mitral valve prolapse, hyperlipidemia.    Review of medical records reveals patient is admitted to the observation unit 2022 for chest discomfort.  She had stress echocardiogram which was unremarkable.    Patient states that she has done well since hospitalization.  She has not had any recurrent chest pain.  She states that she has not required any NTG.  She does have an appointment with a GI physician in the next month. She denies any episodes of chest pain, shortness of breath, palpitations, lightheadedness, swelling or fatigue.  She does exercise and denies limiting symptoms.     The following portions of the patient's history were reviewed and updated as appropriate: allergies, current medications, past family history, past medical history, past social history, past surgical history and problem list.      VITAL SIGNS     Visit Vitals  /76 (BP Location: Left arm, Patient Position: Sitting, Cuff Size: Adult)   Pulse 70   Ht 162.6 cm (64\")   Wt 65.6 kg (144 lb 9.6 oz)   SpO2 97%   BMI 24.82 kg/m²         Wt Readings from Last 3 Encounters:   06/15/22 65.6 kg (144 lb 9.6 oz)   22 65.8 kg (145 lb)   22 64.4 kg (142 lb)     Body mass index is 24.82 kg/m².      REVIEW OF SYSTEMS   Review of Systems   Constitutional: Negative for chills, fever, weight gain and weight loss.   Cardiovascular: Negative for leg swelling.   Respiratory: Negative for cough, snoring and wheezing.    Hematologic/Lymphatic: Negative for bleeding problem. Does not " bruise/bleed easily.   Skin: Negative for color change.   Musculoskeletal: Negative for falls, joint pain and myalgias.   Gastrointestinal: Negative for melena.   Genitourinary: Negative for hematuria.   Neurological: Negative for excessive daytime sleepiness.   Psychiatric/Behavioral: Negative for depression. The patient is not nervous/anxious.            PHYSICAL EXAMINATION     Vitals and nursing note reviewed.   Constitutional:       Appearance: Normal appearance. Well-developed.   Eyes:      Conjunctiva/sclera: Conjunctivae normal.   Neck:      Vascular: No carotid bruit.   Pulmonary:      Breath sounds: Normal breath sounds.   Cardiovascular:      Normal rate. Regular rhythm. Normal S1 with normal intensity. Normal S2 with normal intensity.      Murmurs: There is no murmur.      No gallop. No click. No rub.   Edema:     Peripheral edema absent.   Musculoskeletal: Normal range of motion. Skin:     General: Skin is warm and dry.   Neurological:      Mental Status: Alert and oriented to person, place, and time.      GCS: GCS eye subscore is 4. GCS verbal subscore is 5. GCS motor subscore is 6.   Psychiatric:         Speech: Speech normal.         Behavior: Behavior normal.         Thought Content: Thought content normal.         Judgment: Judgment normal.           REVIEWED DATA     Procedures    Cardiac Procedures:  1. Stress echocardiogram 5/20/2022.  LVEF 61-65%.  No ECG evidence of myocardial ischemia.  Normal stress echo with no echocardiographic evidence of ischemia.    Lipid Panel    Lipid Panel 12/22/21   Total Cholesterol 175   Triglycerides 38   HDL Cholesterol 100   VLDL Cholesterol 9   LDL Cholesterol  66           BUN   Date Value Ref Range Status   05/20/2022 18 8 - 23 mg/dL Final     Creatinine   Date Value Ref Range Status   05/20/2022 0.98 0.57 - 1.00 mg/dL Final     Potassium   Date Value Ref Range Status   05/20/2022 3.7 3.5 - 5.2 mmol/L Final     Comment:     Slight hemolysis detected by  analyzer. Results may be affected.     ALT (SGPT)   Date Value Ref Range Status   05/19/2022 28 1 - 33 U/L Final     AST (SGOT)   Date Value Ref Range Status   05/19/2022 24 1 - 32 U/L Final           ASSESSMENT & PLAN      Diagnosis Plan   1. Chest pain, atypical     2. Other hyperlipidemia           SUMMARY/DISCUSSION  1. Atypical chest pain.  Patient with recent hospitalization for atypical chest pain.  Has not had any recurrent symptoms since that time.  She did schedule a follow-up with GI to see if there could have been a GI component.  She had negative stress echocardiogram during hospitalization.  Negative cardiac biomarkers and a negative ECG.  2. Hyperlipidemia.  Lipid panel reviewed as documented above.  LDL at goal.  3. Follow-up with Dr. Head in 6 months for routine assessment.  Sooner for any problems or complications.        MEDICATIONS         Discharge Medications          Accurate as of Jaja 15, 2022  9:46 AM. If you have any questions, ask your nurse or doctor.            Continue These Medications      Instructions Start Date   albuterol sulfate  (90 Base) MCG/ACT inhaler  Commonly known as: PROVENTIL HFA;VENTOLIN HFA;PROAIR HFA   Every 6 Hours PRN      aspirin 81 MG tablet   Oral, Weekly      Azelastine HCl 137 MCG/SPRAY solution   As Needed      Biotin 1 MG capsule   Daily      chlorthalidone 25 MG tablet  Commonly known as: HYGROTON   Take 1 tablet by mouth once daily      cholecalciferol 25 MCG (1000 UT) tablet  Commonly known as: VITAMIN D3   2,000 Units, Oral      EPINEPHrine 0.15 MG/0.3ML solution auto-injector injection  Commonly known as: EPIPEN JR   No dose, route, or frequency recorded.      fexofenadine 180 MG tablet  Commonly known as: ALLEGRA   No dose, route, or frequency recorded.      hydrocortisone 2.5 % cream   As Needed      ipratropium-albuterol 0.5-2.5 mg/3 ml nebulizer  Commonly known as: DUO-NEB   No dose, route, or frequency recorded.      multivitamin tablet  tablet  Commonly known as: THERAGRAN   Oral      pantoprazole 20 MG EC tablet  Commonly known as: Protonix   20 mg, Oral, Daily      potassium chloride 20 MEQ CR tablet  Commonly known as: K-DUR,KLOR-CON   Take 1 tablet by mouth once daily      Trelegy Ellipta 200-62.5-25 MCG/INH inhaler  Generic drug: Fluticasone-Umeclidin-Vilant   1 puff, Inhalation, Daily      Triamcinolone Acetonide 55 MCG/ACT nasal inhaler  Commonly known as: NASACORT   2 sprays, Nasal, Daily                 **Dragon Disclaimer:   Much of this encounter note is an electronic transcription/translation of spoken language to printed text. The electronic translation of spoken language may permit erroneous, or at times, nonsensical words or phrases to be inadvertently transcribed. Although I have reviewed the note for such errors, some may still exist.

## 2022-07-06 ENCOUNTER — TELEMEDICINE (OUTPATIENT)
Dept: FAMILY MEDICINE CLINIC | Facility: CLINIC | Age: 72
End: 2022-07-06

## 2022-07-06 VITALS — BODY MASS INDEX: 24.03 KG/M2 | TEMPERATURE: 99 F | WEIGHT: 140 LBS

## 2022-07-06 DIAGNOSIS — J45.41 MODERATE PERSISTENT ASTHMATIC BRONCHITIS WITH ACUTE EXACERBATION: Primary | ICD-10-CM

## 2022-07-06 PROCEDURE — 99214 OFFICE O/P EST MOD 30 MIN: CPT | Performed by: FAMILY MEDICINE

## 2022-07-06 RX ORDER — AZITHROMYCIN 250 MG/1
TABLET, FILM COATED ORAL
Qty: 6 TABLET | Refills: 0 | Status: SHIPPED | OUTPATIENT
Start: 2022-07-06 | End: 2022-07-13

## 2022-07-06 RX ORDER — OMEPRAZOLE 20 MG/1
20 CAPSULE, DELAYED RELEASE ORAL DAILY
COMMUNITY
End: 2022-07-21

## 2022-07-06 RX ORDER — PREDNISONE 20 MG/1
40 TABLET ORAL DAILY
Qty: 6 TABLET | Refills: 0 | Status: SHIPPED | OUTPATIENT
Start: 2022-07-06 | End: 2022-07-09

## 2022-07-06 NOTE — PROGRESS NOTES
Mode of Visit: Video  Location of patient: home  You have chosen to receive care through a telehealth visit.  Does the patient consent to use a video/audio connection for your medical care today? Yes  The visit included audio and video interaction. No technical issues occurred during this visit.         Subjective   Snow Anderson is a 72 y.o. female.     History of Present Illness     Video Visit was done today because of COVID-19.  patient has consented to receive care via Video Visit   Patient location ;home    CC; cough , congestion , chest tightness     Sx started 5 days ago as cough , congestion , feels cold with chest tightness   She has asthma, so she continue taking her INH with the Neb. She feels well for short peroid of time then her sx back . She is also taking tylenol for body ache with some help.  Yesterday she had bad cough with body ache   This am has chest tightness   Her  has same sx for the last 2 weeks , gets better  Denies Fever , but feels cold    Current Outpatient Medications on File Prior to Visit   Medication Sig Dispense Refill   • albuterol sulfate  (90 Base) MCG/ACT inhaler Every 6 (Six) Hours As Needed.     • aspirin 81 MG tablet Take  by mouth 1 (One) Time Per Week.     • Azelastine HCl 137 MCG/SPRAY solution As Needed.     • Biotin 1 MG capsule Daily.     • chlorthalidone (HYGROTON) 25 MG tablet Take 1 tablet by mouth once daily 90 tablet 0   • Cholecalciferol (VITAMIN D) 1000 UNITS tablet Take 2,000 Units by mouth.     • EPINEPHrine (EPIPEN JR) 0.15 MG/0.3ML solution auto-injector injection      • fexofenadine (ALLEGRA) 180 MG tablet      • hydrocortisone 2.5 % cream As Needed.     • ipratropium-albuterol (DUO-NEB) 0.5-2.5 mg/3 ml nebulizer      • Multiple Vitamin (MULTI VITAMIN DAILY PO) Take  by mouth.     • omeprazole (priLOSEC) 20 MG capsule Take 20 mg by mouth Daily.     • potassium chloride (K-DUR,KLOR-CON) 20 MEQ CR tablet Take 1 tablet by mouth once daily 90  tablet 0   • Trelegy Ellipta 200-62.5-25 MCG/INH inhaler Inhale 1 puff Daily.     • Triamcinolone Acetonide (NASACORT) 55 MCG/ACT nasal inhaler 2 sprays into the nostril(s) as directed by provider Daily.     • [DISCONTINUED] pantoprazole (Protonix) 20 MG EC tablet Take 1 tablet by mouth Daily. 30 tablet 2     No current facility-administered medications on file prior to visit.       The following portions of the patient's history were reviewed and updated as appropriate: allergies, current medications, past family history, past medical history, past social history, past surgical history and problem list.    Review of Systems   Constitutional: Positive for appetite change and fatigue. Negative for activity change.        Feels cold   HENT: Positive for congestion, rhinorrhea and sinus pressure. Negative for swollen glands.    Respiratory: Positive for cough, chest tightness and wheezing.    Cardiovascular: Negative for chest pain, palpitations and leg swelling.       Objective   Temp 99 °F (37.2 °C)   Wt 63.5 kg (140 lb)   BMI 24.03 kg/m²   Physical Exam  Constitutional:       General: She is not in acute distress.     Appearance: She is not ill-appearing, toxic-appearing or diaphoretic.   Pulmonary:      Effort: No respiratory distress.   Neurological:      Mental Status: She is alert and oriented to person, place, and time.   Psychiatric:         Mood and Affect: Mood normal.         Behavior: Behavior normal.         Thought Content: Thought content normal.         Judgment: Judgment normal.           Assessment & Plan   Diagnoses and all orders for this visit:    1. Moderate persistent asthmatic bronchitis with acute exacerbation (Primary)  -     azithromycin (Zithromax) 250 MG tablet; Take 2 pills on D1 , then 1 pill every day  Dispense: 6 tablet; Refill: 0  -     predniSONE (DELTASONE) 20 MG tablet; Take 2 tablets by mouth Daily for 3 days.  Dispense: 6 tablet; Refill: 0  - Advised for supportive care as well  as scheduled for Nebu q 4-5 hrs for 3 days then PRN       I have reviewed the limitations of a telehealth visit (such as lack of a physical exam and unable to obtain vital signs) and advised the patient that they may need to follow up for an office visit should their symptoms or concerns persist, worsen, or change.  Patient was encouraged to keep me informed of any acute changes, lack of improvement, or any new concerning symptoms.   I discussed my findings,recommendations, and plan of care was with the patient. They verbalized understanding and agreement.

## 2022-07-12 ENCOUNTER — TELEPHONE (OUTPATIENT)
Dept: FAMILY MEDICINE CLINIC | Facility: CLINIC | Age: 72
End: 2022-07-12

## 2022-07-12 ENCOUNTER — OFFICE VISIT (OUTPATIENT)
Dept: GASTROENTEROLOGY | Facility: CLINIC | Age: 72
End: 2022-07-12

## 2022-07-12 VITALS
DIASTOLIC BLOOD PRESSURE: 78 MMHG | TEMPERATURE: 96.4 F | WEIGHT: 147.2 LBS | HEART RATE: 78 BPM | SYSTOLIC BLOOD PRESSURE: 127 MMHG | BODY MASS INDEX: 25.13 KG/M2 | HEIGHT: 64 IN

## 2022-07-12 DIAGNOSIS — R07.89 NON-CARDIAC CHEST PAIN: Primary | ICD-10-CM

## 2022-07-12 DIAGNOSIS — R05.3 CHRONIC COUGH: Chronic | ICD-10-CM

## 2022-07-12 DIAGNOSIS — R12 HEARTBURN: Chronic | ICD-10-CM

## 2022-07-12 PROCEDURE — 99204 OFFICE O/P NEW MOD 45 MIN: CPT | Performed by: INTERNAL MEDICINE

## 2022-07-12 RX ORDER — SODIUM CHLORIDE, SODIUM LACTATE, POTASSIUM CHLORIDE, CALCIUM CHLORIDE 600; 310; 30; 20 MG/100ML; MG/100ML; MG/100ML; MG/100ML
30 INJECTION, SOLUTION INTRAVENOUS CONTINUOUS
Status: CANCELLED | OUTPATIENT
Start: 2022-09-09

## 2022-07-12 RX ORDER — IPRATROPIUM BROMIDE 42 UG/1
SPRAY, METERED NASAL
COMMUNITY
Start: 2022-06-03

## 2022-07-12 NOTE — TELEPHONE ENCOUNTER
She saw dr morales for asthmatic   bronchitis  and was taking antibiotic and doing nebulizer treatments but cant ged rid of phlegm ,any advise ?

## 2022-07-12 NOTE — TELEPHONE ENCOUNTER
Patient having symptoms of shortness of breath?  She may need a second course of antibiotics?  It just depends if it is residual congestion that is taking time to improve or if the infection is not better.  If it is possible to make a follow-up appointment if not improving that would be helpful so we can assess if she needs another antibiotic or if it will improve more with time.  It probably be helpful for her to come to the office so we can listen to her.

## 2022-07-12 NOTE — TELEPHONE ENCOUNTER
PATIENT CALLED AND STATES SHE WAS SEEN VIA MY CHART BY DR. HOFF ON 7/6/22    SHE TOOK THE MEDICATION   azithromycin (Zithromax) 250 MG tablet  AND CURRENTL;Y USING  NEBULIZER EVERY 4 HOURS. SHE FINISHED STEROIDS AS WELL.    SHE HAS A WET COUGH. PHLEGM IS MOVING ABOUT BUT UNABLE TO GET RID OF IT.    [facility] [address] [place] [address] ([place]), [place] - [address] [facility] - [phone]  - [phone]   [phone]    CALL BACK NUMBER [phone]

## 2022-07-12 NOTE — PROGRESS NOTES
Chief Complaint   Patient presents with   • Heartburn   • Constipation   • Bloated       Subjective     HPI    Snow Anderson is a 72 y.o. female with a past medical history noted below who presents for heartburn, constipation, and bloating.  She has allergic asthma and her pcp was concerned there was a reflux component.  She was initially started on pepcid and then progressed to omeprazole.  She has had a cough since December and then was put on pantoprazole which flared her symptoms.  She is now back on omeprazole.  She says this keeps her from having heartburn though she denies that she had significant heartburn issues before she was put on medication    She has not had any workup for her GI issues.    In May she had an episode of chest pain, cardiac etiology was ruled out and this was thought to be an esophageal issue.      She is followed by an allergist for her cough and allergies.  She has never seen a pulmonologist.    Hx of hysterectomy    Never smoker.  Rare ETOH    She is retired, worked in labs.      No family hx of GI malignancies    Last colonoscopy was 2015 and notable for diverticulosis    She does take a daily stool softener to keep her regular.  She is not on a daily fiber supplement    Today's visit was in the office.  Both the patient and I were wearing face masks and proper hand hygiene was performed before and after the physical exam.           Current Outpatient Medications:   •  albuterol sulfate  (90 Base) MCG/ACT inhaler, Every 6 (Six) Hours As Needed., Disp: , Rfl:   •  aspirin 81 MG tablet, Take  by mouth 1 (One) Time Per Week., Disp: , Rfl:   •  Azelastine HCl 137 MCG/SPRAY solution, As Needed., Disp: , Rfl:   •  azithromycin (Zithromax) 250 MG tablet, Take 2 pills on D1 , then 1 pill every day, Disp: 6 tablet, Rfl: 0  •  Biotin 1 MG capsule, Daily., Disp: , Rfl:   •  chlorthalidone (HYGROTON) 25 MG tablet, Take 1 tablet by mouth once daily, Disp: 90 tablet, Rfl: 0  •   Cholecalciferol (VITAMIN D) 1000 UNITS tablet, Take 2,000 Units by mouth., Disp: , Rfl:   •  EPINEPHrine (EPIPEN JR) 0.15 MG/0.3ML solution auto-injector injection, , Disp: , Rfl:   •  fexofenadine (ALLEGRA) 180 MG tablet, , Disp: , Rfl:   •  hydrocortisone 2.5 % cream, As Needed., Disp: , Rfl:   •  ipratropium-albuterol (DUO-NEB) 0.5-2.5 mg/3 ml nebulizer, , Disp: , Rfl:   •  Multiple Vitamin (MULTI VITAMIN DAILY PO), Take  by mouth., Disp: , Rfl:   •  omeprazole (priLOSEC) 20 MG capsule, Take 20 mg by mouth Daily., Disp: , Rfl:   •  potassium chloride (K-DUR,KLOR-CON) 20 MEQ CR tablet, Take 1 tablet by mouth once daily, Disp: 90 tablet, Rfl: 0  •  Trelegy Ellipta 200-62.5-25 MCG/INH inhaler, Inhale 1 puff Daily., Disp: , Rfl:   •  Triamcinolone Acetonide (NASACORT) 55 MCG/ACT nasal inhaler, 2 sprays into the nostril(s) as directed by provider Daily., Disp: , Rfl:   •  ipratropium (ATROVENT) 0.06 % nasal spray, USE 2 SPRAY(S) IN EACH NOSTRIL 3 TO 4 TIMES DAILY, Disp: , Rfl:       Objective     Vitals:    07/12/22 1522   BP: 127/78   Pulse: 78   Temp: 96.4 °F (35.8 °C)         07/12/22  1522   Weight: 66.8 kg (147 lb 3.2 oz)     Body mass index is 25.27 kg/m².    Physical Exam  Constitutional:       General: She is not in acute distress.  Pulmonary:      Effort: Pulmonary effort is normal.      Comments: Frequent wet cough  Neurological:      Mental Status: She is alert and oriented to person, place, and time.   Psychiatric:         Mood and Affect: Mood normal.         Behavior: Behavior normal.         Thought Content: Thought content normal.         Judgment: Judgment normal.             WBC   Date Value Ref Range Status   05/20/2022 6.10 3.40 - 10.80 10*3/mm3 Final   12/22/2021 6.7 3.4 - 10.8 x10E3/uL Final     RBC   Date Value Ref Range Status   05/20/2022 4.40 3.77 - 5.28 10*6/mm3 Final   12/22/2021 4.18 3.77 - 5.28 x10E6/uL Final     Hemoglobin   Date Value Ref Range Status   05/20/2022 13.7 12.0 - 15.9 g/dL  Final     Hematocrit   Date Value Ref Range Status   05/20/2022 39.5 34.0 - 46.6 % Final     MCV   Date Value Ref Range Status   05/20/2022 89.8 79.0 - 97.0 fL Final     MCH   Date Value Ref Range Status   05/20/2022 31.1 26.6 - 33.0 pg Final     MCHC   Date Value Ref Range Status   05/20/2022 34.7 31.5 - 35.7 g/dL Final     RDW   Date Value Ref Range Status   05/20/2022 12.5 12.3 - 15.4 % Final     RDW-SD   Date Value Ref Range Status   05/20/2022 40.5 37.0 - 54.0 fl Final     MPV   Date Value Ref Range Status   05/20/2022 9.5 6.0 - 12.0 fL Final     Platelets   Date Value Ref Range Status   05/20/2022 221 140 - 450 10*3/mm3 Final     Neutrophil %   Date Value Ref Range Status   05/19/2022 71.4 42.7 - 76.0 % Final     Lymphocyte %   Date Value Ref Range Status   05/19/2022 14.9 (L) 19.6 - 45.3 % Final     Monocyte %   Date Value Ref Range Status   05/19/2022 9.5 5.0 - 12.0 % Final     Eosinophil %   Date Value Ref Range Status   05/19/2022 3.2 0.3 - 6.2 % Final     Basophil %   Date Value Ref Range Status   05/19/2022 0.4 0.0 - 1.5 % Final     Immature Grans %   Date Value Ref Range Status   05/19/2022 0.6 (H) 0.0 - 0.5 % Final     Neutrophils, Absolute   Date Value Ref Range Status   05/19/2022 5.88 1.70 - 7.00 10*3/mm3 Final     Lymphocytes, Absolute   Date Value Ref Range Status   05/19/2022 1.23 0.70 - 3.10 10*3/mm3 Final     Monocytes, Absolute   Date Value Ref Range Status   05/19/2022 0.78 0.10 - 0.90 10*3/mm3 Final     Eosinophils, Absolute   Date Value Ref Range Status   05/19/2022 0.26 0.00 - 0.40 10*3/mm3 Final     Basophils, Absolute   Date Value Ref Range Status   05/19/2022 0.03 0.00 - 0.20 10*3/mm3 Final     Immature Grans, Absolute   Date Value Ref Range Status   05/19/2022 0.05 0.00 - 0.05 10*3/mm3 Final     nRBC   Date Value Ref Range Status   05/19/2022 0.0 0.0 - 0.2 /100 WBC Final       Lab Results   Component Value Date    GLUCOSE 100 (H) 05/20/2022    BUN 18 05/20/2022    CREATININE 0.98  05/20/2022    EGFRIFNONA 56 (L) 02/10/2022    EGFRIFAFRI 64 02/10/2022    BCR 18.4 05/20/2022    CO2 28.3 05/20/2022    CALCIUM 9.5 05/20/2022    PROTENTOTREF 6.8 02/10/2022    ALBUMIN 3.90 05/19/2022    LABIL2 1.6 02/10/2022    AST 24 05/19/2022    ALT 28 05/19/2022     US LIVER-     CLINICAL: Elevated liver enzymes.     ULTRASOUND FINDINGS: Pancreas is normal in size, shape and echotexture.  The liver is normal in size, shape and echotexture. The gallbladder is  normal, no gallstones or wall thickening, nor biliary duct dilatation.  CBD is 5 mm.     The right kidney measures 10.3 cm in length and is normal in appearance.  No calculus, mass nor adenopathy seen. No free fluid within the right  upper quadrant.     CONCLUSION: Normal sonogram of the right upper quadrant.     This report was finalized on 2/22/2022 2:27 PM by Dr. Priyank Tyler M.D.    I personally reviewed data as detailed below:     The labs listed above.    The radiology studies listed above.    Office notes from: 6/15/22 cardiology note    Assessment and Plan  1.  Noncardiac chest pain: Potentially esophageal origin    2.  Heartburn: She has been on low-dose PPI    3.  Chronic cough: Unclear if this is related to above or another etiology.  She sounds quite productive in the office    Plan  Continue PPI  We will proceed with EGD for further evaluation of heartburn and noncardiac chest pain  Further recommendations after her endoscopy       Diagnoses and all orders for this visit:    1. Non-cardiac chest pain (Primary)  -     Case Request; Standing  -     COVID PRE-OP / PRE-PROCEDURE SCREENING ORDER (NO ISOLATION) - Swab, Nasopharynx; Future  -     Follow Anesthesia Guidelines / Standing Orders; Future  -     Obtain Informed Consent; Future  -     Case Request    2. Chronic cough  -     Case Request; Standing  -     COVID PRE-OP / PRE-PROCEDURE SCREENING ORDER (NO ISOLATION) - Swab, Nasopharynx; Future  -     Follow Anesthesia Guidelines / Standing  Orders; Future  -     Obtain Informed Consent; Future  -     Case Request    3. Heartburn  -     Case Request; Standing  -     COVID PRE-OP / PRE-PROCEDURE SCREENING ORDER (NO ISOLATION) - Swab, Nasopharynx; Future  -     Follow Anesthesia Guidelines / Standing Orders; Future  -     Obtain Informed Consent; Future  -     Case Request          I have discussed the above plan with the patient.  They verbalize understanding and are in agreement with the plan.  They have been advised to contact the office for any questions, concerns, or changes related to their health.    Dictated utilizing Dragon dictation

## 2022-07-13 ENCOUNTER — OFFICE VISIT (OUTPATIENT)
Dept: FAMILY MEDICINE CLINIC | Facility: CLINIC | Age: 72
End: 2022-07-13

## 2022-07-13 ENCOUNTER — TELEPHONE (OUTPATIENT)
Dept: GASTROENTEROLOGY | Facility: CLINIC | Age: 72
End: 2022-07-13

## 2022-07-13 VITALS
HEART RATE: 83 BPM | BODY MASS INDEX: 24.37 KG/M2 | RESPIRATION RATE: 16 BRPM | WEIGHT: 142 LBS | OXYGEN SATURATION: 98 % | DIASTOLIC BLOOD PRESSURE: 80 MMHG | TEMPERATURE: 97.3 F | SYSTOLIC BLOOD PRESSURE: 120 MMHG

## 2022-07-13 DIAGNOSIS — J22 LOWER RESPIRATORY INFECTION: ICD-10-CM

## 2022-07-13 DIAGNOSIS — J98.01 BRONCHOSPASM: ICD-10-CM

## 2022-07-13 DIAGNOSIS — R06.02 SOB (SHORTNESS OF BREATH): ICD-10-CM

## 2022-07-13 DIAGNOSIS — R05.9 COUGH: Primary | ICD-10-CM

## 2022-07-13 PROCEDURE — 99214 OFFICE O/P EST MOD 30 MIN: CPT | Performed by: FAMILY MEDICINE

## 2022-07-13 PROCEDURE — 71046 X-RAY EXAM CHEST 2 VIEWS: CPT | Performed by: FAMILY MEDICINE

## 2022-07-13 RX ORDER — LEVOFLOXACIN 500 MG/1
500 TABLET, FILM COATED ORAL DAILY
Qty: 7 TABLET | Refills: 0 | Status: SHIPPED | OUTPATIENT
Start: 2022-07-13 | End: 2022-09-19

## 2022-07-13 NOTE — PROGRESS NOTES
Subjective     Snow Anderson is a 72 y.o. female.     Chief Complaint   Patient presents with   • Cough     Still coughing, white mucus since Sunday.     • Fatigue       History of Present Illness     She is c/o cough for > 2 weeks with nasal and chest congestion , wheezing.   She has asthma , using her Inh and Breathing Rx with some help  She has been seen 1 week ago, started on z marisa and steroid pills, she felt little better but her cough is worse , was with colored sputum , now become more clear .   No F,C but she feels fatigue from the cough     The following portions of the patient's history were reviewed and updated as appropriate: allergies, current medications, past family history, past medical history, past social history, past surgical history and problem list.        Review of Systems   HENT: Positive for congestion, postnasal drip and rhinorrhea.    Respiratory: Positive for cough, shortness of breath and wheezing.        Vitals:    07/13/22 1343   BP: 120/80   Pulse: 83   Resp: 16   Temp: 97.3 °F (36.3 °C)   SpO2: 98%           07/13/22  1343   Weight: 64.4 kg (142 lb)         Body mass index is 24.37 kg/m².      Current Outpatient Medications   Medication Sig Dispense Refill   • albuterol sulfate  (90 Base) MCG/ACT inhaler Every 6 (Six) Hours As Needed.     • aspirin 81 MG tablet Take  by mouth 1 (One) Time Per Week.     • Azelastine HCl 137 MCG/SPRAY solution As Needed.     • Biotin 1 MG capsule Daily.     • chlorthalidone (HYGROTON) 25 MG tablet Take 1 tablet by mouth once daily 90 tablet 0   • Cholecalciferol (VITAMIN D) 1000 UNITS tablet Take 2,000 Units by mouth.     • EPINEPHrine (EPIPEN JR) 0.15 MG/0.3ML solution auto-injector injection      • fexofenadine (ALLEGRA) 180 MG tablet      • hydrocortisone 2.5 % cream As Needed.     • ipratropium (ATROVENT) 0.06 % nasal spray USE 2 SPRAY(S) IN EACH NOSTRIL 3 TO 4 TIMES DAILY     • ipratropium-albuterol (DUO-NEB) 0.5-2.5 mg/3 ml nebulizer       • Multiple Vitamin (MULTI VITAMIN DAILY PO) Take  by mouth.     • omeprazole (priLOSEC) 20 MG capsule Take 20 mg by mouth Daily.     • potassium chloride (K-DUR,KLOR-CON) 20 MEQ CR tablet Take 1 tablet by mouth once daily 90 tablet 0   • Trelegy Ellipta 200-62.5-25 MCG/INH inhaler Inhale 1 puff Daily.     • Triamcinolone Acetonide (NASACORT) 55 MCG/ACT nasal inhaler 2 sprays into the nostril(s) as directed by provider Daily.     • azithromycin (Zithromax) 250 MG tablet Take 2 pills on D1 , then 1 pill every day 6 tablet 0   • levoFLOXacin (Levaquin) 500 MG tablet Take 1 tablet by mouth Daily. 7 tablet 0     No current facility-administered medications for this visit.                Objective   Physical Exam  Vitals and nursing note reviewed.   Constitutional:       General: She is not in acute distress.     Appearance: She is not ill-appearing, toxic-appearing or diaphoretic.   HENT:      Nose: Congestion and rhinorrhea present.      Mouth/Throat:      Mouth: Mucous membranes are moist.      Pharynx: Posterior oropharyngeal erythema present. No oropharyngeal exudate.   Cardiovascular:      Rate and Rhythm: Normal rate and regular rhythm.      Heart sounds: Normal heart sounds. No murmur heard.    No friction rub. No gallop.   Pulmonary:      Effort: Pulmonary effort is normal. No respiratory distress.      Breath sounds: No stridor. Wheezing present. No rhonchi or rales.   Neurological:      Mental Status: She is alert and oriented to person, place, and time.   Psychiatric:         Mood and Affect: Mood normal.         Behavior: Behavior normal.         Thought Content: Thought content normal.         Judgment: Judgment normal.           Assessment & Plan   Diagnoses and all orders for this visit:    1. Cough (Primary)  -     XR Chest PA & Lateral (In Office)    2. SOB (shortness of breath)  -     XR Chest PA & Lateral (In Office)    3. Lower respiratory infection  -     levoFLOXacin (Levaquin) 500 MG tablet; Take  1 tablet by mouth Daily.  Dispense: 7 tablet; Refill: 0    4. Bronchospasm;  - Discussed supportive care   - Continue  Alb INH and other INH + breathing Rx      Discussed my prelim finding on XR which showed a lot of bilateral congestion, will call her with final radiology report      I was wearing N95 mask and eye protection during the entire duration of the visit.   Patient was masked the whole entire time.   Minimum social distance of 6 ft maintained through entire visit except for physical exam as documented.          I have fully discussed the nature of the medical condition(s) risks, complications, management, safe and proper use of medications.   I have discussed the SIDE EFFECT OF MEDICATION and importance TO report any side effect , the patient expressed good understanding.  Encouraged medication compliance and the importance of keeping scheduled follow up appointments with me and any other providers.    Patient instructed to follow up with our office for results on any labs/imaging ordered during this visit.    Home care discussed  All questions answered  Patient verbalizes understanding and agrees to treatment plan.     Follow up: Return for if no better or worsening symptoms.

## 2022-07-13 NOTE — TELEPHONE ENCOUNTER
FARZANEH patient via telephone for. Scheduled 9/9/22  with arrival time of 9:00 am . Prep paperwork mailed to verified address on file. Patient advised arrival time may change based on [facility] guidelines. FARZANEH POSADAS

## 2022-07-20 DIAGNOSIS — I10 ESSENTIAL HYPERTENSION: ICD-10-CM

## 2022-07-20 RX ORDER — POTASSIUM CHLORIDE 20 MEQ/1
TABLET, EXTENDED RELEASE ORAL
Qty: 90 TABLET | Refills: 0 | Status: SHIPPED | OUTPATIENT
Start: 2022-07-20 | End: 2022-11-15

## 2022-07-20 RX ORDER — CHLORTHALIDONE 25 MG/1
TABLET ORAL
Qty: 90 TABLET | Refills: 0 | Status: SHIPPED | OUTPATIENT
Start: 2022-07-20 | End: 2022-11-15

## 2022-07-20 NOTE — TELEPHONE ENCOUNTER
Rx Refill Note  Requested Prescriptions     Pending Prescriptions Disp Refills   • omeprazole (priLOSEC) 20 MG capsule [Pharmacy Med Name: Omeprazole 20 MG Oral Capsule Delayed Release] 90 capsule 0     Sig: Take 1 capsule by mouth once daily      Last office visit with prescribing clinician: 4/25/2022      Next office visit with prescribing clinician: Visit date not found            Tristin Kang MA  07/20/22, 11:12 EDT

## 2022-07-21 RX ORDER — OMEPRAZOLE 20 MG/1
CAPSULE, DELAYED RELEASE ORAL
Qty: 90 CAPSULE | Refills: 0 | Status: SHIPPED | OUTPATIENT
Start: 2022-07-21 | End: 2022-09-12

## 2022-07-28 ENCOUNTER — TRANSCRIBE ORDERS (OUTPATIENT)
Dept: ADMINISTRATIVE | Facility: HOSPITAL | Age: 72
End: 2022-07-28

## 2022-07-28 DIAGNOSIS — J41.8 MIXED SIMPLE AND MUCOPURULENT CHRONIC BRONCHITIS: Primary | ICD-10-CM

## 2022-08-11 ENCOUNTER — TELEPHONE (OUTPATIENT)
Dept: FAMILY MEDICINE CLINIC | Facility: CLINIC | Age: 72
End: 2022-08-11

## 2022-08-11 DIAGNOSIS — Z12.31 SCREENING MAMMOGRAM FOR BREAST CANCER: Primary | ICD-10-CM

## 2022-08-11 NOTE — TELEPHONE ENCOUNTER
Caller: Snow Anderson    Relationship: Self    Best call back number: [phone]    What is the medical concern/diagnosis: MASS ON RIGHT BREAST    What specialty or service is being requested: MAMMOGRAM

## 2022-08-12 NOTE — TELEPHONE ENCOUNTER
Has she had a mammogram yet?  It looks like her last mammogram was February 2021?  That may be the first step to do.

## 2022-08-18 ENCOUNTER — HOSPITAL ENCOUNTER (OUTPATIENT)
Dept: MAMMOGRAPHY | Facility: HOSPITAL | Age: 72
End: 2022-08-18

## 2022-08-19 ENCOUNTER — TRANSCRIBE ORDERS (OUTPATIENT)
Dept: ADMINISTRATIVE | Facility: HOSPITAL | Age: 72
End: 2022-08-19

## 2022-08-19 ENCOUNTER — TELEPHONE (OUTPATIENT)
Dept: FAMILY MEDICINE CLINIC | Facility: CLINIC | Age: 72
End: 2022-08-19

## 2022-08-19 DIAGNOSIS — R92.8 ABNORMAL FINDING ON BREAST IMAGING: Primary | ICD-10-CM

## 2022-08-19 DIAGNOSIS — R92.8 ABNORMAL FINDINGS ON DIAGNOSTIC IMAGING OF BREAST: Primary | ICD-10-CM

## 2022-08-19 NOTE — TELEPHONE ENCOUNTER
PATIENT CALLED AND STATES A SCREENING MAMMOGRAM WAS ORDERED BUT SHE NEEDS A DIAGNOSTIC MAMMOGRAM    PLEASE SEND TO [facility]    CALL BACK NUMBER [phone]

## 2022-09-08 ENCOUNTER — HOSPITAL ENCOUNTER (OUTPATIENT)
Dept: ULTRASOUND IMAGING | Facility: HOSPITAL | Age: 72
Discharge: HOME | End: 2022-09-08

## 2022-09-08 ENCOUNTER — HOSPITAL ENCOUNTER (OUTPATIENT)
Dept: MAMMOGRAPHY | Facility: HOSPITAL | Age: 72
Discharge: HOME | End: 2022-09-08

## 2022-09-08 DIAGNOSIS — R92.8 ABNORMAL FINDINGS ON DIAGNOSTIC IMAGING OF BREAST: ICD-10-CM

## 2022-09-08 DIAGNOSIS — R92.8 ABNORMAL FINDING ON BREAST IMAGING: ICD-10-CM

## 2022-09-08 PROCEDURE — 76642 ULTRASOUND BREAST LIMITED: CPT

## 2022-09-08 PROCEDURE — G0279 TOMOSYNTHESIS, MAMMO: HCPCS

## 2022-09-08 PROCEDURE — 77066 DX MAMMO INCL CAD BI: CPT

## 2022-09-09 ENCOUNTER — HOSPITAL ENCOUNTER (OUTPATIENT)
Facility: HOSPITAL | Age: 72
Setting detail: HOSPITAL OUTPATIENT SURGERY
Discharge: HOME | End: 2022-09-09
Attending: INTERNAL MEDICINE | Admitting: INTERNAL MEDICINE

## 2022-09-09 ENCOUNTER — ANESTHESIA EVENT (OUTPATIENT)
Dept: GASTROENTEROLOGY | Facility: HOSPITAL | Age: 72
End: 2022-09-09

## 2022-09-09 ENCOUNTER — ANESTHESIA (OUTPATIENT)
Dept: GASTROENTEROLOGY | Facility: HOSPITAL | Age: 72
End: 2022-09-09

## 2022-09-09 VITALS
OXYGEN SATURATION: 100 % | SYSTOLIC BLOOD PRESSURE: 107 MMHG | RESPIRATION RATE: 16 BRPM | DIASTOLIC BLOOD PRESSURE: 67 MMHG | BODY MASS INDEX: 24.24 KG/M2 | TEMPERATURE: 97.3 F | WEIGHT: 142 LBS | HEART RATE: 54 BPM | HEIGHT: 64 IN

## 2022-09-09 DIAGNOSIS — R05.3 CHRONIC COUGH: ICD-10-CM

## 2022-09-09 DIAGNOSIS — R12 HEARTBURN: ICD-10-CM

## 2022-09-09 DIAGNOSIS — R07.89 NON-CARDIAC CHEST PAIN: ICD-10-CM

## 2022-09-09 PROCEDURE — 88305 TISSUE EXAM BY PATHOLOGIST: CPT | Performed by: INTERNAL MEDICINE

## 2022-09-09 PROCEDURE — S0260 H&P FOR SURGERY: HCPCS | Performed by: INTERNAL MEDICINE

## 2022-09-09 PROCEDURE — 43239 EGD BIOPSY SINGLE/MULTIPLE: CPT | Performed by: INTERNAL MEDICINE

## 2022-09-09 PROCEDURE — 25010000002 PROPOFOL 10 MG/ML EMULSION: Performed by: ANESTHESIOLOGY

## 2022-09-09 RX ORDER — SODIUM CHLORIDE, SODIUM LACTATE, POTASSIUM CHLORIDE, CALCIUM CHLORIDE 600; 310; 30; 20 MG/100ML; MG/100ML; MG/100ML; MG/100ML
30 INJECTION, SOLUTION INTRAVENOUS CONTINUOUS
Status: DISCONTINUED | OUTPATIENT
Start: 2022-09-09 | End: 2022-09-09 | Stop reason: HOSPADM

## 2022-09-09 RX ORDER — LIDOCAINE HYDROCHLORIDE 20 MG/ML
INJECTION, SOLUTION INFILTRATION; PERINEURAL AS NEEDED
Status: DISCONTINUED | OUTPATIENT
Start: 2022-09-09 | End: 2022-09-09 | Stop reason: SURG

## 2022-09-09 RX ORDER — PROPOFOL 10 MG/ML
VIAL (ML) INTRAVENOUS CONTINUOUS PRN
Status: DISCONTINUED | OUTPATIENT
Start: 2022-09-09 | End: 2022-09-09 | Stop reason: SURG

## 2022-09-09 RX ORDER — PROPOFOL 10 MG/ML
VIAL (ML) INTRAVENOUS AS NEEDED
Status: DISCONTINUED | OUTPATIENT
Start: 2022-09-09 | End: 2022-09-09 | Stop reason: SURG

## 2022-09-09 RX ADMIN — SODIUM CHLORIDE, POTASSIUM CHLORIDE, SODIUM LACTATE AND CALCIUM CHLORIDE 30 ML/HR: 600; 310; 30; 20 INJECTION, SOLUTION INTRAVENOUS at 09:46

## 2022-09-09 RX ADMIN — LIDOCAINE HYDROCHLORIDE 30 MG: 20 INJECTION, SOLUTION INFILTRATION; PERINEURAL at 10:10

## 2022-09-09 RX ADMIN — PROPOFOL 120 MG: 10 INJECTION, EMULSION INTRAVENOUS at 10:11

## 2022-09-09 RX ADMIN — Medication 140 MCG/KG/MIN: at 10:15

## 2022-09-09 NOTE — H&P
[facility]  Pre Procedure History & Physical    Chief Complaint: Heartburn, noncardiac chest pain      HPI: 72 y.o. female with a past medical history noted below who presents for heartburn, constipation, and bloating.  She has allergic asthma and her pcp was concerned there was a reflux component.  She was initially started on pepcid and then progressed to omeprazole.  She has had a cough since December and then was put on pantoprazole which flared her symptoms.  She is now back on omeprazole.  She says this keeps her from having heartburn though she denies that she had significant heartburn issues before she was put on medication     She has not had any workup for her GI issues.     In May she had an episode of chest pain, cardiac etiology was ruled out and this was thought to be an esophageal issue.       She is followed by an allergist for her cough and allergies.  She has never seen a pulmonologist.     Hx of hysterectomy     Never smoker.  Rare ETOH     She is retired, worked in labs.       No family hx of GI malignancies     Last colonoscopy was 2015 and notable for diverticulosis       Past Medical History:   Past Medical History:   Diagnosis Date   • Abnormal kidney function    • Allergic 1970s    Penecillin; sulfa; sinus related allergies   • Allergic rhinitis    • Arthritis    • Asthma 09/2019    allergy induced   • Atherosclerosis of both carotid arteries    • Atrophic vaginitis    • Bell's palsy    • Cataract couple of years    noted at eye exam. very mild. no action needed at this time.   • Chronic kidney disease    • Cystitis 03/2018   • Cystocele with rectocele 01/2016   • Diverticulosis    • Dysuria    • Enterocele 01/2016   • GERD (gastroesophageal reflux disease) 2021    Mild. Taking pepsid. Controlled.   • Hypertension    • IFG (impaired fasting glucose)    • Low back pain 1980s    under control   • Mitral valve prolapse    • Multiple thyroid nodules 2017    BILATERAL   •  Osteoporosis    • Plantar fasciitis    • Plantar wart 10/2016    RIGHT FOOT   • PONV (postoperative nausea and vomiting)    • Seborrheic keratosis    • Sensorineural hearing loss     BILATERAL   • Umbilical hernia 2016   • Urge incontinence    • Vitamin D deficiency        Family History:  Family History   Problem Relation Age of Onset   • Stroke Mother         Massive CVA;    • Hypertension Mother    • Hyperlipidemia Mother    • COPD Father    • Lung cancer Father    • Hearing loss Father    • Throat cancer Father         Under the tongue    • Heart attack Father    • Cancer Father         lung cancer   • Heart disease Father    • Alcohol abuse Brother    • Atrial fibrillation Sister    • No Known Problems Maternal Grandmother    • No Known Problems Maternal Grandfather    • Diabetes Paternal Grandmother         in old age   • No Known Problems Paternal Grandfather    • Alcohol abuse Brother    • Alcohol abuse Brother            • Alcohol abuse Brother    • Breast cancer Neg Hx    • Colon cancer Neg Hx        Social History:   reports that she has never smoked. She has never used smokeless tobacco. She reports current alcohol use of about 1.0 standard drink of alcohol per week. She reports that she does not use drugs.    Medications:   No medications prior to admission.       Allergies:  Sulfa antibiotics and Penicillins    ROS:    Pertinent items are noted in HPI     Objective     There were no vitals taken for this visit.    Physical Exam   Constitutional: Pt is oriented to person, place, and time and well-developed, well-nourished, and in no distress.   HENT:   Mouth/Throat: Oropharynx is clear and moist.   Neck: Normal range of motion. Neck supple.   Cardiovascular: Normal rate, regular rhythm and normal heart sounds.    Pulmonary/Chest: Effort normal and breath sounds normal. No respiratory distress. No  wheezes.   Abdominal: Soft. Bowel sounds are normal.   Skin: Skin is warm and dry.    Psychiatric: Mood, memory, affect and judgment normal.     Assessment & Plan     Diagnosis: Heartburn, noncardiac chest pain      Anticipated Surgical Procedure:  EGD      The risks, benefits, and alternatives of this procedure have been discussed with the patient or the responsible party- the patient understands and agrees to proceed.

## 2022-09-09 NOTE — ANESTHESIA PREPROCEDURE EVALUATION
Anesthesia Evaluation     history of anesthetic complications: PONV               Airway   Mallampati: I  TM distance: >3 FB  Neck ROM: full  Dental - normal exam     Pulmonary    (+) asthma,  Cardiovascular     (+) hypertension, valvular problems/murmurs, angina (Stress test neg), hyperlipidemia,  carotid artery disease  (-) dysrhythmias      Neuro/Psych  GI/Hepatic/Renal/Endo    (+)  GERD,  renal disease CRI, thyroid problem hypothyroidism    Musculoskeletal     Abdominal    Substance History      OB/GYN          Other   arthritis,                    Anesthesia Plan    ASA 2     MAC     intravenous induction     Anesthetic plan, risks, benefits, and alternatives have been provided, discussed and informed consent has been obtained with: patient.        CODE STATUS:

## 2022-09-09 NOTE — ANESTHESIA POSTPROCEDURE EVALUATION
"Patient: Snow Anderson    Procedure Summary     Date: 09/09/22 Room / Location:  [facility]    Anesthesia Start: 1009 Anesthesia Stop: 1043    Procedure: ESOPHAGOGASTRODUODENOSCOPY WITH BIOPSIES (N/A Esophagus) Diagnosis:       Non-cardiac chest pain      Chronic cough      Heartburn      (Non-cardiac chest pain [R07.89])      (Chronic cough [R05.3])      (Heartburn [R12])    Surgeons: Angela Lam MD Provider: Yolis Adams MD    Anesthesia Type: MAC ASA Status: 2          Anesthesia Type: MAC    Vitals  Vitals Value Taken Time   /67 09/09/22 1101   Temp 36.3 °C (97.3 °F) 09/09/22 1036   Pulse 54 09/09/22 1101   Resp 16 09/09/22 1101   SpO2 100 % 09/09/22 1101           Post Anesthesia Care and Evaluation    Patient location during evaluation: PHASE II  Patient participation: complete - patient participated  Level of consciousness: awake and alert  Pain management: adequate    Airway patency: patent  Anesthetic complications: No anesthetic complications    Cardiovascular status: acceptable  Respiratory status: acceptable  Hydration status: acceptable    Comments: /67 (BP Location: Left arm, Patient Position: Lying)   Pulse 54   Temp 36.3 °C (97.3 °F) (Temporal)   Resp 16   Ht 162.6 cm (64\")   Wt 64.4 kg (142 lb)   SpO2 100%   BMI 24.37 kg/m²         "

## 2022-09-12 RX ORDER — OMEPRAZOLE 20 MG/1
CAPSULE, DELAYED RELEASE ORAL
Qty: 90 CAPSULE | Refills: 0 | Status: SHIPPED | OUTPATIENT
Start: 2022-09-12 | End: 2022-09-13 | Stop reason: SDUPTHER

## 2022-09-12 NOTE — TELEPHONE ENCOUNTER
Rx Refill Note  Requested Prescriptions     Pending Prescriptions Disp Refills   • omeprazole (priLOSEC) 20 MG capsule [Pharmacy Med Name: Omeprazole 20 MG Oral Capsule Delayed Release] 90 capsule 0     Sig: Take 1 capsule by mouth once daily      Last office visit with prescribing clinician: 4/25/2022      Next office visit with prescribing clinician: Visit date not found            Tristin Kang MA  09/12/22, 08:50 EDT

## 2022-09-13 LAB
LAB AP CASE REPORT: NORMAL
PATH REPORT.FINAL DX SPEC: NORMAL
PATH REPORT.GROSS SPEC: NORMAL

## 2022-09-13 RX ORDER — OMEPRAZOLE 20 MG/1
20 CAPSULE, DELAYED RELEASE ORAL
Qty: 90 CAPSULE | Refills: 3 | Status: SHIPPED | OUTPATIENT
Start: 2022-09-13

## 2022-09-13 NOTE — PROGRESS NOTES
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  There are no dysplastic changes.    She should continue daily omeprazole to prevent progression of the Owens's esophagus, I have sent refills to her pharmacy    Follow-up as scheduled in November.

## 2022-09-14 ENCOUNTER — TELEPHONE (OUTPATIENT)
Dept: GASTROENTEROLOGY | Facility: CLINIC | Age: 72
End: 2022-09-14

## 2022-09-14 NOTE — TELEPHONE ENCOUNTER
----- Message from Angela Lam MD sent at 9/13/2022 12:44 PM EDT -----  Biopsies from her EGD confirm Owens's esophagus.  There are no dysplastic changes.    She should continue daily omeprazole to prevent progression of the Owens's esophagus, I have sent refills to her pharmacy    Follow-up as scheduled in November.

## 2022-09-15 NOTE — TELEPHONE ENCOUNTER
Call to pt.  Advise per Dr Lam note.  Verb understanding.     EGD for 9/9/25 placed in recall and HM.

## 2022-09-19 ENCOUNTER — OFFICE VISIT (OUTPATIENT)
Dept: FAMILY MEDICINE CLINIC | Facility: CLINIC | Age: 72
End: 2022-09-19

## 2022-09-19 VITALS
WEIGHT: 144 LBS | BODY MASS INDEX: 24.59 KG/M2 | SYSTOLIC BLOOD PRESSURE: 126 MMHG | HEART RATE: 75 BPM | HEIGHT: 64 IN | TEMPERATURE: 98.7 F | DIASTOLIC BLOOD PRESSURE: 68 MMHG | OXYGEN SATURATION: 98 %

## 2022-09-19 DIAGNOSIS — E78.49 OTHER HYPERLIPIDEMIA: ICD-10-CM

## 2022-09-19 DIAGNOSIS — E04.2 MULTIPLE THYROID NODULES: ICD-10-CM

## 2022-09-19 DIAGNOSIS — I10 PRIMARY HYPERTENSION: ICD-10-CM

## 2022-09-19 DIAGNOSIS — E55.9 HYPOVITAMINOSIS D: ICD-10-CM

## 2022-09-19 DIAGNOSIS — R73.01 IMPAIRED FASTING GLUCOSE: ICD-10-CM

## 2022-09-19 DIAGNOSIS — K22.70 BARRETT'S ESOPHAGUS WITHOUT DYSPLASIA: Primary | ICD-10-CM

## 2022-09-19 DIAGNOSIS — J45.40 MODERATE PERSISTENT ASTHMA WITHOUT COMPLICATION: ICD-10-CM

## 2022-09-19 PROBLEM — Z91.09 ENVIRONMENTAL ALLERGIES: Status: ACTIVE | Noted: 2022-09-19

## 2022-09-19 PROBLEM — H26.9 CAPSULAR CATARACT OF RIGHT EYE: Status: ACTIVE | Noted: 2022-02-16

## 2022-09-19 PROCEDURE — 99214 OFFICE O/P EST MOD 30 MIN: CPT | Performed by: FAMILY MEDICINE

## 2022-09-19 NOTE — PROGRESS NOTES
Answers for HPI/ROS submitted by the patient on 9/12/2022  Please describe your symptoms.: Follow up with test and procedure results  Have you had these symptoms before?: Yes  How long have you been having these symptoms?: Greater than 2 weeks  Please list any medications you are currently taking for this condition.: Allergy meds and Omeprazole  Please describe any probable cause for these symptoms. : To discuss  What is the primary reason for your visit?: Other    Chief Complaint  Allergies (Cough and allergies follow up  doing much better had sinus clean up surgery and feeling much better ) and Hair/Scalp Problem ( Feels losing so much hair   and want it to mention it to dr nazario )    Subjective        Snow Anderson presents to [facility]  History of Present Illness  Pleasant 72-year-old female here to discuss cough that started in July, she was treated for pneumonia with a Z-Jatin and then Levaquin, prednisone and inhalers.  She has had improvement in her symptoms.  She is also had chest pain that she has had present for some time, had a follow-up with Dr. Lam recently, EGD performed.  She is doing much better overall.  She is able to identify her food triggers much more easily and avoid foods that make her symptoms worse.    She is also noticed some more hair loss recently.  Diffuse no patches.  No changes to lifestyle supplements or other medication changes, she is already starting to notice some hair growth back.    Allergies/asthma: Now on trilogy and over all improving.  had a sinoplasty on th L with good help.  She has been taking inhalers from Dr Sloan - or Dr Trinidad - they do ENT and allergy.  Giving her inhalers.  She is now on Trelegy, she was previously just on Breo but had her inhaler stepped up in December.  She seems to be doing much better on this.  She went out to recently during a time when ragweed had higher pollen counts and had increased wheezing and  "coughing and has had to use her inhalers and duo nebs more often.    Objective   Vital Signs:  /68   Pulse 75   Temp 98.7 °F (37.1 °C)   Ht 162.6 cm (64\")   Wt 65.3 kg (144 lb)   SpO2 98%   BMI 24.72 kg/m²   Estimated body mass index is 24.72 kg/m² as calculated from the following:    Height as of this encounter: 162.6 cm (64\").    Weight as of this encounter: 65.3 kg (144 lb).    BMI is within normal parameters. No other follow-up for BMI required.      Physical Exam  Vitals and nursing note reviewed.   Constitutional:       General: She is not in acute distress.     Appearance: She is well-developed.   HENT:      Head: Normocephalic.      Nose: Nose normal.   Cardiovascular:      Rate and Rhythm: Normal rate and regular rhythm.      Heart sounds: Normal heart sounds. No murmur heard.  Pulmonary:      Effort: Pulmonary effort is normal. No respiratory distress.      Breath sounds: Normal breath sounds.   Musculoskeletal:         General: Normal range of motion.   Skin:     General: Skin is warm and dry.      Findings: No rash.   Neurological:      Mental Status: She is alert and oriented to person, place, and time.   Psychiatric:         Behavior: Behavior normal.         Thought Content: Thought content normal.         Judgment: Judgment normal.        Result Review :                Assessment and Plan   Diagnoses and all orders for this visit:    1. Owens's esophagus without dysplasia (Primary)    2. Moderate persistent asthma without complication    3. Primary hypertension  -     Comprehensive Metabolic Panel; Future  -     CBC & Differential; Future    4. Other hyperlipidemia  -     Lipid Panel; Future    5. Impaired fasting glucose  -     Hemoglobin A1c; Future    6. Hypovitaminosis D  -     Vitamin D 25 Hydroxy; Future    7. Multiple thyroid nodules  -     TSH; Future  -     T4, free; Future    Pleasant 72-year-old female here to follow-up for new diagnosis of Owens's esophagus, she has been " doing much better with identifying food triggers, I do think this is the cause of her chest pain previously.  She has had a reassuring cardiac work-up.  Plan to continue with omeprazole 20, avoid food triggers and follow-up if not improving.  She will also continue to follow-up with GI to do EGDs every 3 years.    Mild persistent asthma, allergy induced, has regular follow-up with [facility], on Trelegy, she did have a recent upper respiratory infection with prolonged cough, responded well to prednisone, 2 courses of antibiotics initially azithromycin and second Levaquin.  She is now feeling back to normal.  She also had a sinuplasty on the left which seems to have opened her nostrils and she is able to breathe with much less discomfort.  She feels the best she has in some time.  Plan to continue same and do routine follow-up for annual wellness visit this December.  We will get records from her ENT.    Thyroid nodules have been stable, had recent ultrasound with her ENT, will get labs as above with next visit, also requested records.    We also discussed doing the pneumococcal 20 vaccine today, especially with her pulmonary disease I think this would be helpful but it was out of stock today.       Follow Up   Return in about 3 months (around 12/19/2022), or if symptoms worsen or fail to improve, for Annual Exam with fasting labs prior.  Patient was given instructions and counseling regarding her condition or for health maintenance advice. Please see specific information pulled into the AVS if appropriate. Medical assistant and I wore mask and eyewear protection during entire encounter.  Patient wore mask.    Lilo Koehler MD

## 2022-11-15 DIAGNOSIS — I10 ESSENTIAL HYPERTENSION: ICD-10-CM

## 2022-11-15 RX ORDER — CHLORTHALIDONE 25 MG/1
TABLET ORAL
Qty: 90 TABLET | Refills: 0 | Status: SHIPPED | OUTPATIENT
Start: 2022-11-15 | End: 2023-02-02

## 2022-11-15 RX ORDER — POTASSIUM CHLORIDE 20 MEQ/1
TABLET, EXTENDED RELEASE ORAL
Qty: 90 TABLET | Refills: 0 | Status: SHIPPED | OUTPATIENT
Start: 2022-11-15 | End: 2023-02-02

## 2022-11-16 ENCOUNTER — OFFICE VISIT (OUTPATIENT)
Dept: GASTROENTEROLOGY | Facility: CLINIC | Age: 72
End: 2022-11-16

## 2022-11-16 VITALS
TEMPERATURE: 96.4 F | HEIGHT: 64 IN | BODY MASS INDEX: 23.71 KG/M2 | HEART RATE: 80 BPM | SYSTOLIC BLOOD PRESSURE: 144 MMHG | WEIGHT: 138.9 LBS | DIASTOLIC BLOOD PRESSURE: 76 MMHG

## 2022-11-16 DIAGNOSIS — K22.70 BARRETT'S ESOPHAGUS WITHOUT DYSPLASIA: Primary | Chronic | ICD-10-CM

## 2022-11-16 DIAGNOSIS — R12 HEARTBURN: Chronic | ICD-10-CM

## 2022-11-16 PROCEDURE — 99214 OFFICE O/P EST MOD 30 MIN: CPT | Performed by: INTERNAL MEDICINE

## 2022-11-16 NOTE — PROGRESS NOTES
Chief Complaint   Patient presents with   • barretts esophagus       Subjective     HPI    Snow Anderson is a 72 y.o. female with a past medical history noted below who presents for follow up of heartburn and Owens's esophagus identified on her 9/9/22 EGD.  No dysplastic changes were identified.      Taking omeprazole and doing well from a heartburn standpoint    Does get bloating and discomfort with garlic, cruciferous veggies. Not aware of FODMAPS diet.    Tried FiberCon, gave her diarrhea and she felt it was related to a chemical within it so she stopped it.  Back to a daily stool softener and feels this is doing well.      Today's visit was in the office.  Both the patient and I were wearing face masks and proper hand hygiene was performed before and after the physical exam.           Current Outpatient Medications:   •  albuterol sulfate  (90 Base) MCG/ACT inhaler, Every 6 (Six) Hours As Needed., Disp: , Rfl:   •  Azelastine HCl 137 MCG/SPRAY solution, As Needed., Disp: , Rfl:   •  Biotin 1 MG capsule, Daily., Disp: , Rfl:   •  chlorthalidone (HYGROTON) 25 MG tablet, Take 1 tablet by mouth once daily, Disp: 90 tablet, Rfl: 0  •  Cholecalciferol (VITAMIN D) 1000 UNITS tablet, Take 2,000 Units by mouth., Disp: , Rfl:   •  EPINEPHrine (EPIPEN JR) 0.15 MG/0.3ML solution auto-injector injection, , Disp: , Rfl:   •  fexofenadine (ALLEGRA) 180 MG tablet, , Disp: , Rfl:   •  hydrocortisone 2.5 % cream, As Needed., Disp: , Rfl:   •  ipratropium (ATROVENT) 0.06 % nasal spray, USE 2 SPRAY(S) IN EACH NOSTRIL 3 TO 4 TIMES DAILY, Disp: , Rfl:   •  ipratropium-albuterol (DUO-NEB) 0.5-2.5 mg/3 ml nebulizer, , Disp: , Rfl:   •  Multiple Vitamin (MULTI VITAMIN DAILY PO), Take  by mouth., Disp: , Rfl:   •  omeprazole (priLOSEC) 20 MG capsule, Take 1 capsule by mouth Every Morning Before Breakfast., Disp: 90 capsule, Rfl: 3  •  potassium chloride (K-DUR,KLOR-CON) 20 MEQ CR tablet, TAKE 1  BY MOUTH ONCE DAILY, Disp: 90  tablet, Rfl: 0  •  Trelegy Ellipta 200-62.5-25 MCG/INH inhaler, Inhale 1 puff Daily., Disp: , Rfl:   •  Triamcinolone Acetonide (NASACORT) 55 MCG/ACT nasal inhaler, 2 sprays into the nostril(s) as directed by provider Daily., Disp: , Rfl:       Objective     Vitals:    11/16/22 1348   BP: 144/76   Pulse: 80   Temp: 96.4 °F (35.8 °C)         11/16/22  1348   Weight: 63 kg (138 lb 14.4 oz)     Body mass index is 23.84 kg/m².    Physical Exam        WBC   Date Value Ref Range Status   05/20/2022 6.10 3.40 - 10.80 10*3/mm3 Final   12/22/2021 6.7 3.4 - 10.8 x10E3/uL Final     RBC   Date Value Ref Range Status   05/20/2022 4.40 3.77 - 5.28 10*6/mm3 Final   12/22/2021 4.18 3.77 - 5.28 x10E6/uL Final     Hemoglobin   Date Value Ref Range Status   05/20/2022 13.7 12.0 - 15.9 g/dL Final     Hematocrit   Date Value Ref Range Status   05/20/2022 39.5 34.0 - 46.6 % Final     MCV   Date Value Ref Range Status   05/20/2022 89.8 79.0 - 97.0 fL Final     MCH   Date Value Ref Range Status   05/20/2022 31.1 26.6 - 33.0 pg Final     MCHC   Date Value Ref Range Status   05/20/2022 34.7 31.5 - 35.7 g/dL Final     RDW   Date Value Ref Range Status   05/20/2022 12.5 12.3 - 15.4 % Final     RDW-SD   Date Value Ref Range Status   05/20/2022 40.5 37.0 - 54.0 fl Final     MPV   Date Value Ref Range Status   05/20/2022 9.5 6.0 - 12.0 fL Final     Platelets   Date Value Ref Range Status   05/20/2022 221 140 - 450 10*3/mm3 Final     Neutrophil %   Date Value Ref Range Status   05/19/2022 71.4 42.7 - 76.0 % Final     Lymphocyte %   Date Value Ref Range Status   05/19/2022 14.9 (L) 19.6 - 45.3 % Final     Monocyte %   Date Value Ref Range Status   05/19/2022 9.5 5.0 - 12.0 % Final     Eosinophil %   Date Value Ref Range Status   05/19/2022 3.2 0.3 - 6.2 % Final     Basophil %   Date Value Ref Range Status   05/19/2022 0.4 0.0 - 1.5 % Final     Immature Grans %   Date Value Ref Range Status   05/19/2022 0.6 (H) 0.0 - 0.5 % Final     Neutrophils,  Absolute   Date Value Ref Range Status   05/19/2022 5.88 1.70 - 7.00 10*3/mm3 Final     Lymphocytes, Absolute   Date Value Ref Range Status   05/19/2022 1.23 0.70 - 3.10 10*3/mm3 Final     Monocytes, Absolute   Date Value Ref Range Status   05/19/2022 0.78 0.10 - 0.90 10*3/mm3 Final     Eosinophils, Absolute   Date Value Ref Range Status   05/19/2022 0.26 0.00 - 0.40 10*3/mm3 Final     Basophils, Absolute   Date Value Ref Range Status   05/19/2022 0.03 0.00 - 0.20 10*3/mm3 Final     Immature Grans, Absolute   Date Value Ref Range Status   05/19/2022 0.05 0.00 - 0.05 10*3/mm3 Final     nRBC   Date Value Ref Range Status   05/19/2022 0.0 0.0 - 0.2 /100 WBC Final       Lab Results   Component Value Date    GLUCOSE 100 (H) 05/20/2022    BUN 18 05/20/2022    CREATININE 0.98 05/20/2022    EGFRIFNONA 56 (L) 02/10/2022    EGFRIFAFRI 64 02/10/2022    BCR 18.4 05/20/2022    CO2 28.3 05/20/2022    CALCIUM 9.5 05/20/2022    PROTENTOTREF 6.8 02/10/2022    ALBUMIN 3.90 05/19/2022    LABIL2 1.6 02/10/2022    AST 24 05/19/2022    ALT 28 05/19/2022         Imaging Results (Last 7 Days)     ** No results found for the last 168 hours. **          I personally reviewed data as detailed below:     Office notes from: 9/19/22 pcp    Endoscopy procedures and pathology from: 9/9/22 EGD    Assessment and Plan         Diagnoses and all orders for this visit:    1. Owens's esophagus without dysplasia (Primary)    2. Heartburn    Plan  Continue PPI.  We discussed this is a long-term medication  We will plan for repeating EGD in 2025 along with her colonoscopy  Continue stool softener  Continue to monitor symptoms  Follow-up in 1 year, sooner if needed      I have discussed the above plan with the patient.  They verbalize understanding and are in agreement with the plan.  They have been advised to contact the office for any questions, concerns, or changes related to their health.    Dictated utilizing Dragon dictation

## 2022-12-29 ENCOUNTER — OFFICE VISIT (OUTPATIENT)
Dept: FAMILY MEDICINE CLINIC | Facility: CLINIC | Age: 72
End: 2022-12-29

## 2022-12-29 VITALS
WEIGHT: 134 LBS | OXYGEN SATURATION: 95 % | TEMPERATURE: 97.6 F | BODY MASS INDEX: 22.88 KG/M2 | HEART RATE: 78 BPM | DIASTOLIC BLOOD PRESSURE: 64 MMHG | HEIGHT: 64 IN | SYSTOLIC BLOOD PRESSURE: 130 MMHG

## 2022-12-29 DIAGNOSIS — J01.00 ACUTE NON-RECURRENT MAXILLARY SINUSITIS: ICD-10-CM

## 2022-12-29 DIAGNOSIS — Z00.00 MEDICARE ANNUAL WELLNESS VISIT, SUBSEQUENT: Primary | ICD-10-CM

## 2022-12-29 DIAGNOSIS — Z23 NEED FOR VACCINATION: ICD-10-CM

## 2022-12-29 DIAGNOSIS — E78.41 ELEVATED LIPOPROTEIN(A): ICD-10-CM

## 2022-12-29 PROCEDURE — 1159F MED LIST DOCD IN RCRD: CPT | Performed by: FAMILY MEDICINE

## 2022-12-29 PROCEDURE — G0439 PPPS, SUBSEQ VISIT: HCPCS | Performed by: FAMILY MEDICINE

## 2022-12-29 PROCEDURE — 90677 PCV20 VACCINE IM: CPT | Performed by: FAMILY MEDICINE

## 2022-12-29 PROCEDURE — 1126F AMNT PAIN NOTED NONE PRSNT: CPT | Performed by: FAMILY MEDICINE

## 2022-12-29 PROCEDURE — 1170F FXNL STATUS ASSESSED: CPT | Performed by: FAMILY MEDICINE

## 2022-12-29 PROCEDURE — 99214 OFFICE O/P EST MOD 30 MIN: CPT | Performed by: FAMILY MEDICINE

## 2022-12-29 PROCEDURE — G0009 ADMIN PNEUMOCOCCAL VACCINE: HCPCS | Performed by: FAMILY MEDICINE

## 2022-12-29 RX ORDER — DOXYCYCLINE HYCLATE 100 MG/1
100 CAPSULE ORAL 2 TIMES DAILY
Qty: 20 CAPSULE | Refills: 0 | Status: SHIPPED | OUTPATIENT
Start: 2022-12-29 | End: 2023-01-08

## 2022-12-29 RX ORDER — ROSUVASTATIN CALCIUM 5 MG/1
5 TABLET, COATED ORAL NIGHTLY
Qty: 30 TABLET | Refills: 2 | Status: SHIPPED | OUTPATIENT
Start: 2022-12-29 | End: 2023-03-10 | Stop reason: SDUPTHER

## 2022-12-29 NOTE — PROGRESS NOTES
The ABCs of the Annual Wellness Visit  Subsequent Medicare Wellness Visit    Subjective      Snow Anderson is a 72 y.o. female who presents for a Subsequent Medicare Wellness Visit.    The following portions of the patient's history were reviewed and   updated as appropriate: allergies, current medications, past family history, past medical history, past social history, past surgical history and problem list.    Compared to one year ago, the patient feels her physical   health is better.    Compared to one year ago, the patient feels her mental   health is the same.    Recent Hospitalizations:  This patient has had a [facility] admission record on file within the last 365 days.    Current Medical Providers:  Patient Care Team:  Lilo Koehler MD as PCP - General (Family Medicine)    Outpatient Medications Prior to Visit   Medication Sig Dispense Refill   • albuterol sulfate  (90 Base) MCG/ACT inhaler Every 6 (Six) Hours As Needed.     • Azelastine HCl 137 MCG/SPRAY solution As Needed.     • Biotin 1 MG capsule Daily.     • chlorthalidone (HYGROTON) 25 MG tablet Take 1 tablet by mouth once daily 90 tablet 0   • Cholecalciferol (VITAMIN D) 1000 UNITS tablet Take 2,000 Units by mouth.     • EPINEPHrine (EPIPEN JR) 0.15 MG/0.3ML solution auto-injector injection      • fexofenadine (ALLEGRA) 180 MG tablet      • hydrocortisone 2.5 % cream As Needed.     • ipratropium (ATROVENT) 0.06 % nasal spray USE 2 SPRAY(S) IN EACH NOSTRIL 3 TO 4 TIMES DAILY     • ipratropium-albuterol (DUO-NEB) 0.5-2.5 mg/3 ml nebulizer      • Multiple Vitamin (MULTI VITAMIN DAILY PO) Take  by mouth.     • omeprazole (priLOSEC) 20 MG capsule Take 1 capsule by mouth Every Morning Before Breakfast. 90 capsule 3   • potassium chloride (K-DUR,KLOR-CON) 20 MEQ CR tablet TAKE 1  BY MOUTH ONCE DAILY 90 tablet 0   • Trelegy Ellipta 200-62.5-25 MCG/INH inhaler Inhale 1 puff Daily.     • Triamcinolone Acetonide (NASACORT) 55 MCG/ACT nasal  "inhaler 2 sprays into the nostril(s) as directed by provider Daily.       No facility-administered medications prior to visit.       No opioid medication identified on active medication list. I have reviewed chart for other potential  high risk medication/s and harmful drug interactions in the elderly.          Aspirin is not on active medication list.  Aspirin use is not indicated based on review of current medical condition/s. Risk of harm outweighs potential benefits.  .    Patient Active Problem List   Diagnosis   • Atopic rhinitis   • Carotid atherosclerosis   • Hypertension   • Impaired fasting glucose   • Hypovitaminosis D   • Medicare annual wellness visit, subsequent   • Diverticulosis of large intestine without hemorrhage   • Multiple thyroid nodules   • Other hyperlipidemia   • Anxiety   • Nuclear cataract   • Allergies   • Non-cardiac chest pain   • Chronic cough   • Heartburn   • Owens's esophagus without dysplasia   • Capsular cataract of right eye   • Environmental allergies   • Moderate persistent asthma without complication     Advance Care Planning  Advance Directive is on file.  ACP discussion was held with the patient during this visit. Patient has an advance directive in EMR which is still valid.      Objective    Vitals:    12/29/22 0923   BP: 130/64   Pulse: 78   Temp: 97.6 °F (36.4 °C)   SpO2: 95%   Weight: 60.8 kg (134 lb)   Height: 162.6 cm (64\")   PainSc: 0-No pain     Estimated body mass index is 23 kg/m² as calculated from the following:    Height as of this encounter: 162.6 cm (64\").    Weight as of this encounter: 60.8 kg (134 lb).    BMI is within normal parameters. No other follow-up for BMI required.      Does the patient have evidence of cognitive impairment?   No  some times when she walks in a room and forgets why she came in the the room.    Lab Results   Component Value Date    CHLPL 208 (H) 12/19/2022    TRIG 48 12/19/2022    HDL 94 (H) 12/19/2022     (H) 12/19/2022 "    VLDL 9 12/19/2022    HGBA1C 5.50 12/19/2022          HEALTH RISK ASSESSMENT    Smoking Status:  Social History     Tobacco Use   Smoking Status Never   Smokeless Tobacco Never     Alcohol Consumption:  Social History     Substance and Sexual Activity   Alcohol Use Yes   • Alcohol/week: 1.0 standard drink   • Types: 1 Glasses of wine per week    Comment: rare     Fall Risk Screen:    NOELLE Fall Risk Assessment was completed, and patient is at LOW risk for falls.Assessment completed on:12/29/2022    Depression Screening:  PHQ-2/PHQ-9 Depression Screening 12/29/2022   Little Interest or Pleasure in Doing Things 0-->not at all   Feeling Down, Depressed or Hopeless 0-->not at all   PHQ-9: Brief Depression Severity Measure Score 0       Health Habits and Functional and Cognitive Screening:  Functional & Cognitive Status 12/29/2022   Do you have difficulty preparing food and eating? No   Do you have difficulty bathing yourself, getting dressed or grooming yourself? No   Do you have difficulty using the toilet? No   Do you have difficulty moving around from place to place? No   Do you have trouble with steps or getting out of a bed or a chair? No   Current Diet Well Balanced Diet   Dental Exam Up to date   Eye Exam Up to date   Exercise (times per week) 3 times per week   Current Exercises Include Walking   Current Exercise Activities Include -   Do you need help using the phone?  No   Are you deaf or do you have serious difficulty hearing?  No   Do you need help with transportation? No   Do you need help shopping? No   Do you need help preparing meals?  No   Do you need help with housework?  No   Do you need help with laundry? No   Do you need help taking your medications? No   Do you need help managing money? No   Do you ever drive or ride in a car without wearing a seat belt? No   Have you felt unusual stress, anger or loneliness in the last month? No   Who do you live with? Spouse   If you need help, do you have  trouble finding someone available to you? No   Have you been bothered in the last four weeks by sexual problems? No   Do you have difficulty concentrating, remembering or making decisions? No       Age-appropriate Screening Schedule:  Refer to the list below for future screening recommendations based on patient's age, sex and/or medical conditions. Orders for these recommended tests are listed in the plan section. The patient has been provided with a written plan.    Health Maintenance   Topic Date Due   • DXA SCAN  02/04/2023   • LIPID PANEL  12/19/2023   • MAMMOGRAM  09/08/2024   • TDAP/TD VACCINES (3 - Td or Tdap) 04/25/2030   • INFLUENZA VACCINE  Completed   • ZOSTER VACCINE  Completed                CMS Preventative Services Quick Reference  Risk Factors Identified During Encounter:    Immunizations Discussed/Encouraged: Prevnar 20 (Pneumococcal 20-valent conjugate)    The above risks/problems have been discussed with the patient.  Pertinent information has been shared with the patient in the After Visit Summary.    Diagnoses and all orders for this visit:    1. Medicare annual wellness visit, subsequent (Primary)    2. Elevated lipoprotein(a)  -     rosuvastatin (Crestor) 5 MG tablet; Take 1 tablet by mouth Every Night.  Dispense: 30 tablet; Refill: 2  -     Comprehensive Metabolic Panel; Future  -     Lipid Panel; Future    3. Acute non-recurrent maxillary sinusitis  -     doxycycline (VIBRAMYCIN) 100 MG capsule; Take 1 capsule by mouth 2 (Two) Times a Day for 10 days.  Dispense: 20 capsule; Refill: 0        The 10-year ASCVD risk score (Amor LAYNE, et al., 2019) is: 17.2%    Values used to calculate the score:      Age: 72 years      Sex: Female      Is Non- : No      Diabetic: No      Tobacco smoker: No      Systolic Blood Pressure: 130 mmHg      Is BP treated: Yes      HDL Cholesterol: 94 mg/dL      Total Cholesterol: 208 mg/dL      Follow Up:   Next Medicare Wellness visit to be  "scheduled in 1 year.      An After Visit Summary and PPPS were made available to the patient.      Chief Complaint  Sinus Problem (Sinus congestions for over 10 days has  now face pain and  sinus drainage ) and Medicare Wellness-subsequent (Issues with sinus infection now )    Subjective        Snow Anderson presents to [facility]  History of Present Illness   10 days of congestion, sinus pressure, sore throat, cough, she has use cold and fly otc meds, nasal wash, it got mostly improved and then last night with substantial worsening,  More drainage. She does have asthma and has increased her nebulizer treatments.     Hyperlipidemia not well controlled, she did take atorvastatin with symptoms of myalgia.  We discussed that her ASCVD is elevated at 17%, she is open to medication but wanted to do as much with lifestyle as possible, she has been using a pumpkin spice creamer in her coffee and has been recently eating more sweets.    Objective   Vital Signs:  /64   Pulse 78   Temp 97.6 °F (36.4 °C)   Ht 162.6 cm (64\")   Wt 60.8 kg (134 lb)   SpO2 95%   BMI 23.00 kg/m²   Estimated body mass index is 23 kg/m² as calculated from the following:    Height as of this encounter: 162.6 cm (64\").    Weight as of this encounter: 60.8 kg (134 lb).    BMI is within normal parameters. No other follow-up for BMI required.      Physical Exam  Vitals reviewed.   Constitutional:       General: She is not in acute distress.     Appearance: Normal appearance. She is well-developed. She is not diaphoretic.   HENT:      Head: Normocephalic and atraumatic. Hair is normal.      Right Ear: Hearing, tympanic membrane, ear canal and external ear normal. No decreased hearing noted. No drainage.      Left Ear: Hearing, tympanic membrane, ear canal and external ear normal. No decreased hearing noted.      Nose: Nose normal. No nasal deformity.      Mouth/Throat:      Mouth: Mucous membranes are moist. "   Eyes:      General: Lids are normal.         Right eye: No discharge.         Left eye: No discharge.      Extraocular Movements: Extraocular movements intact.      Conjunctiva/sclera: Conjunctivae normal.      Pupils: Pupils are equal, round, and reactive to light.   Neck:      Thyroid: No thyromegaly.      Vascular: No JVD.      Trachea: No tracheal deviation.   Cardiovascular:      Rate and Rhythm: Normal rate and regular rhythm.      Pulses: Normal pulses.      Heart sounds: Normal heart sounds. No murmur heard.    No friction rub. No gallop.   Pulmonary:      Effort: Pulmonary effort is normal. No respiratory distress.      Breath sounds: Normal breath sounds. No wheezing or rales.   Chest:      Chest wall: No tenderness.   Abdominal:      General: Bowel sounds are normal. There is no distension.      Palpations: Abdomen is soft. There is no mass.      Tenderness: There is no abdominal tenderness. There is no guarding or rebound.      Hernia: No hernia is present.   Musculoskeletal:         General: No tenderness or deformity. Normal range of motion.      Cervical back: Normal range of motion and neck supple.   Lymphadenopathy:      Cervical: No cervical adenopathy.   Skin:     General: Skin is warm and dry.      Findings: No erythema or rash.   Neurological:      Mental Status: She is alert and oriented to person, place, and time.      Cranial Nerves: No cranial nerve deficit.      Motor: No abnormal muscle tone.      Coordination: Coordination normal.      Deep Tendon Reflexes: Reflexes are normal and symmetric. Reflexes normal.   Psychiatric:         Mood and Affect: Mood normal.         Behavior: Behavior normal.         Thought Content: Thought content normal.         Judgment: Judgment normal.        Result Review :  The following data was reviewed by: Lilo Koehler MD on 12/29/2022:  CMP    CMP 5/19/22 5/20/22 12/19/22   Glucose 90 100 (A) 109 (A)   BUN 24 (A) 18 20   Creatinine 0.92 0.98 0.93   eGFR  65.9 61.1    Sodium 134 (A) 138 142   Potassium 3.7 3.7 3.6   Chloride 97 (A) 99 99   Calcium 9.5 9.5 9.6   Total Protein   6.7   Total Protein 6.8     Albumin 3.90  4.70   Globulin   2.0   Globulin 2.9     Total Bilirubin 0.5  0.6   Alkaline Phosphatase 96  97   AST (SGOT) 24  24   ALT (SGPT) 28  19   Albumin/Globulin Ratio 1.3     BUN/Creatinine Ratio 26.1 (A) 18.4 21.5   Anion Gap 9.7 10.7    (A) Abnormal value       Comments are available for some flowsheets but are not being displayed.           CBC    CBC 5/19/22 5/20/22 12/19/22   WBC 8.23 6.10 6.68   RBC 4.55 4.40 4.14   Hemoglobin 14.0 13.7 12.6   Hematocrit 42.8 39.5 38.0   MCV 94.1 89.8 91.8   MCH 30.8 31.1 30.4   MCHC 32.7 34.7 33.2   RDW 12.7 12.5 12.2 (A)   Platelets 252 221 275   (A) Abnormal value            Lipid Panel    Lipid Panel 12/19/22   Total Cholesterol 208 (A)   Triglycerides 48   HDL Cholesterol 94 (A)   VLDL Cholesterol 9   LDL Cholesterol  105 (A)   (A) Abnormal value       Comments are available for some flowsheets but are not being displayed.           TSH    TSH 12/19/22   TSH 2.810           A1C Last 3 Results    HGBA1C Last 3 Results 12/19/22   Hemoglobin A1C 5.50      Comments are available for some flowsheets but are not being displayed.                     Assessment and Plan   Diagnoses and all orders for this visit:    1. Medicare annual wellness visit, subsequent (Primary)    2. Elevated lipoprotein(a)  -     rosuvastatin (Crestor) 5 MG tablet; Take 1 tablet by mouth Every Night.  Dispense: 30 tablet; Refill: 2  -     Comprehensive Metabolic Panel; Future  -     Lipid Panel; Future    3. Acute non-recurrent maxillary sinusitis  -     doxycycline (VIBRAMYCIN) 100 MG capsule; Take 1 capsule by mouth 2 (Two) Times a Day for 10 days.  Dispense: 20 capsule; Refill: 0    Hyperlipidemia not well controlled, plan to initiate prescription of rosuvastatin, adverse effects to atorvastatin with myalgia.  -Follow-up labs only in 3  months  -Follow-up with me in the office in 6 months    Sinus infection, 10 days, penicillin and sulfa allergic, treat with doxycycline as above with high resistance to azithromycin, no asthma symptoms adherent with inhalers and her regular follow-up with ENT.  If not improving I would recommend that she see her ENT for further follow-up she did have a sinuplasty earlier this year    We did discuss that in her 6 months appointment she needs a CMP, CBC, lipids, TSH and hemoglobin A1c  -If she gets myalgias to rosuvastatin and we stopped this medication she would need to have these other labs added to the orders above.       Follow Up   Return in about 6 months (around 6/29/2023), or if symptoms worsen or fail to improve, for Recheck hypertension with labs prior, 3 months labs only.  Patient was given instructions and counseling regarding her condition or for health maintenance advice. Please see specific information pulled into the AVS if appropriate. Medical assistant and I wore mask and eyewear protection during entire encounter.  Patient wore mask.    Lilo Koehler MD

## 2023-01-13 ENCOUNTER — TELEPHONE (OUTPATIENT)
Dept: FAMILY MEDICINE CLINIC | Facility: CLINIC | Age: 73
End: 2023-01-13
Payer: MEDICARE

## 2023-01-13 ENCOUNTER — OFFICE VISIT (OUTPATIENT)
Dept: FAMILY MEDICINE CLINIC | Facility: CLINIC | Age: 73
End: 2023-01-13
Payer: MEDICARE

## 2023-01-13 VITALS
SYSTOLIC BLOOD PRESSURE: 134 MMHG | DIASTOLIC BLOOD PRESSURE: 82 MMHG | WEIGHT: 134 LBS | HEART RATE: 77 BPM | BODY MASS INDEX: 22.88 KG/M2 | HEIGHT: 64 IN | TEMPERATURE: 98.7 F | OXYGEN SATURATION: 96 %

## 2023-01-13 DIAGNOSIS — J32.4 CHRONIC PANSINUSITIS: Primary | ICD-10-CM

## 2023-01-13 PROCEDURE — 99213 OFFICE O/P EST LOW 20 MIN: CPT | Performed by: NURSE PRACTITIONER

## 2023-01-13 RX ORDER — PREDNISONE 20 MG/1
40 TABLET ORAL DAILY
Qty: 10 TABLET | Refills: 0 | Status: SHIPPED | OUTPATIENT
Start: 2023-01-13 | End: 2023-01-18

## 2023-01-13 NOTE — PROGRESS NOTES
"Chief Complaint  URI (Chest congestions for over 7 days had antibiotic but is not helping , sinus drainage  and allergies ar bad too )    Subjective        Snow Anderson presents to [facility]  History of Present Illness  Snow Anderson is a 72 y.o.  female who presents to clinic today with complaints of a cough.  Patient has a history of environmental allergies, asthma, and chronic sinusitis.  Patient was treated with doxycycline for 10 days in December by her PCP for sinusitis.  Patient follows with ENT routinely.  Patient states that the doxycycline cleared up the \"yellowness\" to her nasal congestion, but she continues to have rhinorrhea and postnasal drip.  Congestion mainly feels in her upper central chest and does not feel as if it has progressed into her lungs.  She denies fever or chills.  She has been using her prescribed medications and also using nasal washes.  She is sometimes coughing things up, but it is clear.  Cough is worse at night.  She continues to have sinus pressure and has been taking Sudafed.  Patient is also using her inhaler and nebulizer as needed.      Review of Systems   Constitutional: Negative for chills, fatigue and fever.   HENT: Positive for congestion, postnasal drip and rhinorrhea.    Respiratory: Positive for cough. Negative for shortness of breath.       Objective   Vital Signs:  /82   Pulse 77   Temp 98.7 °F (37.1 °C)   Ht 162.6 cm (64\")   Wt 60.8 kg (134 lb)   SpO2 96%   BMI 23.00 kg/m²   Estimated body mass index is 23 kg/m² as calculated from the following:    Height as of this encounter: 162.6 cm (64\").    Weight as of this encounter: 60.8 kg (134 lb).       BMI is within normal parameters. No other follow-up for BMI required.      Physical Exam  Vitals reviewed.   Constitutional:       General: She is not in acute distress.     Appearance: Normal appearance. She is not ill-appearing.   HENT:      Head: Normocephalic and " atraumatic.      Nose: Congestion and rhinorrhea present.      Mouth/Throat:      Pharynx: Posterior oropharyngeal erythema present. No oropharyngeal exudate.   Eyes:      General: No scleral icterus.        Right eye: No discharge.         Left eye: No discharge.      Extraocular Movements: Extraocular movements intact.   Pulmonary:      Effort: No respiratory distress.      Breath sounds: Normal breath sounds. No stridor. No wheezing or rhonchi.   Neurological:      General: No focal deficit present.      Mental Status: She is alert and oriented to person, place, and time.   Psychiatric:         Mood and Affect: Mood normal.         Behavior: Behavior normal.        Result Review :                   Assessment and Plan   Diagnoses and all orders for this visit:    1. Chronic pansinusitis (Primary)  -     predniSONE (DELTASONE) 20 MG tablet; Take 2 tablets by mouth Daily for 5 days.  Dispense: 10 tablet; Refill: 0      Patient's symptoms do not appear acutely infectious.  Thick nasal drainage she was previously experiencing has cleared with 10-day course of doxycycline.  Patient's lung sounds are clear on auscultation and oxygen saturation is stable from patient's baseline.  Will try prednisone. She has tolerated this well in the past.  Advised patient to call in 2 to 3 days if symptoms persist and will prescribe another round of antibiotics.  Would likely consider a cephalosporin or doxycycline.  Hesitant to prescribe Levaquin as she received this 3 months ago.  Advised patient to follow-up with ENT.  Patient verbalized understanding to plan of care.         Follow Up   Return if symptoms worsen or fail to improve.  Patient was given instructions and counseling regarding her condition or for health maintenance advice. Please see specific information pulled into the AVS if appropriate.     Electronically signed by JANESSA Godwin, 01/15/23, 7:30 PM EST.

## 2023-01-13 NOTE — TELEPHONE ENCOUNTER
Caller: Snow Anderson    Relationship: Self    Best call back number: [phone]    What medication are you requesting: STERIOD    What are your current symptoms: CHEST CONGESTION, COUGH    How long have you been experiencing symptoms: SINCE 12/29    Have you had these symptoms before:    [x] Yes  [] No    Have you been treated for these symptoms before:   [x] Yes  [] No    If a prescription is needed, what is your preferred pharmacy and phone number: [address] ([place]), [place] - [address] [facility] [phone] [facility] [phone]      Additional notes:

## 2023-01-16 ENCOUNTER — OFFICE VISIT (OUTPATIENT)
Dept: FAMILY MEDICINE CLINIC | Facility: CLINIC | Age: 73
End: 2023-01-16
Payer: MEDICARE

## 2023-01-16 VITALS
HEART RATE: 74 BPM | OXYGEN SATURATION: 98 % | BODY MASS INDEX: 22.53 KG/M2 | SYSTOLIC BLOOD PRESSURE: 138 MMHG | HEIGHT: 64 IN | DIASTOLIC BLOOD PRESSURE: 88 MMHG | TEMPERATURE: 98 F | WEIGHT: 132 LBS

## 2023-01-16 DIAGNOSIS — R19.7 DIARRHEA, UNSPECIFIED TYPE: Primary | ICD-10-CM

## 2023-01-16 PROCEDURE — 99213 OFFICE O/P EST LOW 20 MIN: CPT | Performed by: NURSE PRACTITIONER

## 2023-01-16 NOTE — PROGRESS NOTES
"Chief Complaint  Abdominal Cramping (Abd cramping started Friday pm. Pt states she is not having diarrhea, but is having runny stool. No nausea/vomiting. Pt has not yet started medrol pack. Feeling weak. Is tolerating crackers and jello. )    Masks/face shield/appropriate PPE were worn for the entirety of the visit by the patient, MA, and provider.     Subjective        Snow Anderson presents to [facility]  History of Present Illness  Snow Anderson is a 72 y.o. female who presents to the clinic today with complaints of abdominal cramping. Patient's symptoms started 3 nights ago.  Patient had friends over and ate vegetable soup, grilled cheese, and appetizers with meats like salami.  She was cleaning and noticed some stomach pain, then had a bowel movement that night.  After 24 hours of a liquid diet, patient did eat something.  She did not have nausea or vomiting, but still had diarrhea.  Patient's diarrhea has been improving.  2 days ago, patient was having diarrhea every hour, yesterday every couple hours, and today she has only had 1 episode of diarrhea.  She denies abdominal pain, nausea, or vomiting.  She denies fever or chills.  She does have a history of diverticulosis.    Review of Systems   Gastrointestinal: Positive for abdominal pain.   Neurological: Positive for weakness.       Objective   Vital Signs:  /88   Pulse 74   Temp 98 °F (36.7 °C)   Ht 162.6 cm (64\")   Wt 59.9 kg (132 lb)   SpO2 98%   BMI 22.66 kg/m²   Estimated body mass index is 22.66 kg/m² as calculated from the following:    Height as of this encounter: 162.6 cm (64\").    Weight as of this encounter: 59.9 kg (132 lb).       BMI is within normal parameters. No other follow-up for BMI required.      Physical Exam  Vitals reviewed.   Constitutional:       General: She is not in acute distress.     Appearance: Normal appearance. She is not ill-appearing, toxic-appearing or diaphoretic.   HENT:    "   Head: Normocephalic and atraumatic.   Abdominal:      General: Bowel sounds are normal. There is no distension.      Palpations: Abdomen is soft.      Tenderness: There is no abdominal tenderness. There is no guarding or rebound. Negative signs include Lundberg's sign and McBurney's sign.   Neurological:      General: No focal deficit present.      Mental Status: She is alert and oriented to person, place, and time.      Motor: No weakness.      Gait: Gait normal.   Psychiatric:         Mood and Affect: Mood normal.         Behavior: Behavior normal.        Result Review :                   Assessment and Plan   Diagnoses and all orders for this visit:    1. Diarrhea, unspecified type (Primary)  -     CBC & Differential  -     Comprehensive metabolic panel  -     Clostridioides difficile EIA - Stool, Per Rectum; Future      Patient presents today with complaints of diarrhea.  She does not have nausea, vomiting, fever, or chills.  On physical examination, patient does not have an acute abdomen and does not appear in acute distress.  Mild tenderness noted to soft palpation of right upper quadrant, but no issues or pain to deep palpation.  Patient does have a history of diverticulosis.  Recommended clear liquid or continue a bland diet.  It does seem as if her diarrhea has been improving.  Will check CBC and CMP today.  She has also recently been on multiple antibiotics for sinusitis, so we will evaluate for C. Difficile. Possible relation to the food she ate Friday?  Recommended patient increase fluid intake to help prevent dehydration.  Patient advised to call in 2 days if symptoms are not improving as I would advise a CT scan of the abdomen.  Patient was advised to go to the hospital for abdominal pain, vomiting, inability to tolerate oral intake.  Patient verbalized understanding.       Follow Up   Return in about 1 week (around 1/23/2023), or if symptoms worsen or fail to improve in 2 days, for Recheck.  Patient  was given instructions and counseling regarding her condition or for health maintenance advice. Please see specific information pulled into the AVS if appropriate.     Electronically signed by JANESSA Godwin, 01/16/23, 12:07 PM EST.

## 2023-01-17 LAB
ALBUMIN SERPL-MCNC: 4.4 G/DL (ref 3.5–5.2)
ALBUMIN/GLOB SERPL: 2.2 G/DL
ALP SERPL-CCNC: 96 U/L (ref 39–117)
ALT SERPL-CCNC: 18 U/L (ref 1–33)
AST SERPL-CCNC: 21 U/L (ref 1–32)
BASOPHILS # BLD AUTO: 0.03 10*3/MM3 (ref 0–0.2)
BASOPHILS NFR BLD AUTO: 0.4 % (ref 0–1.5)
BILIRUB SERPL-MCNC: 0.3 MG/DL (ref 0–1.2)
BUN SERPL-MCNC: 15 MG/DL (ref 8–23)
BUN/CREAT SERPL: 17 (ref 7–25)
CALCIUM SERPL-MCNC: 9.9 MG/DL (ref 8.6–10.5)
CHLORIDE SERPL-SCNC: 100 MMOL/L (ref 98–107)
CO2 SERPL-SCNC: 33.1 MMOL/L (ref 22–29)
CREAT SERPL-MCNC: 0.88 MG/DL (ref 0.57–1)
EGFRCR SERPLBLD CKD-EPI 2021: 69.5 ML/MIN/1.73
EOSINOPHIL # BLD AUTO: 0.11 10*3/MM3 (ref 0–0.4)
EOSINOPHIL NFR BLD AUTO: 1.5 % (ref 0.3–6.2)
ERYTHROCYTE [DISTWIDTH] IN BLOOD BY AUTOMATED COUNT: 12 % (ref 12.3–15.4)
GLOBULIN SER CALC-MCNC: 2 GM/DL
GLUCOSE SERPL-MCNC: 91 MG/DL (ref 65–99)
HCT VFR BLD AUTO: 38.7 % (ref 34–46.6)
HGB BLD-MCNC: 13.1 G/DL (ref 12–15.9)
IMM GRANULOCYTES # BLD AUTO: 0.02 10*3/MM3 (ref 0–0.05)
IMM GRANULOCYTES NFR BLD AUTO: 0.3 % (ref 0–0.5)
LYMPHOCYTES # BLD AUTO: 1.26 10*3/MM3 (ref 0.7–3.1)
LYMPHOCYTES NFR BLD AUTO: 17.5 % (ref 19.6–45.3)
MCH RBC QN AUTO: 29.9 PG (ref 26.6–33)
MCHC RBC AUTO-ENTMCNC: 33.9 G/DL (ref 31.5–35.7)
MCV RBC AUTO: 88.4 FL (ref 79–97)
MONOCYTES # BLD AUTO: 0.65 10*3/MM3 (ref 0.1–0.9)
MONOCYTES NFR BLD AUTO: 9 % (ref 5–12)
NEUTROPHILS # BLD AUTO: 5.14 10*3/MM3 (ref 1.7–7)
NEUTROPHILS NFR BLD AUTO: 71.3 % (ref 42.7–76)
NRBC BLD AUTO-RTO: 0 /100 WBC (ref 0–0.2)
PLATELET # BLD AUTO: 301 10*3/MM3 (ref 140–450)
POTASSIUM SERPL-SCNC: 4 MMOL/L (ref 3.5–5.2)
PROT SERPL-MCNC: 6.4 G/DL (ref 6–8.5)
RBC # BLD AUTO: 4.38 10*6/MM3 (ref 3.77–5.28)
SODIUM SERPL-SCNC: 143 MMOL/L (ref 136–145)
WBC # BLD AUTO: 7.21 10*3/MM3 (ref 3.4–10.8)

## 2023-01-20 NOTE — PROGRESS NOTES
"Chief Complaint  Diarrhea (1W fu. Resumed eating Wednesday - diarrhea resumed. Liquid diet Thursday and Friday. BRAT diet this weekend. Feeling improved, still feeling week. DID not take potassium or chlorthalidone this am )    Masks/face shield/appropriate PPE were worn for the entirety of the visit by the patient, MA, and provider.     Subjective        Snow Anderson presents to [facility]  History of Present Illness  Snow Anderson is a 72 y.o. female who presents to the clinic today to follow-up on diarrhea.  Patient was evaluated in the office last week for diarrhea and abdominal cramping.  At that time, patient did not have nausea, vomiting, fever, chills, or abdominal pain.  It was recommended patient continue liquid diet with progression to bland diet after 2 to 3 days.  CBC and CMP were performed.  CMP showed normal kidney function, normal liver function, and normal electrolytes.  CBC showed normal white blood cell count.  C. difficile testing was negative.  Since her last visit, patient's symptoms had started to improve, but Thursday she had a bite of a taco and some Pasta salad and had significant diarrhea.  She took 3 Imodium.  She went back to liquid diet then the BRAT diet.  Yesterday, she had chicken and boiled potatoes.  She is feeling better today and the diarrhea has slowed down.  She denies nausea, vomiting, fatigue, heart palpitations, or abdominal pain.  She does have some increased gas and burping.  She has been trying to increase her water intake with each bowel movement.  She does have an appetite and feels hungry.  She has been eating foods with probiotics including Activia and Keith yogurt.     Review of Systems   Neurological: Positive for weakness.       Objective   Vital Signs:  /82   Pulse 62   Temp 97 °F (36.1 °C)   Ht 162.6 cm (64\")   Wt 59.4 kg (131 lb)   SpO2 98%   BMI 22.49 kg/m²   Estimated body mass index is 22.49 kg/m² as " "calculated from the following:    Height as of this encounter: 162.6 cm (64\").    Weight as of this encounter: 59.4 kg (131 lb).       BMI is within normal parameters. No other follow-up for BMI required.      Physical Exam  Vitals reviewed.   Constitutional:       General: She is not in acute distress.     Appearance: Normal appearance. She is not ill-appearing, toxic-appearing or diaphoretic.   HENT:      Head: Normocephalic and atraumatic.   Pulmonary:      Effort: No respiratory distress.   Abdominal:      General: Bowel sounds are increased. There is no distension.      Palpations: Abdomen is soft. There is no mass.      Tenderness: There is no abdominal tenderness. There is no guarding or rebound.      Hernia: No hernia is present.   Neurological:      General: No focal deficit present.      Mental Status: She is alert and oriented to person, place, and time.      Motor: No weakness.      Gait: Gait normal.   Psychiatric:         Mood and Affect: Mood normal.         Behavior: Behavior normal.        Result Review :    Common labs    Common Labs 5/20/22 5/20/22 12/19/22 12/19/22 12/19/22 12/19/22 1/16/23 1/16/23    0442 0442 0951 0951 0951 0951 1121 1121   Glucose  100 (A) 109 (A)     91   BUN  18 20     15   Creatinine  0.98 0.93     0.88   Sodium  138 142     143   Potassium  3.7 3.6     4.0   Chloride  99 99     100   Calcium  9.5 9.6     9.9   Total Protein   6.7     6.4   Albumin   4.70     4.4   Total Bilirubin   0.6     0.3   Alkaline Phosphatase   97     96   AST (SGOT)   24     21   ALT (SGPT)   19     18   WBC 6.10   6.68   7.21    Hemoglobin 13.7   12.6   13.1    Hematocrit 39.5   38.0   38.7    Platelets 221   275   301    Total Cholesterol     208 (A)      Triglycerides     48      HDL Cholesterol     94 (A)      LDL Cholesterol      105 (A)      Hemoglobin A1C      5.50     (A) Abnormal value       Comments are available for some flowsheets but are not being displayed.                      "   Assessment and Plan   Diagnoses and all orders for this visit:    1. Diarrhea, unspecified type (Primary)  -     CT Abdomen Pelvis With Contrast; Future    2. Abdominal discomfort  -     CT Abdomen Pelvis With Contrast; Future      Patient's symptoms seem to be improving and patient is feeling well today.  She does have some bloating and burping occasionally, but this could be contributed to her history of acid reflux and possible gastritis.  She is taking omeprazole.  Unable to tolerate pantoprazole in the past.  She could try Pepcid at night.  She did have an EGD performed in September 2022 that was negative for H. pylori.  Patient does not have an acute abdomen today and has been able to tolerate food and water.  I recommended patient schedule a follow-up appointment with her gastroenterologist to discuss.  We also discussed trying to slowly incorporate foods to see how she responds.  It may be beneficial to proceed with CT scan if patient's symptoms continue.       Follow Up   Return if symptoms worsen or fail to improve.  Patient was given instructions and counseling regarding her condition or for health maintenance advice. Please see specific information pulled into the AVS if appropriate.       Answers for HPI/ROS submitted by the patient on 1/21/2023  Please describe your symptoms.: Follow up diarrhea and abdominal discomfort.  Have you had these symptoms before?: Yes  How long have you been having these symptoms?: 1-2 weeks  Please list any medications you are currently taking for this condition.: Imodium, liquid and then BRAT diet, kefir, yogurt,  What is the primary reason for your visit?: Other    Electronically signed by JANESSA Godwin, 01/26/23, 7:10 AM EST.

## 2023-01-23 ENCOUNTER — OFFICE VISIT (OUTPATIENT)
Dept: FAMILY MEDICINE CLINIC | Facility: CLINIC | Age: 73
End: 2023-01-23
Payer: MEDICARE

## 2023-01-23 VITALS
BODY MASS INDEX: 22.36 KG/M2 | TEMPERATURE: 97 F | DIASTOLIC BLOOD PRESSURE: 82 MMHG | OXYGEN SATURATION: 98 % | HEART RATE: 62 BPM | SYSTOLIC BLOOD PRESSURE: 130 MMHG | WEIGHT: 131 LBS | HEIGHT: 64 IN

## 2023-01-23 DIAGNOSIS — R10.9 ABDOMINAL DISCOMFORT: ICD-10-CM

## 2023-01-23 DIAGNOSIS — R19.7 DIARRHEA, UNSPECIFIED TYPE: Primary | ICD-10-CM

## 2023-01-23 PROCEDURE — 99213 OFFICE O/P EST LOW 20 MIN: CPT | Performed by: NURSE PRACTITIONER

## 2023-01-25 ENCOUNTER — OFFICE VISIT (OUTPATIENT)
Dept: GASTROENTEROLOGY | Facility: CLINIC | Age: 73
End: 2023-01-25
Payer: MEDICARE

## 2023-01-25 VITALS
TEMPERATURE: 96 F | HEIGHT: 64 IN | DIASTOLIC BLOOD PRESSURE: 66 MMHG | BODY MASS INDEX: 22.4 KG/M2 | WEIGHT: 131.2 LBS | SYSTOLIC BLOOD PRESSURE: 117 MMHG | HEART RATE: 72 BPM | OXYGEN SATURATION: 98 %

## 2023-01-25 DIAGNOSIS — K57.30 DIVERTICULOSIS OF LARGE INTESTINE WITHOUT HEMORRHAGE: Primary | ICD-10-CM

## 2023-01-25 DIAGNOSIS — K22.70 BARRETT'S ESOPHAGUS WITHOUT DYSPLASIA: ICD-10-CM

## 2023-01-25 PROCEDURE — 99213 OFFICE O/P EST LOW 20 MIN: CPT | Performed by: PHYSICIAN ASSISTANT

## 2023-01-25 NOTE — Clinical Note
2-year-old with history of constipation who presents in follow-up.  She a few episodes of diarrhea that sounds to be more of an overflow kind of picture.  Starting on daily fiber supplement.  Follow-up in 1 month.

## 2023-01-25 NOTE — PROGRESS NOTES
Chief Complaint  Abdominal Pain and Change in bowel movements     Subjective        History of Present Illness  Snow Anderson is a  72 y.o. female here for complaints of abdominal pain and change in bowel habits.  She is a patient of Dr. Lam, new to me today.  She was last seen by Dr. Lam on 11/16/2022.  Her past medical history is significant for GERD with Owens's changes out dysplasia identified on EGD 9/9/2022-recall EGD 2025.    She presents today with complaints of cramping lower abdominal pain followed by multiple episodes of loose stools but no watery diarrhea.  When this began she went to a liquid diet with resolution of the pain which prompted her to begin a brat diet.  She then went several days without a bowel movement and then had some diarrhea for which she took Imodium.  She reports she has had poor dietary choices throughout the holidays.  She does drink adequate water.  Prior to recent episode she was on stool softeners daily.  She has previously tried FiberCon in the past but believes she had an allergic reaction to one of the ingredients.  She has not tried a fiber supplement since.    Colonoscopy by Dr. Guido in June 2015: Pancolonic diverticulosis.    Past Medical History:   Diagnosis Date   • Abnormal kidney function    • Allergic 1970s    Penecillin; sulfa; sinus related allergies   • Allergic rhinitis    • Arthritis    • Asthma 09/2019    allergy induced   • Atherosclerosis of both carotid arteries    • Atrophic vaginitis    • Bell's palsy    • Cataract couple of years    noted at eye exam. very mild. no action needed at this time.   • Chronic kidney disease    • Cystitis 03/2018   • Cystocele with rectocele 01/2016   • Diverticulosis    • Dysuria    • Enterocele 01/2016   • GERD (gastroesophageal reflux disease) 2021    Mild. Taking pepsid. Controlled.   • Hypertension    • IFG (impaired fasting glucose)    • Low back pain 1980s    under control   • Mitral valve prolapse    •  Multiple thyroid nodules 2017    BILATERAL   • Osteoporosis    • Plantar fasciitis    • Plantar wart 10/2016    RIGHT FOOT   • PONV (postoperative nausea and vomiting)    • Seborrheic keratosis    • Sensorineural hearing loss     BILATERAL   • Umbilical hernia 2016   • Urge incontinence    • Vitamin D deficiency        Past Surgical History:   Procedure Laterality Date   • ANTERIOR AND POSTERIOR VAGINAL REPAIR N/A 2016    WITH ABDOMINAL ENTEROCELE REPAIR, SACROSPINOUS LIGAMENT FIXATION, AND CYSTOURETHROSCOPY, DR. JAROCHO MORRIS AT [place]   • COLONOSCOPY N/A 2015    MULTIPLE SMALL AND LARGE SEVERE DIVERTICULA, EVIDENCE OF AN IMPACTED DIVERTICULUM WITHOUT BLEEDING, RESCOPE IN 5-10 YRS, DR. LIZBETH WRAY AT [facility]   • DILATATION AND CURETTAGE N/A 2007    REMOVAL OF CERVICAL POLYP, DR. SRAVANTHI CARRERO AT [facility]   • DILATATION AND CURETTAGE N/A    • ENDOMETRIAL ABLATION N/A    • ENDOSCOPY N/A 2022    Procedure: ESOPHAGOGASTRODUODENOSCOPY WITH BIOPSIES;  Surgeon: Angela Lam MD;  Location: [facility] END[facility]COPY;  Service: Gastroenterology;  Laterality: N/A;  pre: heartburn, noncardiac chest pain  post: hiatal hernia, irregular Z line, probable Owens's: salmon colored mucosa 35-37cm, gastritis   • HERNIA REPAIR  2016    with hysterectomy   • HYSTERECTOMY N/A 2016    LAPAROSCOPIC HYSTERECTOMY WITH BSO, DR. JAROCHO MORRIS AT [place]   • SINOSCOPY PROCEDURE Left     baloon sinoplasty   • TUBAL ABDOMINAL LIGATION  1986   • UMBILICAL HERNIA REPAIR      repaired with hysterectomy    • UPPER GASTROINTESTINAL ENDOSCOPY  2022       Family History   Problem Relation Age of Onset   • Stroke Mother         Massive CVA;    • Hypertension Mother    • Hyperlipidemia Mother    • COPD Father    • Lung cancer Father    • Hearing loss Father    • Throat cancer Father         Under the tongue    • Heart attack Father    • Cancer Father         lung cancer   • Heart disease Father    • Alcohol  abuse Brother    • Atrial fibrillation Sister    • No Known Problems Maternal Grandmother    • No Known Problems Maternal Grandfather    • Diabetes Paternal Grandmother         in old age   • No Known Problems Paternal Grandfather    • Alcohol abuse Brother    • Alcohol abuse Brother            • Alcohol abuse Brother    • Breast cancer Neg Hx    • Colon cancer Neg Hx        Social History     Socioeconomic History   • Marital status:    Tobacco Use   • Smoking status: Never   • Smokeless tobacco: Never   Vaping Use   • Vaping Use: Never used   Substance and Sexual Activity   • Alcohol use: Yes     Alcohol/week: 1.0 standard drink     Types: 1 Glasses of wine per week     Comment: rare   • Drug use: No   • Sexual activity: Yes     Partners: Male     Birth control/protection: Post-menopausal     Comment:         Allergies   Allergen Reactions   • Sulfa Antibiotics Other (See Comments)     DECREASED WBC'S   • Penicillins Rash       Current Outpatient Medications on File Prior to Visit   Medication Sig Dispense Refill   • albuterol sulfate  (90 Base) MCG/ACT inhaler Every 6 (Six) Hours As Needed.     • Azelastine HCl 137 MCG/SPRAY solution As Needed.     • Biotin 1 MG capsule Daily.     • chlorthalidone (HYGROTON) 25 MG tablet Take 1 tablet by mouth once daily 90 tablet 0   • Cholecalciferol (VITAMIN D) 1000 UNITS tablet Take 2,000 Units by mouth.     • EPINEPHrine (EPIPEN JR) 0.15 MG/0.3ML solution auto-injector injection      • fexofenadine (ALLEGRA) 180 MG tablet      • hydrocortisone 2.5 % cream As Needed.     • ipratropium (ATROVENT) 0.06 % nasal spray USE 2 SPRAY(S) IN EACH NOSTRIL 3 TO 4 TIMES DAILY     • ipratropium-albuterol (DUO-NEB) 0.5-2.5 mg/3 ml nebulizer      • Multiple Vitamin (MULTI VITAMIN DAILY PO) Take  by mouth.     • omeprazole (priLOSEC) 20 MG capsule Take 1 capsule by mouth Every Morning Before Breakfast. 90 capsule 3   • potassium chloride (K-DUR,KLOR-CON) 20 MEQ CR  "tablet TAKE 1  BY MOUTH ONCE DAILY 90 tablet 0   • rosuvastatin (Crestor) 5 MG tablet Take 1 tablet by mouth Every Night. 30 tablet 2   • Trelegy Ellipta 200-62.5-25 MCG/INH inhaler Inhale 1 puff Daily.     • Triamcinolone Acetonide (NASACORT) 55 MCG/ACT nasal inhaler 2 sprays into the nostril(s) as directed by provider Daily.       No current facility-administered medications on file prior to visit.       Review of Systems     Objective   Vital Signs:   /66   Pulse 72   Temp 96 °F (35.6 °C)   Ht 162.6 cm (64\")   Wt 59.5 kg (131 lb 3.2 oz)   SpO2 98%   BMI 22.52 kg/m²       Physical Exam  Vitals and nursing note reviewed.   Constitutional:       General: She is not in acute distress.     Appearance: Normal appearance. She is not ill-appearing.   HENT:      Head: Normocephalic and atraumatic.      Right Ear: External ear normal.      Left Ear: External ear normal.   Eyes:      General: No scleral icterus.     Conjunctiva/sclera: Conjunctivae normal.      Pupils: Pupils are equal, round, and reactive to light.   Cardiovascular:      Rate and Rhythm: Normal rate and regular rhythm.      Heart sounds: Normal heart sounds.   Pulmonary:      Effort: Pulmonary effort is normal.      Breath sounds: Normal breath sounds.   Abdominal:      General: Bowel sounds are normal. There is no distension.      Palpations: Abdomen is soft.      Tenderness: There is no abdominal tenderness. There is no guarding or rebound.   Musculoskeletal:      Cervical back: Normal range of motion and neck supple.   Skin:     General: Skin is warm and dry.   Neurological:      Mental Status: She is alert and oriented to person, place, and time.   Psychiatric:         Mood and Affect: Mood normal.         Behavior: Behavior normal.          Result Review :       Common labs    Common Labs 5/20/22 5/20/22 12/19/22 12/19/22 12/19/22 12/19/22 1/16/23 1/16/23    0442 0442 0951 0951 0951 0951 1121 1121   Glucose  100 (A) 109 (A)     91   BUN  " 18 20     15   Creatinine  0.98 0.93     0.88   Sodium  138 142     143   Potassium  3.7 3.6     4.0   Chloride  99 99     100   Calcium  9.5 9.6     9.9   Total Protein   6.7     6.4   Albumin   4.70     4.4   Total Bilirubin   0.6     0.3   Alkaline Phosphatase   97     96   AST (SGOT)   24     21   ALT (SGPT)   19     18   WBC 6.10   6.68   7.21    Hemoglobin 13.7   12.6   13.1    Hematocrit 39.5   38.0   38.7    Platelets 221   275   301    Total Cholesterol     208 (A)      Triglycerides     48      HDL Cholesterol     94 (A)      LDL Cholesterol      105 (A)      Hemoglobin A1C      5.50     (A) Abnormal value       Comments are available for some flowsheets but are not being displayed.                               Assessment and Plan    Diagnoses and all orders for this visit:    1. Diverticulosis of large intestine without hemorrhage (Primary)    2. Owens's esophagus without dysplasia      · Continue 20 mg omeprazole daily as this is controlling her reflux symptoms.  · They believe she has a degree of constipation with subsequent overflow diarrhea.  She has been taking an adequate water however recommend beginning a fiber supplement.  She has not been able to tolerate FiberCon in the past due to some additives.  I encouraged her to look into Benefiber, Metamucil and Citrucel.  · Follow-up in 4 weeks, sooner if necessary.      Follow Up   Return in about 4 weeks (around 2/22/2023).    Dragon dictation used throughout this note.     MICHELE Donis

## 2023-02-02 DIAGNOSIS — I10 ESSENTIAL HYPERTENSION: ICD-10-CM

## 2023-02-02 RX ORDER — POTASSIUM CHLORIDE 20 MEQ/1
TABLET, EXTENDED RELEASE ORAL
Qty: 90 TABLET | Refills: 0 | Status: SHIPPED | OUTPATIENT
Start: 2023-02-02

## 2023-02-02 RX ORDER — CHLORTHALIDONE 25 MG/1
TABLET ORAL
Qty: 90 TABLET | Refills: 0 | Status: SHIPPED | OUTPATIENT
Start: 2023-02-02

## 2023-02-16 ENCOUNTER — OFFICE VISIT (OUTPATIENT)
Dept: GASTROENTEROLOGY | Facility: CLINIC | Age: 73
End: 2023-02-16
Payer: MEDICARE

## 2023-02-16 VITALS
HEART RATE: 67 BPM | SYSTOLIC BLOOD PRESSURE: 145 MMHG | BODY MASS INDEX: 23.18 KG/M2 | TEMPERATURE: 96 F | OXYGEN SATURATION: 98 % | DIASTOLIC BLOOD PRESSURE: 74 MMHG | HEIGHT: 64 IN | WEIGHT: 135.8 LBS

## 2023-02-16 DIAGNOSIS — K59.04 CHRONIC IDIOPATHIC CONSTIPATION: Primary | ICD-10-CM

## 2023-02-16 DIAGNOSIS — K22.70 BARRETT'S ESOPHAGUS WITHOUT DYSPLASIA: ICD-10-CM

## 2023-02-16 PROCEDURE — 99213 OFFICE O/P EST LOW 20 MIN: CPT | Performed by: PHYSICIAN ASSISTANT

## 2023-02-16 RX ORDER — DOCUSATE SODIUM 100 MG/1
100 CAPSULE, LIQUID FILLED ORAL 2 TIMES DAILY
COMMUNITY

## 2023-02-16 NOTE — PROGRESS NOTES
"Chief Complaint  Constipation    Subjective        History of Present Illness  Snow Anderson is a  72 y.o. female here for follow-up for constipation, Owens's esophagus.  She is a patient of Dr. Lam and was last seen by me in the clinic on 1/25/2023.  At her last visit it was recommended she begin a fiber supplement.    She presents today with no further episodes of overflow diarrhea but now with \"balls\" of stool on occasion.  She began taking 2 scoops of Benefiber daily.  She has not tried MiraLAX but instead is taking 1 Colace daily.  Her fluid intake is adequate.  Her p.o. intake has improved and she is now increased exercise with walking and weightlifting activities.    She is scheduled to go on an upcoming cruise followed by international travel again at the end of March.  She is feeling more confident about going on a cruise in regards to her bowel movements.    Colonoscopy by Dr. Guido in June 2015: Pancolonic diverticulosis.    Past Medical History:   Diagnosis Date   • Abnormal kidney function    • Allergic 1970s    Penecillin; sulfa; sinus related allergies   • Allergic rhinitis    • Arthritis    • Asthma 09/2019    allergy induced   • Atherosclerosis of both carotid arteries    • Atrophic vaginitis    • Bell's palsy    • Cataract couple of years    noted at eye exam. very mild. no action needed at this time.   • Chronic kidney disease    • Cystitis 03/2018   • Cystocele with rectocele 01/2016   • Diverticulosis    • Dysuria    • Enterocele 01/2016   • GERD (gastroesophageal reflux disease) 2021    Mild. Taking pepsid. Controlled.   • Hypertension    • IFG (impaired fasting glucose)    • Low back pain 1980s    under control   • Mitral valve prolapse    • Multiple thyroid nodules 2017    BILATERAL   • Osteoporosis    • Plantar fasciitis    • Plantar wart 10/2016    RIGHT FOOT   • PONV (postoperative nausea and vomiting)    • Seborrheic keratosis    • Sensorineural hearing loss     BILATERAL   • " Umbilical hernia 2016   • Urge incontinence    • Vitamin D deficiency        Past Surgical History:   Procedure Laterality Date   • ANTERIOR AND POSTERIOR VAGINAL REPAIR N/A 2016    WITH ABDOMINAL ENTEROCELE REPAIR, SACROSPINOUS LIGAMENT FIXATION, AND CYSTOURETHROSCOPY, DR. JAROCHO MORRIS AT [place]   • COLONOSCOPY N/A 2015    MULTIPLE SMALL AND LARGE SEVERE DIVERTICULA, EVIDENCE OF AN IMPACTED DIVERTICULUM WITHOUT BLEEDING, RESCOPE IN 5-10 YRS, DR. LIZBETH WRAY AT [facility]   • DILATATION AND CURETTAGE N/A 2007    REMOVAL OF CERVICAL POLYP, DR. SRAVANTHI CARRERO AT [facility]   • DILATATION AND CURETTAGE N/A    • ENDOMETRIAL ABLATION N/A    • ENDOSCOPY N/A 2022    Procedure: ESOPHAGOGASTRODUODENOSCOPY WITH BIOPSIES;  Surgeon: Angela Lam MD;  Location: [facility];  Service: Gastroenterology;  Laterality: N/A;  pre: heartburn, noncardiac chest pain  post: hiatal hernia, irregular Z line, probable Owens's: salmon colored mucosa 35-37cm, gastritis   • HERNIA REPAIR  2016    with hysterectomy   • HYSTERECTOMY N/A 2016    LAPAROSCOPIC HYSTERECTOMY WITH BSO, DR. JAROCHO MORRIS AT [place]   • SINOSCOPY PROCEDURE Left     baloon sinoplasty   • TUBAL ABDOMINAL LIGATION  1986   • UMBILICAL HERNIA REPAIR      repaired with hysterectomy    • UPPER GASTROINTESTINAL ENDOSCOPY  2022       Family History   Problem Relation Age of Onset   • Stroke Mother         Massive CVA;    • Hypertension Mother    • Hyperlipidemia Mother    • COPD Father    • Lung cancer Father    • Hearing loss Father    • Throat cancer Father         Under the tongue    • Heart attack Father    • Cancer Father         lung cancer   • Heart disease Father    • Alcohol abuse Brother    • Atrial fibrillation Sister    • No Known Problems Maternal Grandmother    • No Known Problems Maternal Grandfather    • Diabetes Paternal Grandmother         in old age   • No Known Problems Paternal Grandfather    • Alcohol  abuse Brother    • Alcohol abuse Brother            • Alcohol abuse Brother    • Breast cancer Neg Hx    • Colon cancer Neg Hx        Social History     Socioeconomic History   • Marital status:    Tobacco Use   • Smoking status: Never   • Smokeless tobacco: Never   Vaping Use   • Vaping Use: Never used   Substance and Sexual Activity   • Alcohol use: Yes     Alcohol/week: 1.0 standard drink     Types: 1 Glasses of wine per week     Comment: rare   • Drug use: No   • Sexual activity: Yes     Partners: Male     Birth control/protection: Post-menopausal     Comment:         Allergies   Allergen Reactions   • Sulfa Antibiotics Other (See Comments)     DECREASED WBC'S   • Penicillins Rash       Current Outpatient Medications on File Prior to Visit   Medication Sig Dispense Refill   • albuterol sulfate  (90 Base) MCG/ACT inhaler Every 6 (Six) Hours As Needed.     • Azelastine HCl 137 MCG/SPRAY solution As Needed.     • Biotin 1 MG capsule Daily.     • chlorthalidone (HYGROTON) 25 MG tablet Take 1 tablet by mouth once daily 90 tablet 0   • Cholecalciferol (VITAMIN D) 1000 UNITS tablet Take 2,000 Units by mouth.     • docusate sodium (COLACE) 100 MG capsule Take 100 mg by mouth 2 (Two) Times a Day.     • EPINEPHrine (EPIPEN JR) 0.15 MG/0.3ML solution auto-injector injection      • fexofenadine (ALLEGRA) 180 MG tablet      • hydrocortisone 2.5 % cream As Needed.     • ipratropium (ATROVENT) 0.06 % nasal spray USE 2 SPRAY(S) IN EACH NOSTRIL 3 TO 4 TIMES DAILY     • ipratropium-albuterol (DUO-NEB) 0.5-2.5 mg/3 ml nebulizer      • Multiple Vitamin (MULTI VITAMIN DAILY PO) Take  by mouth.     • omeprazole (priLOSEC) 20 MG capsule Take 1 capsule by mouth Every Morning Before Breakfast. 90 capsule 3   • potassium chloride (K-DUR,KLOR-CON) 20 MEQ CR tablet Take 1 tablet by mouth once daily 90 tablet 0   • rosuvastatin (Crestor) 5 MG tablet Take 1 tablet by mouth Every Night. 30 tablet 2   • Trelegy  "Ellipta 200-62.5-25 MCG/INH inhaler Inhale 1 puff Daily.     • Triamcinolone Acetonide (NASACORT) 55 MCG/ACT nasal inhaler 2 sprays into the nostril(s) as directed by provider Daily.       No current facility-administered medications on file prior to visit.       Review of Systems     Objective   Vital Signs:   /74   Pulse 67   Temp 96 °F (35.6 °C)   Ht 162.6 cm (64\")   Wt 61.6 kg (135 lb 12.8 oz)   SpO2 98%   BMI 23.31 kg/m²       Physical Exam  Vitals and nursing note reviewed.   Constitutional:       General: She is not in acute distress.     Appearance: Normal appearance. She is not ill-appearing.   HENT:      Head: Normocephalic and atraumatic.      Right Ear: External ear normal.      Left Ear: External ear normal.   Eyes:      General: No scleral icterus.     Conjunctiva/sclera: Conjunctivae normal.      Pupils: Pupils are equal, round, and reactive to light.   Cardiovascular:      Rate and Rhythm: Normal rate and regular rhythm.      Heart sounds: Normal heart sounds.   Pulmonary:      Effort: Pulmonary effort is normal.      Breath sounds: Normal breath sounds.   Abdominal:      General: Bowel sounds are normal. There is no distension.      Palpations: Abdomen is soft.      Tenderness: There is no abdominal tenderness. There is no guarding or rebound.   Musculoskeletal:      Cervical back: Normal range of motion and neck supple.   Skin:     General: Skin is warm and dry.   Neurological:      Mental Status: She is alert and oriented to person, place, and time.   Psychiatric:         Mood and Affect: Mood normal.         Behavior: Behavior normal.          Result Review :       Common labs    Common Labs 5/20/22 5/20/22 12/19/22 12/19/22 12/19/22 12/19/22 1/16/23 1/16/23    0442 0442 0951 0951 0951 0951 1121 1121   Glucose  100 (A) 109 (A)     91   BUN  18 20     15   Creatinine  0.98 0.93     0.88   Sodium  138 142     143   Potassium  3.7 3.6     4.0   Chloride  99 99     100   Calcium  9.5 9.6  "    9.9   Total Protein   6.7     6.4   Albumin   4.70     4.4   Total Bilirubin   0.6     0.3   Alkaline Phosphatase   97     96   AST (SGOT)   24     21   ALT (SGPT)   19     18   WBC 6.10   6.68   7.21    Hemoglobin 13.7   12.6   13.1    Hematocrit 39.5   38.0   38.7    Platelets 221   275   301    Total Cholesterol     208 (A)      Triglycerides     48      HDL Cholesterol     94 (A)      LDL Cholesterol      105 (A)      Hemoglobin A1C      5.50     (A) Abnormal value       Comments are available for some flowsheets but are not being displayed.                               Assessment and Plan    Diagnoses and all orders for this visit:    1. Chronic idiopathic constipation (Primary)    2. Owens's esophagus without dysplasia      · Recommend adding daily MiraLAX and titrating up or down based on bowel habits.  · Continue fiber supplementation.  · Follow-up in 4 weeks, sooner if necessary.      Follow Up   Return in about 4 weeks (around 3/16/2023).    Dragon dictation used throughout this note.     MICHELE Donis

## 2023-03-08 ENCOUNTER — OFFICE VISIT (OUTPATIENT)
Dept: GASTROENTEROLOGY | Facility: CLINIC | Age: 73
End: 2023-03-08
Payer: MEDICARE

## 2023-03-08 VITALS
BODY MASS INDEX: 23.08 KG/M2 | DIASTOLIC BLOOD PRESSURE: 79 MMHG | WEIGHT: 135.2 LBS | HEIGHT: 64 IN | TEMPERATURE: 96.4 F | SYSTOLIC BLOOD PRESSURE: 154 MMHG | OXYGEN SATURATION: 98 % | HEART RATE: 70 BPM

## 2023-03-08 DIAGNOSIS — K22.70 BARRETT'S ESOPHAGUS WITHOUT DYSPLASIA: Primary | ICD-10-CM

## 2023-03-08 DIAGNOSIS — K59.01 SLOW TRANSIT CONSTIPATION: ICD-10-CM

## 2023-03-08 PROCEDURE — 99213 OFFICE O/P EST LOW 20 MIN: CPT | Performed by: PHYSICIAN ASSISTANT

## 2023-03-08 NOTE — PROGRESS NOTES
Chief Complaint  Constipation    Subjective        History of Present Illness  Snow Anderson is a  72 y.o. female here for follow-up for constipation and Owens's esophagus.  She is a patient of Dr. Lam and was last seen in the clinic on 2/16/2023.    She began with daily MiraLAX and by day 5 her bowels were moving well.  She continued for several more days but then began having cramping abdominal pain.  While on the her cruise, she was approached regarding a bowel cleanse, Elemis.  She has been taking for the last several days and her bowels are moving regularly.  She continues with her fiber supplement but has discontinued MiraLAX.  Heartburn well controlled with 20 mg omeprazole daily.    Colonoscopy by Dr. Guido in June 2015: Pancolonic diverticulosis.    Past Medical History:   Diagnosis Date   • Abnormal kidney function    • Allergic 1970s    Penecillin; sulfa; sinus related allergies   • Allergic rhinitis    • Arthritis    • Asthma 09/2019    allergy induced   • Atherosclerosis of both carotid arteries    • Atrophic vaginitis    • Bell's palsy    • Cataract couple of years    noted at eye exam. very mild. no action needed at this time.   • Chronic kidney disease    • Cystitis 03/2018   • Cystocele with rectocele 01/2016   • Diverticulosis    • Dysuria    • Enterocele 01/2016   • GERD (gastroesophageal reflux disease) 2021    Mild. Taking pepsid. Controlled.   • Hypertension    • IFG (impaired fasting glucose)    • Low back pain 1980s    under control   • Mitral valve prolapse    • Multiple thyroid nodules 2017    BILATERAL   • Osteoporosis    • Plantar fasciitis    • Plantar wart 10/2016    RIGHT FOOT   • PONV (postoperative nausea and vomiting)    • Seborrheic keratosis    • Sensorineural hearing loss     BILATERAL   • Umbilical hernia 02/04/2016   • Urge incontinence    • Vitamin D deficiency        Past Surgical History:   Procedure Laterality Date   • ANTERIOR AND POSTERIOR VAGINAL REPAIR N/A  2016    WITH ABDOMINAL ENTEROCELE REPAIR, SACROSPINOUS LIGAMENT FIXATION, AND CYSTOURETHROSCOPY, DR. JAROCHO MORRIS AT [facility]   • COLONOSCOPY N/A 2015    MULTIPLE SMALL AND LARGE SEVERE DIVERTICULA, EVIDENCE OF AN IMPACTED DIVERTICULUM WITHOUT BLEEDING, RESCOPE IN 5-10 YRS, DR. LIZBETH WRAY AT [facility]   • DILATATION AND CURETTAGE N/A 2007    REMOVAL OF CERVICAL POLYP, DR. SRAVANTHI CARRERO AT [facility]   • DILATATION AND CURETTAGE N/A    • ENDOMETRIAL ABLATION N/A    • ENDOSCOPY N/A 2022    Procedure: ESOPHAGOGASTRODUODENOSCOPY WITH BIOPSIES;  Surgeon: Angela Lam MD;  Location: [facility] ENDOSCOPY;  Service: Gastroenterology;  Laterality: N/A;  pre: heartburn, noncardiac chest pain  post: hiatal hernia, irregular Z line, probable Owens's: salmon colored mucosa 35-37cm, gastritis   • HERNIA REPAIR  2016    with hysterectomy   • HYSTERECTOMY N/A 2016    LAPAROSCOPIC HYSTERECTOMY WITH BSO, DR. JAROCHO MORRIS AT [facility]   • SINOSCOPY PROCEDURE Left     baloon sinoplasty   • TUBAL ABDOMINAL LIGATION  1986   • UMBILICAL HERNIA REPAIR      repaired with hysterectomy    • UPPER GASTROINTESTINAL ENDOSCOPY  2022       Family History   Problem Relation Age of Onset   • Stroke Mother         Massive CVA;    • Hypertension Mother    • Hyperlipidemia Mother    • COPD Father    • Lung cancer Father    • Hearing loss Father    • Throat cancer Father         Under the tongue    • Heart attack Father    • Cancer Father         lung cancer   • Heart disease Father    • Alcohol abuse Brother    • Atrial fibrillation Sister    • No Known Problems Maternal Grandmother    • No Known Problems Maternal Grandfather    • Diabetes Paternal Grandmother         in old age   • No Known Problems Paternal Grandfather    • Alcohol abuse Brother    • Alcohol abuse Brother            • Alcohol abuse Brother    • Breast cancer Neg Hx    • Colon cancer Neg Hx        Social History     Socioeconomic  History   • Marital status:    Tobacco Use   • Smoking status: Never   • Smokeless tobacco: Never   Vaping Use   • Vaping Use: Never used   Substance and Sexual Activity   • Alcohol use: Yes     Alcohol/week: 1.0 standard drink     Types: 1 Glasses of wine per week     Comment: rare   • Drug use: No   • Sexual activity: Yes     Partners: Male     Birth control/protection: Post-menopausal     Comment:         Allergies   Allergen Reactions   • Sulfa Antibiotics Other (See Comments)     DECREASED WBC'S   • Penicillins Rash       Current Outpatient Medications on File Prior to Visit   Medication Sig Dispense Refill   • albuterol sulfate  (90 Base) MCG/ACT inhaler Every 6 (Six) Hours As Needed.     • Azelastine HCl 137 MCG/SPRAY solution As Needed.     • Biotin 1 MG capsule Daily.     • chlorthalidone (HYGROTON) 25 MG tablet Take 1 tablet by mouth once daily 90 tablet 0   • Cholecalciferol (VITAMIN D) 1000 UNITS tablet Take 2 tablets by mouth.     • EPINEPHrine (EPIPEN JR) 0.15 MG/0.3ML solution auto-injector injection      • fexofenadine (ALLEGRA) 180 MG tablet      • hydrocortisone 2.5 % cream As Needed.     • ipratropium (ATROVENT) 0.06 % nasal spray USE 2 SPRAY(S) IN EACH NOSTRIL 3 TO 4 TIMES DAILY     • ipratropium-albuterol (DUO-NEB) 0.5-2.5 mg/3 ml nebulizer      • Multiple Vitamin (MULTI VITAMIN DAILY PO) Take  by mouth.     • omeprazole (priLOSEC) 20 MG capsule Take 1 capsule by mouth Every Morning Before Breakfast. 90 capsule 3   • potassium chloride (K-DUR,KLOR-CON) 20 MEQ CR tablet Take 1 tablet by mouth once daily 90 tablet 0   • rosuvastatin (Crestor) 5 MG tablet Take 1 tablet by mouth Every Night. 30 tablet 2   • Trelegy Ellipta 200-62.5-25 MCG/INH inhaler Inhale 1 puff Daily.     • Triamcinolone Acetonide (NASACORT) 55 MCG/ACT nasal inhaler 2 sprays into the nostril(s) as directed by provider Daily.     • docusate sodium (COLACE) 100 MG capsule Take 1 capsule by mouth 2 (Two) Times a  "Day.       No current facility-administered medications on file prior to visit.       Review of Systems     Objective   Vital Signs:   /79   Pulse 70   Temp 96.4 °F (35.8 °C)   Ht 162.6 cm (64\")   Wt 61.3 kg (135 lb 3.2 oz)   SpO2 98%   BMI 23.21 kg/m²       Physical Exam  Vitals and nursing note reviewed.   Constitutional:       General: She is not in acute distress.     Appearance: Normal appearance. She is not ill-appearing.   HENT:      Head: Normocephalic and atraumatic.      Right Ear: External ear normal.      Left Ear: External ear normal.   Eyes:      General: No scleral icterus.     Conjunctiva/sclera: Conjunctivae normal.      Pupils: Pupils are equal, round, and reactive to light.   Pulmonary:      Effort: Pulmonary effort is normal.   Musculoskeletal:      Cervical back: Normal range of motion and neck supple.   Skin:     General: Skin is warm and dry.   Neurological:      Mental Status: She is alert and oriented to person, place, and time.   Psychiatric:         Mood and Affect: Mood normal.         Behavior: Behavior normal.          Result Review :       Common labs    Common Labs 5/20/22 5/20/22 12/19/22 12/19/22 12/19/22 12/19/22 1/16/23 1/16/23    0442 [id number] [id number] 0951 0951 1121 1121   Glucose  100 (A) 109 (A)     91   BUN  18 20     15   Creatinine  0.98 0.93     0.88   Sodium  138 142     143   Potassium  3.7 3.6     4.0   Chloride  99 99     100   Calcium  9.5 9.6     9.9   Total Protein   6.7     6.4   Albumin   4.70     4.4   Total Bilirubin   0.6     0.3   Alkaline Phosphatase   97     96   AST (SGOT)   24     21   ALT (SGPT)   19     18   WBC 6.10   6.68   7.21    Hemoglobin 13.7   12.6   13.1    Hematocrit 39.5   38.0   38.7    Platelets 221   275   301    Total Cholesterol     208 (A)      Triglycerides     48      HDL Cholesterol     94 (A)      LDL Cholesterol      105 (A)      Hemoglobin A1C      5.50     (A) Abnormal value       Comments are available for some " flowsheets but are not being displayed.                               Assessment and Plan    Diagnoses and all orders for this visit:    1. Owens's esophagus without dysplasia (Primary)    2. Slow transit constipation      · Her bowels are currently moving well without Colace or MiraLAX.  The bowel cleanse that she is taking is not FDA approved therefore I cannot condone its use and advised her to proceed with caution.  We did discuss that should she begin to struggle again she may titrate the MiraLAX up or down based on bowel habits.  · Continue fiber supplementation.  · Continue 20 mg omeprazole daily.  · Follow-up with Dr. Lam in 3 months, sooner if necessary.        Follow Up   Return in about 3 months (around 6/8/2023) for Dr. Lam.    Dragon dictation used throughout this note.     MICHELE Donis

## 2023-03-10 DIAGNOSIS — E78.41 ELEVATED LIPOPROTEIN(A): ICD-10-CM

## 2023-03-10 RX ORDER — ROSUVASTATIN CALCIUM 5 MG/1
5 TABLET, COATED ORAL NIGHTLY
Qty: 30 TABLET | Refills: 2 | Status: SHIPPED | OUTPATIENT
Start: 2023-03-10

## 2023-05-01 DIAGNOSIS — I10 ESSENTIAL HYPERTENSION: ICD-10-CM

## 2023-05-01 RX ORDER — POTASSIUM CHLORIDE 20 MEQ/1
TABLET, EXTENDED RELEASE ORAL
Qty: 90 TABLET | Refills: 1 | Status: SHIPPED | OUTPATIENT
Start: 2023-05-01

## 2023-05-01 RX ORDER — CHLORTHALIDONE 25 MG/1
TABLET ORAL
Qty: 90 TABLET | Refills: 1 | Status: SHIPPED | OUTPATIENT
Start: 2023-05-01

## 2023-05-01 NOTE — TELEPHONE ENCOUNTER
Rx Refill Note  Requested Prescriptions     Pending Prescriptions Disp Refills   • chlorthalidone (HYGROTON) 25 MG tablet [Pharmacy Med Name: Chlorthalidone 25 MG Oral Tablet] 90 tablet 0     Sig: Take 1 tablet by mouth once daily   • potassium chloride (K-DUR,KLOR-CON) 20 MEQ CR tablet [Pharmacy Med Name: Potassium Chloride Toya ER 20 MEQ Oral Tablet Extended Release] 90 tablet 0     Sig: Take 1 tablet by mouth once daily      Last office visit with prescribing clinician: 12/29/2022   Last telemedicine visit with prescribing clinician: 6/29/2023   Next office visit with prescribing clinician: 6/29/2023                         Would you like a call back once the refill request has been completed: [] Yes [] No    If the office needs to give you a call back, can they leave a voicemail: [] Yes [] No    Tristin Kang MA  05/01/23, 09:10 EDT

## 2023-05-19 ENCOUNTER — OFFICE VISIT (OUTPATIENT)
Dept: FAMILY MEDICINE CLINIC | Facility: CLINIC | Age: 73
End: 2023-05-19
Payer: MEDICARE

## 2023-05-19 VITALS
SYSTOLIC BLOOD PRESSURE: 128 MMHG | OXYGEN SATURATION: 99 % | HEIGHT: 64 IN | DIASTOLIC BLOOD PRESSURE: 80 MMHG | WEIGHT: 132.2 LBS | RESPIRATION RATE: 16 BRPM | BODY MASS INDEX: 22.57 KG/M2 | TEMPERATURE: 97.8 F | HEART RATE: 65 BPM

## 2023-05-19 DIAGNOSIS — N30.01 ACUTE CYSTITIS WITH HEMATURIA: ICD-10-CM

## 2023-05-19 DIAGNOSIS — N82.3 RECTO-VAGINAL FISTULA: ICD-10-CM

## 2023-05-19 DIAGNOSIS — Z78.0 POSTMENOPAUSAL: ICD-10-CM

## 2023-05-19 DIAGNOSIS — R31.9 HEMATURIA, UNSPECIFIED TYPE: Primary | ICD-10-CM

## 2023-05-19 DIAGNOSIS — I10 PRIMARY HYPERTENSION: ICD-10-CM

## 2023-05-19 DIAGNOSIS — R82.998 LEUKOCYTES IN URINE: ICD-10-CM

## 2023-05-19 DIAGNOSIS — R73.01 IMPAIRED FASTING GLUCOSE: ICD-10-CM

## 2023-05-19 DIAGNOSIS — E55.9 HYPOVITAMINOSIS D: ICD-10-CM

## 2023-05-19 LAB
BILIRUB BLD-MCNC: NEGATIVE MG/DL
CLARITY, POC: CLEAR
COLOR UR: YELLOW
EXPIRATION DATE: ABNORMAL
GLUCOSE UR STRIP-MCNC: NEGATIVE MG/DL
KETONES UR QL: NEGATIVE
LEUKOCYTE EST, POC: ABNORMAL
Lab: ABNORMAL
NITRITE UR-MCNC: POSITIVE MG/ML
PH UR: 6.5 [PH] (ref 5–8)
PROT UR STRIP-MCNC: NEGATIVE MG/DL
RBC # UR STRIP: ABNORMAL /UL
SP GR UR: 1.01 (ref 1–1.03)
UROBILINOGEN UR QL: ABNORMAL

## 2023-05-19 PROCEDURE — 81003 URINALYSIS AUTO W/O SCOPE: CPT | Performed by: FAMILY MEDICINE

## 2023-05-19 PROCEDURE — 3079F DIAST BP 80-89 MM HG: CPT | Performed by: FAMILY MEDICINE

## 2023-05-19 PROCEDURE — 99214 OFFICE O/P EST MOD 30 MIN: CPT | Performed by: FAMILY MEDICINE

## 2023-05-19 PROCEDURE — 1159F MED LIST DOCD IN RCRD: CPT | Performed by: FAMILY MEDICINE

## 2023-05-19 PROCEDURE — 1160F RVW MEDS BY RX/DR IN RCRD: CPT | Performed by: FAMILY MEDICINE

## 2023-05-19 PROCEDURE — 3074F SYST BP LT 130 MM HG: CPT | Performed by: FAMILY MEDICINE

## 2023-05-19 RX ORDER — NITROFURANTOIN 25; 75 MG/1; MG/1
100 CAPSULE ORAL 2 TIMES DAILY
Qty: 14 CAPSULE | Refills: 0 | Status: SHIPPED | OUTPATIENT
Start: 2023-05-19 | End: 2023-05-26

## 2023-05-19 NOTE — PROGRESS NOTES
"Chief Complaint  Urinary Tract Infection (Has being going on for while no pain, has burning has a smell at times.)    Subjective        Snow Anderson presents to [facility]  History of Present Illness  Pleasant 73-year-old female here to follow-up for symptoms of increased burning with urination and a change in the odor to the urine that started at least since Jan that has come and gone.  No fever no flank pain no red flags.  It is a severe cramping pain - but usually resolves if she does not get enough water. No discharge. She has seen Dr Jhaveri - (UroGYN) - thought she may need a surgery to close a small rectovaginal fistula.  Now she is having to be more diligent with wiping with defecation with this fistula, she does think it is getting worse.    Her last UTI on chart here was about a year ago, it grew Citrobacter and was resistant to Bactrim, Augmentin and tetracycline but sensitive to Macrobid. She had a UTI in May and Oct.  She has severe pain when she urinates.     Objective   Vital Signs:  /80   Pulse 65   Temp 97.8 °F (36.6 °C)   Resp 16   Ht 162.6 cm (64.02\")   Wt 60 kg (132 lb 3.2 oz)   SpO2 99%   BMI 22.68 kg/m²   Estimated body mass index is 22.68 kg/m² as calculated from the following:    Height as of this encounter: 162.6 cm (64.02\").    Weight as of this encounter: 60 kg (132 lb 3.2 oz).       BMI is within normal parameters. No other follow-up for BMI required.      Physical Exam  Vitals and nursing note reviewed.   Constitutional:       General: She is not in acute distress.     Appearance: She is well-developed.   HENT:      Head: Normocephalic.      Nose: Nose normal.   Eyes:      General: No scleral icterus.  Pulmonary:      Effort: Pulmonary effort is normal. No respiratory distress.   Abdominal:      Comments: No suprapubic tenderness, no CVA tenderness.   Musculoskeletal:         General: Normal range of motion.   Skin:     General: Skin is warm " and dry.      Findings: No rash.   Neurological:      Mental Status: She is alert and oriented to person, place, and time.   Psychiatric:         Behavior: Behavior normal.         Thought Content: Thought content normal.         Judgment: Judgment normal.        Result Review :          Office Visit on 05/19/2023   Component Date Value Ref Range Status   • Color 05/19/2023 Yellow  Yellow, Straw, Dark Yellow, Padmaja Final   • Clarity, UA 05/19/2023 Clear  Clear Final   • Specific Gravity  05/19/2023 1.015  1.005 - 1.030 Final   • pH, Urine 05/19/2023 6.5  5.0 - 8.0 Final   • Leukocytes 05/19/2023 Large (3+) (A)  Negative Final   • Nitrite, UA 05/19/2023 Positive (A)  Negative Final   • Protein, POC 05/19/2023 Negative  Negative mg/dL Final   • Glucose, UA 05/19/2023 Negative  Negative mg/dL Final   • Ketones, UA 05/19/2023 Negative  Negative Final   • Urobilinogen, UA 05/19/2023 0.2 E.U./dL  Normal, 0.2 E.U./dL Final   • Bilirubin 05/19/2023 Negative  Negative Final   • Blood, UA 05/19/2023 Trace (A)  Negative Final   • Lot Number 05/19/2023 [id number]   Final   • Expiration Date 05/19/2023 6/30/2024   Final              Assessment and Plan   Diagnoses and all orders for this visit:    1. Hematuria, unspecified type (Primary)  -     POC Urinalysis Dipstick, Automated  -     Urine Culture - Urine, Urine, Clean Catch    2. Leukocytes in urine  -     Urine Culture - Urine, Urine, Clean Catch    3. Acute cystitis with hematuria  -     nitrofurantoin, macrocrystal-monohydrate, (MACROBID) 100 MG capsule; Take 1 capsule by mouth 2 (Two) Times a Day for 7 days.  Dispense: 14 capsule; Refill: 0  -     Ambulatory Referral to Gynecologic Urology    4. Recto-vaginal fistula  -     Ambulatory Referral to Gynecologic Urology    5. Postmenopausal  -     DEXA Bone Density Axial; Future    6. Primary hypertension  -     Comprehensive Metabolic Panel; Future  -     CBC & Differential; Future  -     Lipid Panel; Future    7. Impaired  fasting glucose  -     Hemoglobin A1c; Future    8. Hypovitaminosis D  -     Vitamin D,25-Hydroxy; Future    Very pleasant 73-year-old female here for acute cystitis, she does have known complications of a rectovaginal fistula that is not well controlled and possibly worsening, she was seen by Dr. Sanford and by Dr. Jhaveri a few years ago, recommendations for repair when she was ready.  She has noticed that she is starting to get urinary tract infections more frequently, she has been evaluated for this twice in the last year but she reports multiple UTIs since January but she has been treating them on her own.  We discussed seeking evaluation sooner if she gets symptoms, will treat with Macrobid as above, allergic to sulfa and penicillin.  Last culture resistant to Augmentin.  Will likely reconsider culture again in June with her next appointment to see if she is having treatment resistant UTIs if she is having symptoms almost continuously now.    Will refer to urogyn as above.    She is also due for the above testing prior to her next appointment, she is overall been well controlled but will get labs prior to her appointment.         Follow Up   Return if symptoms worsen or fail to improve.  Patient was given instructions and counseling regarding her condition or for health maintenance advice. Please see specific information pulled into the AVS if appropriate.   Lilo Hernandez MD        Answers for HPI/ROS submitted by the patient on 5/16/2023  What is the primary reason for your visit?: Painful Urination

## 2023-05-24 LAB
BACTERIA UR CULT: ABNORMAL
BACTERIA UR CULT: ABNORMAL
OTHER ANTIBIOTIC SUSC ISLT: ABNORMAL

## 2023-06-08 ENCOUNTER — TELEPHONE (OUTPATIENT)
Dept: FAMILY MEDICINE CLINIC | Facility: CLINIC | Age: 73
End: 2023-06-08

## 2023-06-08 ENCOUNTER — OFFICE VISIT (OUTPATIENT)
Dept: FAMILY MEDICINE CLINIC | Facility: CLINIC | Age: 73
End: 2023-06-08
Payer: MEDICARE

## 2023-06-08 VITALS — HEIGHT: 64 IN | TEMPERATURE: 97.7 F | BODY MASS INDEX: 22.88 KG/M2 | WEIGHT: 134 LBS

## 2023-06-08 DIAGNOSIS — R30.0 DYSURIA: Primary | ICD-10-CM

## 2023-06-08 DIAGNOSIS — N30.00 ACUTE CYSTITIS WITHOUT HEMATURIA: ICD-10-CM

## 2023-06-08 LAB
BILIRUB BLD-MCNC: NEGATIVE MG/DL
CLARITY, POC: CLEAR
COLOR UR: YELLOW
EXPIRATION DATE: ABNORMAL
GLUCOSE UR STRIP-MCNC: NEGATIVE MG/DL
KETONES UR QL: NEGATIVE
LEUKOCYTE EST, POC: ABNORMAL
Lab: ABNORMAL
NITRITE UR-MCNC: NEGATIVE MG/ML
PH UR: 6 [PH] (ref 5–8)
PROT UR STRIP-MCNC: NEGATIVE MG/DL
RBC # UR STRIP: ABNORMAL /UL
SP GR UR: 1.01 (ref 1–1.03)
UROBILINOGEN UR QL: NORMAL

## 2023-06-08 PROCEDURE — 81003 URINALYSIS AUTO W/O SCOPE: CPT | Performed by: NURSE PRACTITIONER

## 2023-06-08 PROCEDURE — 99213 OFFICE O/P EST LOW 20 MIN: CPT | Performed by: NURSE PRACTITIONER

## 2023-06-08 RX ORDER — CEPHALEXIN 500 MG/1
500 CAPSULE ORAL 2 TIMES DAILY
Qty: 14 CAPSULE | Refills: 0 | Status: SHIPPED | OUTPATIENT
Start: 2023-06-08 | End: 2023-06-15

## 2023-06-08 NOTE — TELEPHONE ENCOUNTER
PATIENT CALLED STATING THAT SHE IS EXPERIENCING UTI SYMPTOMS AGAIN: FREQUENT URINATION / CLOUDY URINE / PAINFUL URINATION - SHE WOULD LIKE TO KNOW IF DR. REYES WOULD WANT HER TO COME IN FOR ANOTHER URINALYSIS.    PLEASE ADVISE  [phone]

## 2023-06-08 NOTE — PROGRESS NOTES
"Chief Complaint  Urinary Tract Infection (Cloudy urine and cramping since Tuesday )    Subjective        Snow Anderson presents to [facility]  History of Present Illness  Snow Anderson is a 73 y.o. female who presents to the clinic today with concerns of a urinary tract infection. Patient has a history of frequent urinary tract infections.  She does have known complications of rectovaginal fistula according to her PCPs last note.  She is following with Dr. Sanford and Dr. Jhaveri.  Patient had a urine culture performed 5/19/2023 that was positive for E. coli.  Patient was prescribed Macrobid. Her symptoms improved. Her current symptoms started 2 days ago.  She did have an episode of soft stool that was difficult to clean herself properly 4 days ago.  Patient has had cloudy urine and cramping.  She denies hematuria, fever, or flank pain.  She has an appointment with your gynecologist at your al in August.      Review of Systems   Constitutional:  Negative for chills, fatigue and fever.   Genitourinary:  Positive for dysuria. Negative for flank pain and hematuria.     Objective   Vital Signs:  Temp 97.7 °F (36.5 °C)   Ht 162.6 cm (64.02\")   Wt 60.8 kg (134 lb)   BMI 22.99 kg/m²   Estimated body mass index is 22.99 kg/m² as calculated from the following:    Height as of this encounter: 162.6 cm (64.02\").    Weight as of this encounter: 60.8 kg (134 lb).       BMI is within normal parameters. No other follow-up for BMI required.      Physical Exam  Vitals reviewed.   Constitutional:       General: She is not in acute distress.     Appearance: Normal appearance. She is not ill-appearing, toxic-appearing or diaphoretic.   HENT:      Head: Atraumatic.   Eyes:      General: No scleral icterus.        Right eye: No discharge.         Left eye: No discharge.      Extraocular Movements: Extraocular movements intact.   Abdominal:      Tenderness: There is abdominal tenderness (suprapubic). " There is no right CVA tenderness or left CVA tenderness.   Neurological:      General: No focal deficit present.      Mental Status: She is alert and oriented to person, place, and time.      Motor: No weakness.      Gait: Gait normal.   Psychiatric:         Mood and Affect: Mood normal.         Behavior: Behavior normal.      Result Review :          Brief Urine Lab Results  (Last result in the past 365 days)        Color   Clarity   Blood   Leuk Est   Nitrite   Protein   CREAT   Urine HCG        06/08/23 1144 Yellow   Clear   Trace   Large (3+)   Negative   Negative                           Assessment and Plan   Diagnoses and all orders for this visit:    1. Dysuria (Primary)  -     POCT urinalysis dipstick, automated  -     Urine Culture - Urine, Urine, Clean Catch    2. Acute cystitis without hematuria  -     cephalexin (KEFLEX) 500 MG capsule; Take 1 capsule by mouth 2 (Two) Times a Day for 7 days.  Dispense: 14 capsule; Refill: 0      Reviewed urine culture performed in May.  Based on patient's symptoms and urinalysis, will treat with cephalexin.  She has tolerated cephalosporins well in the past.  Urine will be sent for culture today.  Patient advised to call if symptoms do not improve or worsen.  She will follow with urogynecology as advised.          Follow Up   Return if symptoms worsen or fail to improve.  Patient was given instructions and counseling regarding her condition or for health maintenance advice. Please see specific information pulled into the AVS if appropriate.       Electronically signed by JANESSA Godwin, 06/08/23, 12:09 PM EDT.

## 2023-06-15 LAB
BACTERIA UR CULT: ABNORMAL
BACTERIA UR CULT: ABNORMAL
OTHER ANTIBIOTIC SUSC ISLT: ABNORMAL

## 2023-07-24 DIAGNOSIS — N30.01 ACUTE CYSTITIS WITH HEMATURIA: Primary | ICD-10-CM

## 2023-08-04 ENCOUNTER — TRANSCRIBE ORDERS (OUTPATIENT)
Dept: ADMINISTRATIVE | Facility: HOSPITAL | Age: 73
End: 2023-08-04
Payer: MEDICARE

## 2023-08-04 DIAGNOSIS — Z12.31 BREAST CANCER SCREENING BY MAMMOGRAM: Primary | ICD-10-CM

## 2023-09-01 ENCOUNTER — TRANSCRIBE ORDERS (OUTPATIENT)
Dept: ADMINISTRATIVE | Facility: HOSPITAL | Age: 73
End: 2023-09-01
Payer: MEDICARE

## 2023-09-01 DIAGNOSIS — N39.0 RECURRENT UTI: Primary | ICD-10-CM

## 2023-09-06 ENCOUNTER — HOSPITAL ENCOUNTER (OUTPATIENT)
Dept: BONE DENSITY | Facility: HOSPITAL | Age: 73
Discharge: HOME | End: 2023-09-06
Admitting: FAMILY MEDICINE
Payer: MEDICARE

## 2023-09-06 DIAGNOSIS — Z78.0 POSTMENOPAUSAL: ICD-10-CM

## 2023-09-06 PROCEDURE — 77080 DXA BONE DENSITY AXIAL: CPT

## 2023-09-14 ENCOUNTER — HOSPITAL ENCOUNTER (OUTPATIENT)
Dept: MAMMOGRAPHY | Facility: HOSPITAL | Age: 73
Discharge: HOME | End: 2023-09-14
Admitting: FAMILY MEDICINE
Payer: MEDICARE

## 2023-09-14 DIAGNOSIS — Z12.31 BREAST CANCER SCREENING BY MAMMOGRAM: ICD-10-CM

## 2023-09-14 PROCEDURE — 77067 SCR MAMMO BI INCL CAD: CPT

## 2023-09-14 PROCEDURE — 77063 BREAST TOMOSYNTHESIS BI: CPT

## 2023-09-18 ENCOUNTER — HOSPITAL ENCOUNTER (OUTPATIENT)
Dept: ULTRASOUND IMAGING | Facility: HOSPITAL | Age: 73
Discharge: HOME | End: 2023-09-18
Admitting: OBSTETRICS & GYNECOLOGY
Payer: MEDICARE

## 2023-09-18 DIAGNOSIS — N39.0 RECURRENT UTI: ICD-10-CM

## 2023-09-18 PROCEDURE — 76775 US EXAM ABDO BACK WALL LIM: CPT

## 2023-11-05 DIAGNOSIS — I10 ESSENTIAL HYPERTENSION: ICD-10-CM

## 2023-11-06 RX ORDER — OMEPRAZOLE 20 MG/1
20 CAPSULE, DELAYED RELEASE ORAL DAILY
Qty: 90 CAPSULE | Refills: 0 | Status: SHIPPED | OUTPATIENT
Start: 2023-11-06

## 2023-11-06 RX ORDER — POTASSIUM CHLORIDE 20 MEQ/1
20 TABLET, EXTENDED RELEASE ORAL DAILY
Qty: 90 TABLET | Refills: 0 | Status: SHIPPED | OUTPATIENT
Start: 2023-11-06

## 2023-11-06 RX ORDER — CHLORTHALIDONE 25 MG/1
TABLET ORAL
Qty: 90 TABLET | Refills: 0 | Status: SHIPPED | OUTPATIENT
Start: 2023-11-06

## 2023-11-06 NOTE — TELEPHONE ENCOUNTER
Rx Refill Note  Requested Prescriptions     Pending Prescriptions Disp Refills    chlorthalidone (HYGROTON) 25 MG tablet [Pharmacy Med Name: Chlorthalidone 25 MG Oral Tablet] 90 tablet 0     Sig: Take 1 tablet by mouth once daily    potassium chloride (K-DUR,KLOR-CON) 20 MEQ CR tablet [Pharmacy Med Name: Potassium Chloride Toya ER 20 MEQ Oral Tablet Extended Release] 90 tablet 0     Sig: TAKE 1  BY MOUTH ONCE DAILY    omeprazole (priLOSEC) 20 MG capsule [Pharmacy Med Name: Omeprazole 20 MG Oral Capsule Delayed Release] 90 capsule 0     Sig: Take 1 capsule by mouth once daily      Last office visit with prescribing clinician: 6/29/2023   Last telemedicine visit with prescribing clinician: Visit date not found   Next office visit with prescribing clinician: 1/30/2024                         Would you like a call back once the refill request has been completed: [] Yes [] No    If the office needs to give you a call back, can they leave a voicemail: [] Yes [] No    Tristin Kang CMA/LMR  11/06/23, 07:59 EST

## 2023-12-18 ENCOUNTER — OFFICE VISIT (OUTPATIENT)
Dept: GASTROENTEROLOGY | Facility: CLINIC | Age: 73
End: 2023-12-18
Payer: MEDICARE

## 2023-12-18 VITALS
SYSTOLIC BLOOD PRESSURE: 118 MMHG | DIASTOLIC BLOOD PRESSURE: 76 MMHG | WEIGHT: 136.5 LBS | BODY MASS INDEX: 23.31 KG/M2 | HEIGHT: 64 IN | HEART RATE: 62 BPM | OXYGEN SATURATION: 98 % | TEMPERATURE: 96.3 F

## 2023-12-18 DIAGNOSIS — K22.70 BARRETT'S ESOPHAGUS WITHOUT DYSPLASIA: ICD-10-CM

## 2023-12-18 DIAGNOSIS — K59.04 CHRONIC IDIOPATHIC CONSTIPATION: Primary | ICD-10-CM

## 2023-12-18 RX ORDER — ESTRADIOL 0.1 MG/G
1 CREAM VAGINAL
COMMUNITY
Start: 2023-08-30

## 2023-12-18 RX ORDER — OMEPRAZOLE 20 MG/1
20 CAPSULE, DELAYED RELEASE ORAL DAILY
Qty: 90 CAPSULE | Refills: 3 | Status: SHIPPED | OUTPATIENT
Start: 2023-12-18

## 2023-12-18 NOTE — PROGRESS NOTES
"Chief Complaint  Owens's esophagus without dysplasia    Subjective          History of Present Illness    Snow Anderson is a  73 y.o. female presents for follow-up on GERD with history of Owens's esophagus without dysplasia, diverticulosis, and chronic constipation.  She is a former patient Dr. Lam last seen on 7/12/2023.  She will follow with Dr. Bustos. She is new to me.    She has chronic constipation and takes Benefiber and fiber bars. Has stopped stool softeners which were causing excessive stool with wiping. Constipation goes in cycles with softer stools at times.  She did see urogynecology and has had biofeedback physical therapy for pelvic floor dysfunction in the past.  She performs exercise at home now.  Overall she feels she is doing much better and has figured out a regimen that works well for her.    She is on omeprazole 20 mg daily.  This controls her heartburn well.  She takes peppermint altoids for esophageal spasms.    9/2022 EGD which showed nondysplastic short segment Owens's esophagus, mild gastritis, normal duodenum, medium sized hiatal hernia.  Pathology showed normal duodenal biopsies and Owens's esophagus without dysplasia.  Last colonoscopy in 2015 with Dr. Guido    Objective   Vital Signs:   /76   Pulse 62   Temp 96.3 °F (35.7 °C)   Ht 162.6 cm (64\")   Wt 61.9 kg (136 lb 8 oz)   SpO2 98%   BMI 23.43 kg/m²       Physical Exam  Vitals reviewed.   Constitutional:       General: She is awake. She is not in acute distress.     Appearance: Normal appearance. She is well-developed and well-groomed.   HENT:      Head: Normocephalic.   Pulmonary:      Effort: Pulmonary effort is normal. No respiratory distress.   Skin:     Coloration: Skin is not pale.   Neurological:      Mental Status: She is alert and oriented to person, place, and time.      Gait: Gait is intact.   Psychiatric:         Mood and Affect: Mood and affect normal.         Speech: Speech normal.         " Behavior: Behavior is cooperative.         Judgment: Judgment normal.          Result Review :             Assessment and Plan    Diagnoses and all orders for this visit:    1. Chronic idiopathic constipation (Primary)    2. Owens's esophagus without dysplasia    Other orders  -     omeprazole (priLOSEC) 20 MG capsule; Take 1 capsule by mouth Daily.  Dispense: 90 capsule; Refill: 3    Continue high fiber to regulate stools.  Continue exercises for pelvic floor dysfunction at home.  Continue omeprazole daily for Owens's esophagus.    Due EGD and Colonoscopy in 2025. She agrees to follow with Dr. Bustos.     Follow Up   Return in about 1 year (around 12/18/2024).    Dragon dictation used throughout this note.     Felisa Lundberg PA-C

## 2024-01-30 ENCOUNTER — OFFICE VISIT (OUTPATIENT)
Dept: FAMILY MEDICINE CLINIC | Facility: CLINIC | Age: 74
End: 2024-01-30
Payer: MEDICARE

## 2024-01-30 VITALS
TEMPERATURE: 97.6 F | OXYGEN SATURATION: 99 % | HEART RATE: 61 BPM | DIASTOLIC BLOOD PRESSURE: 70 MMHG | HEIGHT: 64 IN | BODY MASS INDEX: 23.22 KG/M2 | SYSTOLIC BLOOD PRESSURE: 148 MMHG | WEIGHT: 136 LBS

## 2024-01-30 DIAGNOSIS — I10 PRIMARY HYPERTENSION: ICD-10-CM

## 2024-01-30 DIAGNOSIS — Z00.00 MEDICARE ANNUAL WELLNESS VISIT, SUBSEQUENT: Primary | ICD-10-CM

## 2024-01-30 DIAGNOSIS — F51.01 PRIMARY INSOMNIA: ICD-10-CM

## 2024-01-30 PROCEDURE — 3078F DIAST BP <80 MM HG: CPT | Performed by: FAMILY MEDICINE

## 2024-01-30 PROCEDURE — 1159F MED LIST DOCD IN RCRD: CPT | Performed by: FAMILY MEDICINE

## 2024-01-30 PROCEDURE — 3077F SYST BP >= 140 MM HG: CPT | Performed by: FAMILY MEDICINE

## 2024-01-30 PROCEDURE — 1160F RVW MEDS BY RX/DR IN RCRD: CPT | Performed by: FAMILY MEDICINE

## 2024-01-30 PROCEDURE — 99214 OFFICE O/P EST MOD 30 MIN: CPT | Performed by: FAMILY MEDICINE

## 2024-01-30 PROCEDURE — G0439 PPPS, SUBSEQ VISIT: HCPCS | Performed by: FAMILY MEDICINE

## 2024-01-30 PROCEDURE — 1170F FXNL STATUS ASSESSED: CPT | Performed by: FAMILY MEDICINE

## 2024-01-30 RX ORDER — DOXEPIN HYDROCHLORIDE 10 MG/1
10 CAPSULE ORAL NIGHTLY
Qty: 30 CAPSULE | Refills: 1 | Status: SHIPPED | OUTPATIENT
Start: 2024-01-30

## 2024-01-30 RX ORDER — ASPIRIN 81 MG/1
81 TABLET ORAL DAILY
COMMUNITY

## 2024-03-19 DIAGNOSIS — F51.01 PRIMARY INSOMNIA: ICD-10-CM

## 2024-03-19 RX ORDER — DOXEPIN HYDROCHLORIDE 10 MG/1
10 CAPSULE ORAL NIGHTLY
Qty: 30 CAPSULE | Refills: 0 | Status: SHIPPED | OUTPATIENT
Start: 2024-03-19

## 2024-03-24 DIAGNOSIS — E78.41 ELEVATED LIPOPROTEIN(A): ICD-10-CM

## 2024-03-25 RX ORDER — ROSUVASTATIN CALCIUM 5 MG/1
5 TABLET, COATED ORAL NIGHTLY
Qty: 90 TABLET | Refills: 0 | Status: SHIPPED | OUTPATIENT
Start: 2024-03-25

## 2024-04-06 DIAGNOSIS — I10 ESSENTIAL HYPERTENSION: ICD-10-CM

## 2024-04-08 RX ORDER — CHLORTHALIDONE 25 MG/1
TABLET ORAL
Qty: 90 TABLET | Refills: 0 | Status: SHIPPED | OUTPATIENT
Start: 2024-04-08

## 2024-04-08 RX ORDER — POTASSIUM CHLORIDE 20 MEQ/1
20 TABLET, EXTENDED RELEASE ORAL DAILY
Qty: 90 TABLET | Refills: 0 | Status: SHIPPED | OUTPATIENT
Start: 2024-04-08

## 2024-05-17 ENCOUNTER — OFFICE VISIT (OUTPATIENT)
Dept: FAMILY MEDICINE CLINIC | Facility: CLINIC | Age: 74
End: 2024-05-17
Payer: MEDICARE

## 2024-05-17 VITALS
SYSTOLIC BLOOD PRESSURE: 156 MMHG | BODY MASS INDEX: 23.56 KG/M2 | HEART RATE: 78 BPM | HEIGHT: 64 IN | WEIGHT: 138 LBS | DIASTOLIC BLOOD PRESSURE: 70 MMHG | TEMPERATURE: 97.6 F | OXYGEN SATURATION: 98 %

## 2024-05-17 DIAGNOSIS — I10 PRIMARY HYPERTENSION: Primary | ICD-10-CM

## 2024-05-17 DIAGNOSIS — R73.01 IMPAIRED FASTING GLUCOSE: ICD-10-CM

## 2024-05-17 DIAGNOSIS — R60.0 PEDAL EDEMA: ICD-10-CM

## 2024-05-17 PROCEDURE — 1159F MED LIST DOCD IN RCRD: CPT | Performed by: FAMILY MEDICINE

## 2024-05-17 PROCEDURE — 1126F AMNT PAIN NOTED NONE PRSNT: CPT | Performed by: FAMILY MEDICINE

## 2024-05-17 PROCEDURE — 99214 OFFICE O/P EST MOD 30 MIN: CPT | Performed by: FAMILY MEDICINE

## 2024-05-17 PROCEDURE — 3077F SYST BP >= 140 MM HG: CPT | Performed by: FAMILY MEDICINE

## 2024-05-17 PROCEDURE — 3078F DIAST BP <80 MM HG: CPT | Performed by: FAMILY MEDICINE

## 2024-05-17 PROCEDURE — 1160F RVW MEDS BY RX/DR IN RCRD: CPT | Performed by: FAMILY MEDICINE

## 2024-05-17 RX ORDER — PREDNISONE 20 MG/1
20 TABLET ORAL DAILY
COMMUNITY
Start: 2024-05-16

## 2024-05-17 RX ORDER — AMLODIPINE BESYLATE 2.5 MG/1
2.5 TABLET ORAL DAILY
Qty: 90 TABLET | Refills: 1 | Status: SHIPPED | OUTPATIENT
Start: 2024-05-17

## 2024-05-17 RX ORDER — FUROSEMIDE 20 MG/1
20 TABLET ORAL DAILY PRN
Qty: 30 TABLET | Refills: 2 | Status: SHIPPED | OUTPATIENT
Start: 2024-05-17

## 2024-05-17 NOTE — PROGRESS NOTES
"Chief Complaint  Hypertension (6 months f/u  has some fluctuations  but are not being to bad at all ) and Insomnia (Follow up   stopped  but it was helping and  would like to ask to the doctor if can take it prn   )    Subjective        Snow Anderson presents to [facility]  History of Present Illness  Pleasant 74-year-old female here for     hypertension that is not well-controlled, some fluctuations at home and her home blood pressure log overall is above goal, blood pressures are usually in the 140s to 150s systolic.  Asymptomatic overall.  Adherent with chlorthalidone 25 but she has had some episodes where her blood pressure also can drop a little lower into the 105 range which just caused her to feel slightly dizzy.    insomnia that is not well-controlled currently, she was previously on Silenor or doxepin 10 mg with some improvement    Her allergies have been wort and it is all in her sinuses and her eyes, she on steroids with a lot of congestion. She thinks that is worsening her blood pressure numbers, additionally she takes some decongestants which seem to be impacting her blood pressure as well.    She fell on a boat when she was helping clean it and was wearing flip flops ans slipped when she was getting out. She caught her R arms.  She is improving overall it has been about 5 days.  Some bruising but no serious injury.    Objective   Vital Signs:  /70   Pulse 78   Temp 97.6 °F (36.4 °C)   Ht 162.6 cm (64\")   Wt 62.6 kg (138 lb)   SpO2 98%   BMI 23.69 kg/m²   Estimated body mass index is 23.69 kg/m² as calculated from the following:    Height as of this encounter: 162.6 cm (64\").    Weight as of this encounter: 62.6 kg (138 lb).       BMI is within normal parameters. No other follow-up for BMI required.      Physical Exam  Vitals and nursing note reviewed.   Constitutional:       General: She is not in acute distress.     Appearance: She is well-developed. "   HENT:      Head: Normocephalic.      Nose: Nose normal.   Eyes:      General: No scleral icterus.  Pulmonary:      Effort: Pulmonary effort is normal. No respiratory distress.   Musculoskeletal:         General: Normal range of motion.   Skin:     General: Skin is warm and dry.      Findings: No rash.   Neurological:      Mental Status: She is alert and oriented to person, place, and time.   Psychiatric:         Behavior: Behavior normal.         Thought Content: Thought content normal.         Judgment: Judgment normal.        Result Review :                     Assessment and Plan     Diagnoses and all orders for this visit:    1. Primary hypertension (Primary)  Assessment & Plan:  Hypertension not well-controlled, she is not able to take the chlorthalidone daily as her blood pressures drop but if she takes them episodically and her blood pressures are often in the 150s systolic.  Goal blood pressure less than 140/90 ideally try to keep her closer to the 130s systolic.  The chlorthalidone also doubled to help with pedal edema.    Adverse effects:  Lisinopril: Cough  Chlorthalidone: Hypotension    Plan:  - Continue with nonpharmacologic treatments including exercise.  - Discontinue chlorthalidone 25 mg daily  - Start hydrochlorothiazide at 25 mg, this is not as strong as the chlorthalidone so my hope is that she will be able to get more consistent blood pressure control.    Orders:  -     amLODIPine (NORVASC) 2.5 MG tablet; Take 1 tablet by mouth Daily. Blood pressure  Dispense: 90 tablet; Refill: 1  -     Comprehensive Metabolic Panel; Future  -     CBC & Differential; Future  -     Lipid Panel; Future  -     TSH Rfx On Abnormal To Free T4; Future    2. Pedal edema  -     furosemide (Lasix) 20 MG tablet; Take 1 tablet by mouth Daily As Needed (swelling). With potassium  Dispense: 30 tablet; Refill: 2    3. Impaired fasting glucose  -     Hemoglobin A1c; Future    Pedal edema, okay to use as needed Lasix,  continue potassium to take with Lasix.         Follow Up     Return in about 3 months (around 8/17/2024), or if symptoms worsen or fail to improve, for Recheck HTN wth labs prior .  Patient was given instructions and counseling regarding her condition or for health maintenance advice. Please see specific information pulled into the AVS if appropriate.

## 2024-05-17 NOTE — ASSESSMENT & PLAN NOTE
Hypertension not well-controlled, she is not able to take the chlorthalidone daily as her blood pressures drop but if she takes them episodically and her blood pressures are often in the 150s systolic.  Goal blood pressure less than 140/90 ideally try to keep her closer to the 130s systolic.  The chlorthalidone also doubled to help with pedal edema.    Adverse effects:  Lisinopril: Cough  Chlorthalidone: Hypotension    Plan:  - Continue with nonpharmacologic treatments including exercise.  - Discontinue chlorthalidone 25 mg daily  - Start hydrochlorothiazide at 25 mg, this is not as strong as the chlorthalidone so my hope is that she will be able to get more consistent blood pressure control.

## 2024-06-01 DIAGNOSIS — E78.41 ELEVATED LIPOPROTEIN(A): ICD-10-CM

## 2024-06-03 RX ORDER — ROSUVASTATIN CALCIUM 5 MG/1
5 TABLET, COATED ORAL NIGHTLY
Qty: 90 TABLET | Refills: 0 | Status: SHIPPED | OUTPATIENT
Start: 2024-06-03

## 2024-07-17 ENCOUNTER — TRANSCRIBE ORDERS (OUTPATIENT)
Dept: ADMINISTRATIVE | Facility: HOSPITAL | Age: 74
End: 2024-07-17
Payer: MEDICARE

## 2024-07-17 DIAGNOSIS — Z12.31 BREAST CANCER SCREENING BY MAMMOGRAM: Primary | ICD-10-CM

## 2024-08-26 ENCOUNTER — OFFICE VISIT (OUTPATIENT)
Dept: FAMILY MEDICINE CLINIC | Facility: CLINIC | Age: 74
End: 2024-08-26
Payer: MEDICARE

## 2024-08-26 VITALS
DIASTOLIC BLOOD PRESSURE: 70 MMHG | TEMPERATURE: 98 F | BODY MASS INDEX: 24.21 KG/M2 | WEIGHT: 141.8 LBS | HEART RATE: 62 BPM | OXYGEN SATURATION: 98 % | HEIGHT: 64 IN | SYSTOLIC BLOOD PRESSURE: 160 MMHG

## 2024-08-26 DIAGNOSIS — I10 PRIMARY HYPERTENSION: Primary | ICD-10-CM

## 2024-08-26 DIAGNOSIS — R73.01 IMPAIRED FASTING GLUCOSE: ICD-10-CM

## 2024-08-26 DIAGNOSIS — R60.0 PEDAL EDEMA: ICD-10-CM

## 2024-08-26 PROCEDURE — 1160F RVW MEDS BY RX/DR IN RCRD: CPT | Performed by: FAMILY MEDICINE

## 2024-08-26 PROCEDURE — 1126F AMNT PAIN NOTED NONE PRSNT: CPT | Performed by: FAMILY MEDICINE

## 2024-08-26 PROCEDURE — 3078F DIAST BP <80 MM HG: CPT | Performed by: FAMILY MEDICINE

## 2024-08-26 PROCEDURE — 99214 OFFICE O/P EST MOD 30 MIN: CPT | Performed by: FAMILY MEDICINE

## 2024-08-26 PROCEDURE — 1159F MED LIST DOCD IN RCRD: CPT | Performed by: FAMILY MEDICINE

## 2024-08-26 PROCEDURE — 3077F SYST BP >= 140 MM HG: CPT | Performed by: FAMILY MEDICINE

## 2024-08-26 RX ORDER — OLMESARTAN MEDOXOMIL 5 MG/1
5 TABLET ORAL DAILY
Qty: 30 TABLET | Refills: 3 | Status: SHIPPED | OUTPATIENT
Start: 2024-08-26

## 2024-08-26 NOTE — PROGRESS NOTES
"Chief Complaint  Hypertension (Follow up medication change) Hyperglycemia     Subjective        Snow Anderson presents to [facility]  History of Present Illness  Pleasant 74-year-old female here to follow-up for hypertension which is not well-controlled, she is currently on amlodipine 2.5 mg daily for blood pressure and Lasix for swelling.  However she notices that her leg swelling has been increasing since starting amlodipine.  We had some difficulty finding a blood pressure medication she had cough with lisinopril and hypotension with chlorthalidone.  At home her blood pressure is jumping around and left her log at home, bp is usually in the 140s/80 nd usually in the 130/70s.  One week where was in the 120s.  Noticing it has a lot to do with her life and how caught up she feels when quilting.    Swelling: decreased with less salt.  She has dependent edema in the evening, mornings are fine. Worst since starting the amlodipine.     Hyperglycemia well-controlled and stable.    Objective   Vital Signs:  /70   Pulse 62   Temp 98 °F (36.7 °C)   Ht 162.6 cm (64\")   Wt 64.3 kg (141 lb 12.8 oz)   SpO2 98%   BMI 24.34 kg/m²   Estimated body mass index is 24.34 kg/m² as calculated from the following:    Height as of this encounter: 162.6 cm (64\").    Weight as of this encounter: 64.3 kg (141 lb 12.8 oz).    BMI is within normal parameters. No other follow-up for BMI required.      Physical Exam  Vitals and nursing note reviewed.   Constitutional:       General: She is not in acute distress.     Appearance: She is well-developed.   HENT:      Head: Normocephalic.      Nose: Nose normal.   Cardiovascular:      Rate and Rhythm: Normal rate and regular rhythm.      Heart sounds: Normal heart sounds. No murmur heard.  Pulmonary:      Effort: Pulmonary effort is normal. No respiratory distress.      Breath sounds: Normal breath sounds.   Musculoskeletal:         General: Normal range " of motion.   Skin:     General: Skin is warm and dry.      Findings: No rash.   Neurological:      Mental Status: She is alert and oriented to person, place, and time.   Psychiatric:         Behavior: Behavior normal.         Thought Content: Thought content normal.         Judgment: Judgment normal.        Result Review :  The following data was reviewed by: Lilo Hernandez MD on 08/26/2024:         TSH Rfx On Abnormal To Free T4 (08/12/2024 13:27)  Hemoglobin A1c (08/12/2024 13:27)  Lipid Panel (08/12/2024 13:27)  CBC & Differential (08/12/2024 13:27)  Comprehensive Metabolic Panel (08/12/2024 13:27)     Assessment and Plan   Diagnoses and all orders for this visit:    1. Primary hypertension (Primary)  Assessment & Plan:  Hypertension not well-controlled, she is not able to take the chlorthalidone daily as her blood pressures drop but if she takes them episodically and her blood pressures are often in the 150s systolic.  Goal blood pressure less than 140/90 ideally try to keep her closer to the 130s systolic.  The chlorthalidone also doubled to help with pedal edema.    Adverse effects:  Lisinopril: Cough  Chlorthalidone: Hypotension  Amlodipine: Ankle swelling    Plan:  - Continue with nonpharmacologic treatments including exercise.  - Stop amlodipine 2.5 mg daily  - Start Olmesartan 5mg daily  - OK to take Lasix 20mg as needed.   - FU 3 months     Orders:  -     olmesartan (BENICAR) 5 MG tablet; Take 1 tablet by mouth Daily.  Dispense: 30 tablet; Refill: 3    2. Impaired fasting glucose    3. Pedal edema    Lasix for swelling as needed.    Hyperglycemia well-controlled no medications continue with lifestyle modification    We also discussed her [employer] screening that is normal, she had some critical elevated blood pressures but was very irritated with some of the mixups that occurred         Follow Up   Return in about 3 months (around 11/26/2024), or if symptoms worsen or fail to improve, for Recheck HTN  .  Patient was given instructions and counseling regarding her condition or for health maintenance advice. Please see specific information pulled into the AVS if appropriate.

## 2024-08-26 NOTE — ASSESSMENT & PLAN NOTE
Hypertension not well-controlled, she is not able to take the chlorthalidone daily as her blood pressures drop but if she takes them episodically and her blood pressures are often in the 150s systolic.  Goal blood pressure less than 140/90 ideally try to keep her closer to the 130s systolic.  The chlorthalidone also doubled to help with pedal edema.    Adverse effects:  Lisinopril: Cough  Chlorthalidone: Hypotension  Amlodipine: Ankle swelling    Plan:  - Continue with nonpharmacologic treatments including exercise.  - Stop amlodipine 2.5 mg daily  - Start Olmesartan 5mg daily  - OK to take Lasix 20mg as needed.   - FU 3 months

## 2024-09-05 DIAGNOSIS — E78.41 ELEVATED LIPOPROTEIN(A): ICD-10-CM

## 2024-09-05 RX ORDER — ROSUVASTATIN CALCIUM 5 MG/1
5 TABLET, COATED ORAL NIGHTLY
Qty: 90 TABLET | Refills: 0 | Status: SHIPPED | OUTPATIENT
Start: 2024-09-05

## 2024-09-16 ENCOUNTER — HOSPITAL ENCOUNTER (OUTPATIENT)
Dept: MAMMOGRAPHY | Facility: HOSPITAL | Age: 74
Discharge: HOME | End: 2024-09-16
Admitting: FAMILY MEDICINE
Payer: MEDICARE

## 2024-09-16 DIAGNOSIS — Z12.31 BREAST CANCER SCREENING BY MAMMOGRAM: ICD-10-CM

## 2024-09-16 PROCEDURE — 77067 SCR MAMMO BI INCL CAD: CPT

## 2024-09-16 PROCEDURE — 77063 BREAST TOMOSYNTHESIS BI: CPT

## 2024-12-04 ENCOUNTER — OFFICE VISIT (OUTPATIENT)
Dept: GASTROENTEROLOGY | Facility: CLINIC | Age: 74
End: 2024-12-04
Payer: MEDICARE

## 2024-12-04 ENCOUNTER — TELEPHONE (OUTPATIENT)
Dept: GASTROENTEROLOGY | Facility: CLINIC | Age: 74
End: 2024-12-04
Payer: MEDICARE

## 2024-12-04 VITALS
DIASTOLIC BLOOD PRESSURE: 77 MMHG | HEART RATE: 59 BPM | SYSTOLIC BLOOD PRESSURE: 164 MMHG | HEIGHT: 64 IN | WEIGHT: 142.2 LBS | BODY MASS INDEX: 24.28 KG/M2

## 2024-12-04 DIAGNOSIS — Z12.11 ENCOUNTER FOR SCREENING FOR MALIGNANT NEOPLASM OF COLON: ICD-10-CM

## 2024-12-04 DIAGNOSIS — K59.04 CHRONIC IDIOPATHIC CONSTIPATION: Primary | ICD-10-CM

## 2024-12-04 DIAGNOSIS — K22.70 BARRETT'S ESOPHAGUS WITHOUT DYSPLASIA: ICD-10-CM

## 2024-12-04 RX ORDER — OLMESARTAN MEDOXOMIL 20 MG/1
1 TABLET ORAL DAILY
COMMUNITY
Start: 2024-11-26 | End: 2024-12-04

## 2024-12-04 NOTE — PROGRESS NOTES
"Chief Complaint  Owens's esophagus without dysplasia    Subjective          History of Present Illness    Snow Anderson is a  74 y.o. female presents for follow-up on GERD with history of Owens's esophagus without dysplasia, diverticulosis, and chronic constipation. She is a former patient Dr. Lam last seen by me on 12/18/2023.    She has chronic constipation and takes Benefiber and fiber bars to help regulate her stool. Constipation is cyclic, worse if decreased fluids, banana's help. She did see urogynecology and has had biofeedback physical therapy for pelvic floor dysfunction in the past.     She is on omeprazole 20 mg daily.  This controls her heartburn well.  She takes peppermint altoids for esophageal spasms.    From 12/18/2023 office visit:  9/2022 EGD which showed nondysplastic short segment Owens's esophagus, mild gastritis, normal duodenum, medium sized hiatal hernia.  Pathology showed normal duodenal biopsies and Owens's esophagus without dysplasia.    Last colonoscopy in 2015 with Dr. Guido    Objective   Vital Signs:   /77 (BP Location: Left arm, Patient Position: Sitting, Cuff Size: Adult)   Pulse 59   Ht 162.6 cm (64\")   Wt 64.5 kg (142 lb 3.2 oz)   BMI 24.41 kg/m²       Physical Exam  Vitals reviewed.   Constitutional:       General: She is awake. She is not in acute distress.     Appearance: Normal appearance. She is well-developed and well-groomed.   HENT:      Head: Normocephalic.   Pulmonary:      Effort: Pulmonary effort is normal. No respiratory distress.   Skin:     Coloration: Skin is not pale.   Neurological:      Mental Status: She is alert and oriented to person, place, and time.      Gait: Gait is intact.   Psychiatric:         Mood and Affect: Mood and affect normal.         Speech: Speech normal.         Behavior: Behavior is cooperative.         Judgment: Judgment normal.          Result Review :             Assessment and Plan    Diagnoses and all orders for " this visit:    1. Chronic idiopathic constipation (Primary)    2. Owens's esophagus without dysplasia  -     Case Request; Standing  -     Implement Anesthesia orders day of procedure.; Standing  -     Verify bowel prep was successful; Standing  -     Give tap water enema if bowel prep was insufficient; Standing  -     Case Request    3. Encounter for screening for malignant neoplasm of colon  -     Case Request; Standing  -     Implement Anesthesia orders day of procedure.; Standing  -     Verify bowel prep was successful; Standing  -     Give tap water enema if bowel prep was insufficient; Standing  -     Case Request    Other orders  -     omeprazole (priLOSEC) 20 MG capsule; Take 1 capsule by mouth Daily.  Dispense: 90 capsule; Refill: 3    Doing well from a GI standpoint.  Continue same including PPI.    She is due for EGD for Owens's esophagus and colonoscopy for screening.  She agrees to proceed    She felt very weak and wiped out after prep for previous colonoscopy. She has allergy/intolerance to Sodium lauryl sulfate. Dulcolax and Golytely contain this. Miralax brand does not.  Will proceed with MiraLAX prep.  Recommended she do MiraLAX 1 dose twice daily the day before her prep considering she will not be able to do Dulcolax tablets.    Follow Up   Return in about 1 year (around 12/4/2025).    Dragon dictation used throughout this note.     Felisa Lundberg PA-C

## 2024-12-04 NOTE — TELEPHONE ENCOUNTER
FARZANEH Ralph for COLONOSCOPY on  3/10/25  arrive at 8:30  . Gave prep instructions to pt in office ....miralax

## 2024-12-12 ENCOUNTER — OFFICE VISIT (OUTPATIENT)
Dept: FAMILY MEDICINE CLINIC | Facility: CLINIC | Age: 74
End: 2024-12-12
Payer: MEDICARE

## 2024-12-12 VITALS
WEIGHT: 142 LBS | DIASTOLIC BLOOD PRESSURE: 78 MMHG | SYSTOLIC BLOOD PRESSURE: 150 MMHG | TEMPERATURE: 97.6 F | BODY MASS INDEX: 24.24 KG/M2 | HEIGHT: 64 IN | OXYGEN SATURATION: 98 % | HEART RATE: 66 BPM

## 2024-12-12 DIAGNOSIS — E78.41 ELEVATED LIPOPROTEIN(A): ICD-10-CM

## 2024-12-12 DIAGNOSIS — M25.561 ACUTE PAIN OF RIGHT KNEE: ICD-10-CM

## 2024-12-12 DIAGNOSIS — I10 PRIMARY HYPERTENSION: Primary | ICD-10-CM

## 2024-12-12 DIAGNOSIS — R73.01 IMPAIRED FASTING GLUCOSE: ICD-10-CM

## 2024-12-12 DIAGNOSIS — E55.9 HYPOVITAMINOSIS D: ICD-10-CM

## 2024-12-12 PROCEDURE — 1159F MED LIST DOCD IN RCRD: CPT | Performed by: FAMILY MEDICINE

## 2024-12-12 PROCEDURE — 3077F SYST BP >= 140 MM HG: CPT | Performed by: FAMILY MEDICINE

## 2024-12-12 PROCEDURE — 1126F AMNT PAIN NOTED NONE PRSNT: CPT | Performed by: FAMILY MEDICINE

## 2024-12-12 PROCEDURE — G2211 COMPLEX E/M VISIT ADD ON: HCPCS | Performed by: FAMILY MEDICINE

## 2024-12-12 PROCEDURE — 1160F RVW MEDS BY RX/DR IN RCRD: CPT | Performed by: FAMILY MEDICINE

## 2024-12-12 PROCEDURE — 99214 OFFICE O/P EST MOD 30 MIN: CPT | Performed by: FAMILY MEDICINE

## 2024-12-12 PROCEDURE — 3078F DIAST BP <80 MM HG: CPT | Performed by: FAMILY MEDICINE

## 2024-12-12 RX ORDER — ROSUVASTATIN CALCIUM 5 MG/1
5 TABLET, COATED ORAL NIGHTLY
Qty: 90 TABLET | Refills: 1 | Status: SHIPPED | OUTPATIENT
Start: 2024-12-12

## 2024-12-12 RX ORDER — OLMESARTAN MEDOXOMIL 5 MG/1
5 TABLET ORAL DAILY
Qty: 90 TABLET | Refills: 1 | Status: SHIPPED | OUTPATIENT
Start: 2024-12-12

## 2024-12-12 NOTE — ASSESSMENT & PLAN NOTE
Hypertension not well-controlled today but in pain, based on her home log-previous to this week when not in pain her numbers are normal, she is not able to take the chlorthalidone daily as her blood pressures drop but if she takes them episodically and her blood pressures are often in the 150s systolic.  Goal blood pressure less than 140/90 ideally try to keep her closer to the 130s systolic.  The chlorthalidone also doubled to help with pedal edema.    Adverse effects:  Lisinopril: Cough  Chlorthalidone: Hypotension  Amlodipine: Ankle swelling    Plan:  - Continue with nonpharmacologic treatments including exercise.  - Stop amlodipine 2.5 mg daily  - Start Olmesartan 5mg daily  - OK to take Lasix 20mg as needed.   - FU 3 months

## 2024-12-12 NOTE — PROGRESS NOTES
"Chief Complaint  Hypertension ( No chest  pain ,no edema or soa ), Knee Pain (Right knee pain  since Monday a week ago is doing better using knee brace    but wanted to make sure with dr condon what she is doing is ok ), and Hyperlipidemia (Will need refill on medications )    Subjective        Snow Anderson presents to [facility]  History of Present Illness  Pleasant 74-year-old female here to follow-up for     hypertension that is not well-controlled.  She has had adverse effects of cough with lisinopril, low blood pressure with chlorthalidone, swelling with amlodipine.  Goal to keep blood pressure less than 140/90, asymptomatic.  At the last appointment we stopped amlodipine 2.5 mg and started olmesartan 5 mg daily.  No cough or side effects.   At home her BP has has been in the 112/50s - 135/60s.  Elevated only in the last week with knee pain.     Right knee pain that has been present for about 1 week.  She has more pain moving it to the side.  It aches, she did not do anything.  She did it is on the lateral knee.  Stable with brace on but weaker.  When going down steps it has felt like it will hyperextend. She takes tylenol and ice.  She is noticing gradual improvements, some joint laxity    Hyperlipidemia well-controlled on current meds     Objective   Vital Signs:  /78   Pulse 66   Temp 97.6 °F (36.4 °C)   Ht 162.6 cm (64\")   Wt 64.4 kg (142 lb)   SpO2 98%   BMI 24.37 kg/m²   Estimated body mass index is 24.37 kg/m² as calculated from the following:    Height as of this encounter: 162.6 cm (64\").    Weight as of this encounter: 64.4 kg (142 lb).    BMI is within normal parameters. No other follow-up for BMI required.      Physical Exam  Vitals and nursing note reviewed.   Constitutional:       General: She is not in acute distress.     Appearance: She is well-developed.   HENT:      Head: Normocephalic.      Nose: Nose normal.   Cardiovascular:      Rate and " Rhythm: Normal rate and regular rhythm.      Heart sounds: Normal heart sounds. No murmur heard.  Pulmonary:      Effort: Pulmonary effort is normal. No respiratory distress.      Breath sounds: Normal breath sounds.   Musculoskeletal:         General: Normal range of motion.   Skin:     General: Skin is warm and dry.      Findings: No rash.   Neurological:      Mental Status: She is alert and oriented to person, place, and time.   Psychiatric:         Behavior: Behavior normal.         Thought Content: Thought content normal.         Judgment: Judgment normal.        Result Review :                Assessment and Plan   Diagnoses and all orders for this visit:    1. Primary hypertension (Primary)  Assessment & Plan:  Hypertension not well-controlled today but in pain, based on her home log-previous to this week when not in pain her numbers are normal, she is not able to take the chlorthalidone daily as her blood pressures drop but if she takes them episodically and her blood pressures are often in the 150s systolic.  Goal blood pressure less than 140/90 ideally try to keep her closer to the 130s systolic.  The chlorthalidone also doubled to help with pedal edema.    Adverse effects:  Lisinopril: Cough  Chlorthalidone: Hypotension  Amlodipine: Ankle swelling    Plan:  - Continue with nonpharmacologic treatments including exercise.  - Stop amlodipine 2.5 mg daily  - Start Olmesartan 5mg daily  - OK to take Lasix 20mg as needed.   - FU 3 months     Orders:  -     olmesartan (BENICAR) 5 MG tablet; Take 1 tablet by mouth Daily.  Dispense: 90 tablet; Refill: 1  -     Comprehensive Metabolic Panel; Future  -     CBC & Differential; Future  -     Lipid Panel; Future    2. Elevated lipoprotein(a)  -     rosuvastatin (CRESTOR) 5 MG tablet; Take 1 tablet by mouth Every Night.  Dispense: 90 tablet; Refill: 1  -     Comprehensive Metabolic Panel; Future  -     CBC & Differential; Future  -     Lipid Panel; Future    3. Acute  pain of right knee  -     Ibuprofen 3 %, Gabapentin 10 %, Baclofen 2 %, lidocaine 4 %, Ketamine HCl 4 %; Apply 1-2 g topically to the appropriate area as directed 3 (Three) to 4 (Four) times daily.  Dispense: 90 g; Refill: 5  -     Ambulatory Referral to Physical Therapy for Evaluation & Treatment    4. Impaired fasting glucose  -     Hemoglobin A1c; Future    5. Hypovitaminosis D  -     Vitamin D,25-Hydroxy; Future    Other orders  -     omeprazole (priLOSEC) 20 MG capsule; Take 1 capsule by mouth Daily.  Dispense: 90 capsule; Refill: 3      Treat acute knee pain as above, start PT follow-up in 3 months if not improving       Follow Up   Return in about 3 months (around 3/12/2025), or if symptoms worsen or fail to improve, for Medicare Wellness with fasting labs prior (OK to overbook).  Patient was given instructions and counseling regarding her condition or for health maintenance advice. Please see specific information pulled into the AVS if appropriate.

## 2025-03-10 ENCOUNTER — HOSPITAL ENCOUNTER (OUTPATIENT)
Facility: HOSPITAL | Age: 75
Setting detail: HOSPITAL OUTPATIENT SURGERY
Discharge: HOME | End: 2025-03-10
Attending: INTERNAL MEDICINE | Admitting: INTERNAL MEDICINE
Payer: MEDICARE

## 2025-03-10 ENCOUNTER — ANESTHESIA (OUTPATIENT)
Dept: GASTROENTEROLOGY | Facility: HOSPITAL | Age: 75
End: 2025-03-10
Payer: MEDICARE

## 2025-03-10 ENCOUNTER — ANESTHESIA EVENT (OUTPATIENT)
Dept: GASTROENTEROLOGY | Facility: HOSPITAL | Age: 75
End: 2025-03-10
Payer: MEDICARE

## 2025-03-10 VITALS
WEIGHT: 139.2 LBS | DIASTOLIC BLOOD PRESSURE: 73 MMHG | RESPIRATION RATE: 16 BRPM | OXYGEN SATURATION: 100 % | HEART RATE: 64 BPM | SYSTOLIC BLOOD PRESSURE: 143 MMHG | BODY MASS INDEX: 23.76 KG/M2 | HEIGHT: 64 IN

## 2025-03-10 DIAGNOSIS — K22.70 BARRETT'S ESOPHAGUS WITHOUT DYSPLASIA: ICD-10-CM

## 2025-03-10 DIAGNOSIS — Z12.11 ENCOUNTER FOR SCREENING FOR MALIGNANT NEOPLASM OF COLON: ICD-10-CM

## 2025-03-10 PROCEDURE — 88305 TISSUE EXAM BY PATHOLOGIST: CPT | Performed by: INTERNAL MEDICINE

## 2025-03-10 PROCEDURE — 25010000002 LIDOCAINE 2% SOLUTION: Performed by: NURSE ANESTHETIST, CERTIFIED REGISTERED

## 2025-03-10 PROCEDURE — 25810000003 LACTATED RINGERS PER 1000 ML: Performed by: NURSE ANESTHETIST, CERTIFIED REGISTERED

## 2025-03-10 PROCEDURE — 25810000003 LACTATED RINGERS PER 1000 ML: Performed by: INTERNAL MEDICINE

## 2025-03-10 PROCEDURE — 25010000002 PROPOFOL 1000 MG/100ML EMULSION: Performed by: NURSE ANESTHETIST, CERTIFIED REGISTERED

## 2025-03-10 PROCEDURE — 45380 COLONOSCOPY AND BIOPSY: CPT | Performed by: INTERNAL MEDICINE

## 2025-03-10 PROCEDURE — 43239 EGD BIOPSY SINGLE/MULTIPLE: CPT | Performed by: INTERNAL MEDICINE

## 2025-03-10 RX ORDER — LIDOCAINE HYDROCHLORIDE 20 MG/ML
INJECTION, SOLUTION INFILTRATION; PERINEURAL AS NEEDED
Status: DISCONTINUED | OUTPATIENT
Start: 2025-03-10 | End: 2025-03-10 | Stop reason: SURG

## 2025-03-10 RX ORDER — SODIUM CHLORIDE 0.9 % (FLUSH) 0.9 %
10 SYRINGE (ML) INJECTION AS NEEDED
Status: DISCONTINUED | OUTPATIENT
Start: 2025-03-10 | End: 2025-03-10 | Stop reason: HOSPADM

## 2025-03-10 RX ORDER — SODIUM CHLORIDE, SODIUM LACTATE, POTASSIUM CHLORIDE, CALCIUM CHLORIDE 600; 310; 30; 20 MG/100ML; MG/100ML; MG/100ML; MG/100ML
1000 INJECTION, SOLUTION INTRAVENOUS CONTINUOUS
Status: DISCONTINUED | OUTPATIENT
Start: 2025-03-10 | End: 2025-03-10 | Stop reason: HOSPADM

## 2025-03-10 RX ORDER — LIDOCAINE HYDROCHLORIDE 10 MG/ML
0.5 INJECTION, SOLUTION INFILTRATION; PERINEURAL ONCE AS NEEDED
Status: DISCONTINUED | OUTPATIENT
Start: 2025-03-10 | End: 2025-03-10 | Stop reason: HOSPADM

## 2025-03-10 RX ORDER — SODIUM CHLORIDE, SODIUM LACTATE, POTASSIUM CHLORIDE, CALCIUM CHLORIDE 600; 310; 30; 20 MG/100ML; MG/100ML; MG/100ML; MG/100ML
INJECTION, SOLUTION INTRAVENOUS CONTINUOUS PRN
Status: DISCONTINUED | OUTPATIENT
Start: 2025-03-10 | End: 2025-03-10 | Stop reason: SURG

## 2025-03-10 RX ORDER — PROPOFOL 10 MG/ML
INJECTION, EMULSION INTRAVENOUS AS NEEDED
Status: DISCONTINUED | OUTPATIENT
Start: 2025-03-10 | End: 2025-03-10 | Stop reason: SURG

## 2025-03-10 RX ADMIN — PROPOFOL INJECTABLE EMULSION 30 MG: 10 INJECTION, EMULSION INTRAVENOUS at 09:36

## 2025-03-10 RX ADMIN — PROPOFOL INJECTABLE EMULSION 20 MG: 10 INJECTION, EMULSION INTRAVENOUS at 09:32

## 2025-03-10 RX ADMIN — PROPOFOL INJECTABLE EMULSION 10 MG: 10 INJECTION, EMULSION INTRAVENOUS at 09:34

## 2025-03-10 RX ADMIN — PROPOFOL INJECTABLE EMULSION 20 MG: 10 INJECTION, EMULSION INTRAVENOUS at 09:46

## 2025-03-10 RX ADMIN — PROPOFOL INJECTABLE EMULSION 20 MG: 10 INJECTION, EMULSION INTRAVENOUS at 09:44

## 2025-03-10 RX ADMIN — PROPOFOL INJECTABLE EMULSION 20 MG: 10 INJECTION, EMULSION INTRAVENOUS at 09:42

## 2025-03-10 RX ADMIN — PROPOFOL INJECTABLE EMULSION 20 MG: 10 INJECTION, EMULSION INTRAVENOUS at 09:40

## 2025-03-10 RX ADMIN — SODIUM CHLORIDE, SODIUM LACTATE, POTASSIUM CHLORIDE, AND CALCIUM CHLORIDE 1000 ML: .6; .31; .03; .02 INJECTION, SOLUTION INTRAVENOUS at 09:07

## 2025-03-10 RX ADMIN — PROPOFOL INJECTABLE EMULSION 80 MG: 10 INJECTION, EMULSION INTRAVENOUS at 09:30

## 2025-03-10 RX ADMIN — PROPOFOL INJECTABLE EMULSION 20 MG: 10 INJECTION, EMULSION INTRAVENOUS at 09:48

## 2025-03-10 RX ADMIN — PROPOFOL INJECTABLE EMULSION 20 MG: 10 INJECTION, EMULSION INTRAVENOUS at 09:38

## 2025-03-10 RX ADMIN — SODIUM CHLORIDE, POTASSIUM CHLORIDE, SODIUM LACTATE AND CALCIUM CHLORIDE: 600; 310; 30; 20 INJECTION, SOLUTION INTRAVENOUS at 09:24

## 2025-03-10 RX ADMIN — LIDOCAINE HYDROCHLORIDE 100 MG: 20 INJECTION, SOLUTION INFILTRATION; PERINEURAL at 09:30

## 2025-03-10 NOTE — ANESTHESIA POSTPROCEDURE EVALUATION
Patient: Snow Anderson    Procedure Summary       Date: 03/10/25 Room / Location: [facility] ENDOSCOPY 10 / [facility] ENDOSCOPY    Anesthesia Start: 0924 Anesthesia Stop: 0956    Procedures:       COLONOSCOPY      ESOPHAGOGASTRODUODENOSCOPY (Esophagus) Diagnosis:       Owens's esophagus without dysplasia      Encounter for screening for malignant neoplasm of colon      (Owens's esophagus without dysplasia [K22.70])      (Encounter for screening for malignant neoplasm of colon [Z12.11])    Surgeons: Nelson Bustos MD Provider: Kamron Mittal MD    Anesthesia Type: MAC ASA Status: 2            Anesthesia Type: MAC    Vitals  Vitals Value Taken Time   /73 03/10/25 10:15   Temp     Pulse 64 03/10/25 10:15   Resp 16 03/10/25 10:15   SpO2 100 % 03/10/25 10:15           Post Anesthesia Care and Evaluation    Patient location during evaluation: PACU  Patient participation: complete - patient participated  Level of consciousness: awake and alert  Pain management: adequate    Airway patency: patent  Anesthetic complications: No anesthetic complications    Cardiovascular status: acceptable  Respiratory status: acceptable  Hydration status: acceptable    Comments: --------------------            03/10/25               1015     --------------------   BP:       143/73     Pulse:      64       Resp:       16       SpO2:      100%     --------------------

## 2025-03-10 NOTE — ANESTHESIA PREPROCEDURE EVALUATION
Anesthesia Evaluation     history of anesthetic complications:  PONV               Airway   Mallampati: III  TM distance: <3 FB  Dental      Pulmonary    (+) asthma,  Cardiovascular     Rhythm: regular    (+) hypertension, valvular problems/murmurs (Normal valve structure & function), hyperlipidemia      Neuro/Psych  (+) numbness, psychiatric history Depression  GI/Hepatic/Renal/Endo    (+) renal disease- CRI, thyroid problem hypothyroidism    Musculoskeletal     Abdominal    Substance History   (+) alcohol use     OB/GYN          Other   arthritis,                   Anesthesia Plan    ASA 2     MAC     intravenous induction     Anesthetic plan, risks, benefits, and alternatives have been provided, discussed and informed consent has been obtained with: patient.    CODE STATUS:

## 2025-03-10 NOTE — DISCHARGE INSTRUCTIONS
For the next 24 hours patient needs to be with a responsible adult.    For 24 hours DO NOT drive, operate machinery, appliances, drink alcohol, make important decisions or sign legal documents.    Start with a light or bland diet if you are feeling sick to your stomach otherwise advance to regular diet as tolerated.    Follow recommendations on procedure report if provided by your doctor.    Call Dr Bustos for problems [phone]    Problems may include but not limited to: large amounts of bleeding, trouble breathing, repeated vomiting, severe unrelieved pain, fever or chills.

## 2025-03-10 NOTE — H&P
[facility]  Pre Procedure History & Physical    Chief Complaint:   Barretts  Screening for colon cancer    Subjective     HPI:   74 y.o. female here for EGD/colon for above issues    Past Medical History:   Past Medical History:   Diagnosis Date    Abnormal kidney function     Allergic 1970s    Penecillin; sulfa; sinus related allergies    Allergic rhinitis     Arthritis     Asthma 09/2019    allergy induced    Atherosclerosis of both carotid arteries     Atrophic vaginitis     Owens esophagus 2022    Bell's palsy     Cataract couple of years    noted at eye exam. very mild. no action needed at this time.    Chronic kidney disease     Cystitis 03/2018    Cystocele with rectocele 01/2016    Diverticulosis     Dysuria     Enterocele 01/2016    GERD (gastroesophageal reflux disease) 2021    Mild. Taking pepsid. Controlled.    Hypertension     IFG (impaired fasting glucose)     Low back pain 1980s    under control    Medicare annual wellness visit, subsequent 04/02/2016    Mitral valve prolapse     Multiple thyroid nodules 2017    BILATERAL    Osteoporosis     Other hyperlipidemia 06/17/2021    Plantar fasciitis     Plantar wart 10/2016    RIGHT FOOT    PONV (postoperative nausea and vomiting)     Seborrheic keratosis     Sensorineural hearing loss     BILATERAL    Umbilical hernia 02/04/2016    Urge incontinence     Vitamin D deficiency        Past Surgical History:  Past Surgical History:   Procedure Laterality Date    ANTERIOR AND POSTERIOR VAGINAL REPAIR N/A 01/13/2016    WITH ABDOMINAL ENTEROCELE REPAIR, SACROSPINOUS LIGAMENT FIXATION, AND CYSTOURETHROSCOPY, DR. JAROCHO MORRIS AT [facility]    COLONOSCOPY N/A 06/18/2015    MULTIPLE SMALL AND LARGE SEVERE DIVERTICULA, EVIDENCE OF AN IMPACTED DIVERTICULUM WITHOUT BLEEDING, RESCOPE IN 5-10 YRS, DR. LIZBETH WRAY AT [facility]    DILATATION AND CURETTAGE N/A 11/20/2007    REMOVAL OF CERVICAL POLYP, DR. SRAVANTHI CARRERO AT [facility]    DILATATION AND CURETTAGE N/A      ENDOMETRIAL ABLATION N/A     ENDOSCOPY N/A 2022    Procedure: ESOPHAGOGASTRODUODENOSCOPY WITH BIOPSIES;  Surgeon: Angela Lam MD;  Location: [facility] ENDOSCOPY;  Service: Gastroenterology;  Laterality: N/A;  pre: heartburn, noncardiac chest pain  post: hiatal hernia, irregular Z line, probable Owens's: salmon colored mucosa 35-37cm, gastritis    HERNIA REPAIR  2016    with hysterectomy    HYSTERECTOMY N/A 2016    LAPAROSCOPIC HYSTERECTOMY WITH BSO, DR. JAROCHO MORRIS AT [place]    SINOSCOPY PROCEDURE Left     baloon sinoplasty    TUBAL ABDOMINAL LIGATION  1986    UMBILICAL HERNIA REPAIR      repaired with hysterectomy     UPPER GASTROINTESTINAL ENDOSCOPY  2022       Family History:  Family History   Problem Relation Age of Onset    Stroke Mother         Massive CVA;     Hypertension Mother     Hyperlipidemia Mother     COPD Father     Lung cancer Father     Hearing loss Father     Throat cancer Father         Under the tongue     Heart attack Father     Cancer Father         lung cancer    Heart disease Father     Alcohol abuse Brother     Atrial fibrillation Sister     No Known Problems Maternal Grandmother     No Known Problems Maternal Grandfather     Diabetes Paternal Grandmother         in old age    No Known Problems Paternal Grandfather     Alcohol abuse Brother     Alcohol abuse Brother             Alcohol abuse Brother     Breast cancer Neg Hx     Colon cancer Neg Hx        Social History:   reports that she has never smoked. She has never been exposed to tobacco smoke. She has never used smokeless tobacco. She reports current alcohol use of about 1.0 standard drink of alcohol per week. She reports that she does not use drugs.    Medications:   Medications Prior to Admission   Medication Sig Dispense Refill Last Dose/Taking    albuterol sulfate  (90 Base) MCG/ACT inhaler Every 6 (Six) Hours As Needed.       aspirin 81 MG EC tablet Take 1 tablet by  mouth Daily. Indications: Buildup of Plaques in Large Arteries Leading to the Brain       Azelastine HCl 137 MCG/SPRAY solution As Needed.       Biotin 1 MG capsule Daily.       Cholecalciferol (VITAMIN D) 1000 UNITS tablet Take 2 tablets by mouth.       doxepin (SINEquan) 10 MG capsule TAKE 1 CAPSULE BY MOUTH ONCE DAILY AT NIGHT 30 capsule 0     EPINEPHrine (EPIPEN JR) 0.15 MG/0.3ML solution auto-injector injection As Needed.       Estriol powder USING APPLICATOR, INSERT 0.5 GRAM INTO VAGINA 2-3 TIMES A WEEK AT BEDTIME       furosemide (Lasix) 20 MG tablet Take 1 tablet by mouth Daily As Needed (swelling). With potassium (Patient not taking: Reported on 12/12/2024) 30 tablet 2     hydrocortisone 2.5 % cream As Needed.       Ibuprofen 3 %, Gabapentin 10 %, Baclofen 2 %, lidocaine 4 %, Ketamine HCl 4 % Apply 1-2 g topically to the appropriate area as directed 3 (Three) to 4 (Four) times daily. 90 g 5     ipratropium (ATROVENT) 0.06 % nasal spray USE 2 SPRAY(S) IN EACH NOSTRIL 3 TO 4 TIMES DAILY       ipratropium-albuterol (DUO-NEB) 0.5-2.5 mg/3 ml nebulizer As Needed.       Multiple Vitamin (MULTI VITAMIN DAILY PO) Take  by mouth.       olmesartan (BENICAR) 5 MG tablet Take 1 tablet by mouth Daily. 90 tablet 1     omeprazole (priLOSEC) 20 MG capsule Take 1 capsule by mouth Daily. 90 capsule 3     Phenylephrine-Chlorphen-DM (ED-A-HIST DM PO) Take  by mouth.       potassium chloride (KLOR-CON M20) 20 MEQ CR tablet Take 1 tablet by mouth once daily 90 tablet 0     rosuvastatin (CRESTOR) 5 MG tablet Take 1 tablet by mouth Every Night. 90 tablet 1     Trelegy Ellipta 100-62.5-25 MCG/ACT inhaler        Triamcinolone Acetonide (NASACORT) 55 MCG/ACT nasal inhaler Administer 2 sprays into the nostril(s) as directed by provider Daily.          Allergies:  Sulfa antibiotics, Sodium lauryl sulfate, and Penicillins    ROS:    Pertinent items are noted in HPI     Objective     Blood pressure 152/70, pulse 73, resp. rate 16, height  "162.6 cm (64\"), weight 63.1 kg (139 lb 3.2 oz), SpO2 99%.    Physical Exam   Constitutional: Pt is oriented to person, place, and time and well-developed, well-nourished, and in no distress.   Mouth/Throat: Oropharynx is clear and moist.   Neck: Normal range of motion.   Cardiovascular: Normal rate, regular rhythm and normal heart sounds.    Pulmonary/Chest: Effort normal and breath sounds normal.   Abdominal: Soft. Nontender  Skin: Skin is warm and dry.   Psychiatric: Mood, memory, affect and judgment normal.     Assessment & Plan     Diagnosis:  Barretts  Screening for colon cancer    Anticipated Surgical Procedure:  EGD  Colonoscopy    The risks, benefits, and alternatives of this procedure have been discussed with the patient or the responsible party- the patient understands and agrees to proceed.                                                            "

## 2025-03-11 LAB
CYTO UR: NORMAL
LAB AP CASE REPORT: NORMAL
PATH REPORT.FINAL DX SPEC: NORMAL
PATH REPORT.GROSS SPEC: NORMAL

## 2025-05-01 ENCOUNTER — OFFICE VISIT (OUTPATIENT)
Dept: FAMILY MEDICINE CLINIC | Facility: CLINIC | Age: 75
End: 2025-05-01
Payer: MEDICARE

## 2025-05-01 VITALS
SYSTOLIC BLOOD PRESSURE: 144 MMHG | HEIGHT: 64 IN | WEIGHT: 140 LBS | DIASTOLIC BLOOD PRESSURE: 72 MMHG | TEMPERATURE: 98.2 F | BODY MASS INDEX: 23.9 KG/M2 | OXYGEN SATURATION: 100 % | HEART RATE: 65 BPM

## 2025-05-01 DIAGNOSIS — Z00.00 MEDICARE ANNUAL WELLNESS VISIT, SUBSEQUENT: Primary | ICD-10-CM

## 2025-05-01 DIAGNOSIS — I10 PRIMARY HYPERTENSION: ICD-10-CM

## 2025-05-01 DIAGNOSIS — J41.8 MIXED SIMPLE AND MUCOPURULENT CHRONIC BRONCHITIS: ICD-10-CM

## 2025-05-01 DIAGNOSIS — F51.01 PRIMARY INSOMNIA: ICD-10-CM

## 2025-05-01 DIAGNOSIS — R73.01 IMPAIRED FASTING GLUCOSE: ICD-10-CM

## 2025-05-01 RX ORDER — DOXEPIN HYDROCHLORIDE 10 MG/1
10 CAPSULE ORAL NIGHTLY
Qty: 30 CAPSULE | Refills: 3 | Status: SHIPPED | OUTPATIENT
Start: 2025-05-01

## 2025-05-01 RX ORDER — OLMESARTAN MEDOXOMIL 5 MG/1
5 TABLET ORAL DAILY
Qty: 100 TABLET | Refills: 1 | Status: SHIPPED | OUTPATIENT
Start: 2025-05-01

## 2025-05-01 NOTE — PROGRESS NOTES
Subjective   The ABCs of the Annual Wellness Visit  Medicare Wellness Visit      Snow Anderson is a 74 y.o. patient who presents for a Medicare Wellness Visit.    The following portions of the patient's history were reviewed and   updated as appropriate: allergies, current medications, past family history, past medical history, past social history, past surgical history, and problem list.    Compared to one year ago, the patient's physical   health is better.  Compared to one year ago, the patient's mental   health is the same.    Recent Hospitalizations:  She was not admitted to the hospital during the last year.     Current Medical Providers:  Patient Care Team:  Lilo Hernandez MD as PCP - General (Family Medicine)    Outpatient Medications Prior to Visit   Medication Sig Dispense Refill    albuterol sulfate  (90 Base) MCG/ACT inhaler Every 6 (Six) Hours As Needed.      aspirin 81 MG EC tablet Take 1 tablet by mouth Daily. Indications: Buildup of Plaques in Large Arteries Leading to the Brain      Azelastine HCl 137 MCG/SPRAY solution As Needed.      Biotin 1 MG capsule Daily.      Cholecalciferol (VITAMIN D) 1000 UNITS tablet Take 2 tablets by mouth.      EPINEPHrine (EPIPEN JR) 0.15 MG/0.3ML solution auto-injector injection As Needed.      Estriol powder USING APPLICATOR, INSERT 0.5 GRAM INTO VAGINA 2-3 TIMES A WEEK AT BEDTIME      hydrocortisone 2.5 % cream As Needed.      ipratropium (ATROVENT) 0.06 % nasal spray USE 2 SPRAY(S) IN EACH NOSTRIL 3 TO 4 TIMES DAILY      ipratropium-albuterol (DUO-NEB) 0.5-2.5 mg/3 ml nebulizer As Needed.      Multiple Vitamin (MULTI VITAMIN DAILY PO) Take  by mouth.      omeprazole (priLOSEC) 20 MG capsule Take 1 capsule by mouth Daily. 90 capsule 3    Phenylephrine-Chlorphen-DM (ED-A-HIST DM PO) Take  by mouth.      rosuvastatin (CRESTOR) 5 MG tablet Take 1 tablet by mouth Every Night. 90 tablet 1    Trelegy Ellipta 100-62.5-25 MCG/ACT inhaler       Triamcinolone  Acetonide (NASACORT) 55 MCG/ACT nasal inhaler Administer 2 sprays into the nostril(s) as directed by provider Daily.      doxepin (SINEquan) 10 MG capsule TAKE 1 CAPSULE BY MOUTH ONCE DAILY AT NIGHT 30 capsule 0    olmesartan (BENICAR) 5 MG tablet Take 1 tablet by mouth Daily. 90 tablet 1    furosemide (Lasix) 20 MG tablet Take 1 tablet by mouth Daily As Needed (swelling). With potassium (Patient not taking: Reported on 5/1/2025) 30 tablet 2    Ibuprofen 3 %, Gabapentin 10 %, Baclofen 2 %, lidocaine 4 %, Ketamine HCl 4 % Apply 1-2 g topically to the appropriate area as directed 3 (Three) to 4 (Four) times daily. (Patient not taking: Reported on 5/1/2025) 90 g 5    potassium chloride (KLOR-CON M20) 20 MEQ CR tablet Take 1 tablet by mouth once daily (Patient not taking: Reported on 5/1/2025) 90 tablet 0     No facility-administered medications prior to visit.     No opioid medication identified on active medication list. I have reviewed chart for other potential  high risk medication/s and harmful drug interactions in the elderly.      Aspirin is on active medication list. Aspirin use is indicated based on review of current medical condition/s. Pros and cons of this therapy have been discussed today. Benefits of this medication outweigh potential harm.  Patient has been encouraged to continue taking this medication.  .      Patient Active Problem List   Diagnosis    Atopic rhinitis    Carotid atherosclerosis    Primary hypertension    Impaired fasting glucose    Hypovitaminosis D    Medicare annual wellness visit, subsequent    Diverticulosis of large intestine without hemorrhage    Multiple thyroid nodules    Other hyperlipidemia    Anxiety    Nuclear cataract    Allergies    Non-cardiac chest pain    Chronic cough    Heartburn    Owens's esophagus without dysplasia    Capsular cataract of right eye    Environmental allergies    Moderate persistent asthma without complication    Encounter for screening for  "malignant neoplasm of colon     Advance Care Planning Advance Directive is on file.  ACP discussion was held with the patient during this visit. Patient has an advance directive in EMR which is still valid.             Objective   Vitals:    25 1032   BP: 144/72   Pulse: 65   Temp: 98.2 °F (36.8 °C)   SpO2: 100%   Weight: 63.5 kg (140 lb)   Height: 162.6 cm (64\")   PainSc: 0-No pain       Estimated body mass index is 24.03 kg/m² as calculated from the following:    Height as of this encounter: 162.6 cm (64\").    Weight as of this encounter: 63.5 kg (140 lb).    BMI is within normal parameters. No other follow-up for BMI required.           Does the patient have evidence of cognitive impairment? Yes, mild, frustrating but then does remember  Lab Results   Component Value Date    CHLPL 137 2025    TRIG 59 2025    HDL 74 (H) 2025    LDL 50 2025    VLDL 13 2025    HGBA1C 5.80 (H) 2025                                                                                                Health  Risk Assessment    Smoking Status:  Social History     Tobacco Use   Smoking Status Never    Passive exposure: Never   Smokeless Tobacco Never     Alcohol Consumption:  Social History     Substance and Sexual Activity   Alcohol Use Yes    Alcohol/week: 2.0 standard drinks of alcohol    Types: 1 Glasses of wine, 1 Cans of beer per week    Comment: rare       Fall Risk Screen  STEADI Fall Risk Assessment was completed, and patient is at LOW risk for falls.Assessment completed on:2025    Depression Screening   Little interest or pleasure in doing things? Not at all   Feeling down, depressed, or hopeless? Not at all   PHQ-2 Total Score 0      Health Habits and Functional and Cognitive Screenin/1/2025    10:36 AM   Functional & Cognitive Status   Do you have difficulty preparing food and eating? No   Do you have difficulty bathing yourself, getting dressed or grooming yourself? No   Do you " have difficulty using the toilet? No   Do you have difficulty moving around from place to place? No   Do you have trouble with steps or getting out of a bed or a chair? No   Current Diet Well Balanced Diet   Dental Exam Up to date   Eye Exam Up to date   Exercise (times per week) 5 times per week   Current Exercises Include Treadmill;Walking   Do you need help using the phone?  No   Are you deaf or do you have serious difficulty hearing?  No   Do you need help to go to places out of walking distance? No   Do you need help shopping? No   Do you need help preparing meals?  No   Do you need help with housework?  No   Do you need help with laundry? No   Do you need help taking your medications? No   Do you need help managing money? No   Do you ever drive or ride in a car without wearing a seat belt? No   Have you felt unusual stress, anger or loneliness in the last month? No   Who do you live with? Spouse   If you need help, do you have trouble finding someone available to you? No   Do you have difficulty concentrating, remembering or making decisions? Yes           Age-appropriate Screening Schedule:  Refer to the list below for future screening recommendations based on patient's age, sex and/or medical conditions. Orders for these recommended tests are listed in the plan section. The patient has been provided with a written plan.    Health Maintenance List  Health Maintenance   Topic Date Due    COVID-19 Vaccine (7 - 2024-25 season) 03/30/2025    INFLUENZA VACCINE  07/01/2025    DXA SCAN  09/06/2025    LIPID PANEL  04/23/2026    ANNUAL WELLNESS VISIT  05/01/2026    GASTROSCOPY (EGD)  03/10/2028    COLORECTAL CANCER SCREENING  03/10/2030    TDAP/TD VACCINES (3 - Td or Tdap) 04/25/2030    HEPATITIS C SCREENING  Completed    RSV Vaccine - Adults  Completed    Pneumococcal Vaccine 50+  Completed    ZOSTER VACCINE  Completed    MAMMOGRAM  Discontinued                                                                                                                                                 CMS Preventative Services Quick Reference  Risk Factors Identified During Encounter  Hearing Problem:  getting new hearing aids     The above risks/problems have been discussed with the patient.  Pertinent information has been shared with the patient in the After Visit Summary.  An After Visit Summary and PPPS were made available to the patient.    Follow Up:   Next Medicare Wellness visit to be scheduled in 1 year.         Additional E&M Note during same encounter follows:  Patient has additional, significant, and separately identifiable condition(s)/problem(s) that require work above and beyond the Medicare Wellness Visit     Chief Complaint  Medicare Wellness-subsequent (No complains doing well )    Subjective    HPI  Malissa is also being seen today for an annual adult preventative physical exam.  and Malissa is also being seen today for additional medical problem/s.       The patient presents for a Medicare wellness visit and follow-up on blood pressure.    Blood Pressure Management  She reports a positive response to olmesartan 5 mg, with no adverse reactions. BP readings are predominantly in the 110s-120s (>50% of the time), 130s (30%), and occasionally 140s. BP elevates to 150s with knee pain. Diastolic readings are consistently <60, decreased from 70s-80s. She is currently on olmesartan 5 mg.  - Onset: BP elevates to 150s with knee pain.  - Location: Blood pressure readings.  - Character: BP readings predominantly in the 110s-120s (>50% of the time), 130s (30%), occasionally 140s, and elevates to 150s with knee pain. Diastolic readings consistently <60, decreased from 70s-80s.  - Alleviating/Aggravating Factors: Positive response to olmesartan 5 mg with no adverse reactions.    Fatigue  She has increased fatigue over the past few weeks, attributed to allergies, which have improved over the past two days.  - Onset: Over the past few weeks.  -  Duration: Past few weeks.  - Character: Increased fatigue.  - Alleviating/Aggravating Factors: Attributed to allergies, which have improved over the past two days.    Knee Condition and Cardiovascular Exercise  She has incorporated more cardiovascular exercises due to her knee condition, previously limited to toning resistance weight exercises. She inquires about the optimal target heart rate for treadmill workouts. Currently, she uses a 3-bar incline for 20 minutes, with a peak heart rate of ~120. She believes her physical health has improved compared to last year, with no significant allergy issues since before Christmas. No hospitalization within the past year. Not taking baby aspirin. Recently underwent a root canal and is scheduled for a new crown. Needs new hearing aids but has delayed purchase. Difficulty focusing on conversations in noisy environments. Currently on doxepin and requests a refill.  - Onset: Knee condition improved following physical therapy completed in March 2025.  - Location: Knee.  - Duration: Ongoing improvement following physical therapy.  - Character: Incorporating more cardiovascular exercises, previously limited to toning resistance weight exercises.  - Alleviating/Aggravating Factors: Physical therapy completed in March 2025.  - Timing: Uses a 3-bar incline for 20 minutes, with a peak heart rate of ~120.  - Severity: Believes physical health has improved compared to last year.    Water Intake Challenges  She is making efforts to increase water intake but struggles to maintain this while ensuring uninterrupted sleep, often waking up twice during the night to use the bathroom. She attempts to consume most of her water in the morning and afternoon, finishing by 8:00 PM.  - Onset: Ongoing.  - Character: Efforts to increase water intake but struggles to maintain uninterrupted sleep.  - Timing: Consumes most of her water in the morning and afternoon, finishing by 8:00 PM.    COPD  "Treatment  She continues treatment for COPD and reports feeling better overall, with occasional flare-ups.  - Character: Feeling better overall, with occasional flare-ups.    A1c Levels  Her A1c levels have shown slight improvement, attributed to increased cardiovascular exercise.  - Character: Slight improvement in A1c levels.  - Alleviating/Aggravating Factors: Increased cardiovascular exercise.    Supplemental Information  Her knee condition has improved following physical therapy completed in March 2025. She continues prescribed exercises.    MEDICATIONS  Olmesartan, doxepin.          Objective   Vital Signs:  /72   Pulse 65   Temp 98.2 °F (36.8 °C)   Ht 162.6 cm (64\")   Wt 63.5 kg (140 lb)   SpO2 100%   BMI 24.03 kg/m²   Physical Exam  Vitals and nursing note reviewed.   Constitutional:       General: She is not in acute distress.     Appearance: Normal appearance. She is well-developed.   HENT:      Head: Normocephalic.      Right Ear: Tympanic membrane and ear canal normal. There is no impacted cerumen.      Left Ear: Tympanic membrane and ear canal normal. There is no impacted cerumen.      Nose: Nose normal.   Eyes:      Conjunctiva/sclera: Conjunctivae normal.      Pupils: Pupils are equal, round, and reactive to light.   Neck:      Vascular: No carotid bruit.   Cardiovascular:      Rate and Rhythm: Normal rate and regular rhythm.      Heart sounds: Normal heart sounds. No murmur heard.  Pulmonary:      Effort: Pulmonary effort is normal. No respiratory distress.      Breath sounds: Normal breath sounds.   Abdominal:      General: Abdomen is flat. Bowel sounds are normal. There is no distension.      Tenderness: There is no abdominal tenderness.   Musculoskeletal:         General: Normal range of motion.   Skin:     General: Skin is warm and dry.      Findings: No rash.   Neurological:      Mental Status: She is alert and oriented to person, place, and time.   Psychiatric:         Behavior: " Behavior normal.         Thought Content: Thought content normal.         Judgment: Judgment normal.           Lungs auscultated. Abdomen examined. Ankles examined.    The following data was reviewed by: Lilo Hernandez MD on 05/01/2025:      Vitamin D,25-Hydroxy (04/23/2025 09:26)  Hemoglobin A1c (04/23/2025 09:26)  Lipid Panel (04/23/2025 09:26)  CBC & Differential (04/23/2025 09:26)  Comprehensive Metabolic Panel (04/23/2025 09:26)  Results  Laboratory Studies  Kidney function decreased by ~10%. Electrolytes, liver enzymes, protein levels, blood counts, platelet counts, cholesterol numbers, and vitamin D levels are normal. RDW indicates cells are more symmetric. A1c is stable and slightly better than a year ago.           Assessment and Plan   Diagnoses and all orders for this visit:    1. Medicare annual wellness visit, subsequent (Primary)    2. Primary hypertension  -     olmesartan (BENICAR) 5 MG tablet; Take 1 tablet by mouth Daily. OK to take second tablet if BP is >140/90  Dispense: 100 tablet; Refill: 1  -     Comprehensive Metabolic Panel; Future  -     CBC & Differential; Future    3. Impaired fasting glucose  -     Hemoglobin A1c; Future    4. Mixed simple and mucopurulent chronic bronchitis    5. Primary insomnia  -     doxepin (SINEquan) 10 MG capsule; Take 1 capsule by mouth Every Night.  Dispense: 30 capsule; Refill: 3           1. Hypertension: Chronic problem not well-controlled, blood pressure above goal of 140/90 about 30% of the time with home readings, she is not having any adverse effects to the new medication of olmesartan 5 mg daily.    - Increase olmesartan 5 mg dose, to 1 tablet daily, take a second tablet if BP >140/90  - Follow-up in 5 months    2. Fatigue:  - Increased fatigue over the last few weeks, possibly related to allergies  - Monitor symptoms and report significant changes    3. Hearing loss:  - Needs new hearing aids  - Plans to update after evaluating 's new hearing  aids from [employer]    4. Health maintenance:  - Continue current exercise regimen, focusing on interval training and weightlifting to improve heart conditioning and insulin resistance  - Order comprehensive blood work panel during next visit, including kidney function, liver enzymes, blood counts, and A1c levels    5. Medication management:  - Send doxepin prescription to [facility]    6. Chronic obstructive pulmonary disease:  - Continues treatment for COPD  - Reports feeling better overall, with occasional flare-ups  - Still seeing pulmonology    7.  Hyperglycemia  - A1c levels show slight improvement, attributed to increased cardiovascular exercise    Follow-up:  - Follow up in 5 months    PROCEDURE  Recently underwent a root canal and is scheduled for a new crown.         Follow Up   Return in about 5 months (around 10/1/2025), or if symptoms worsen or fail to improve, for Recheck HTN with labs prior.  Patient was given instructions and counseling regarding her condition or for health maintenance advice. Please see specific information pulled into the AVS if appropriate.  Patient or patient representative verbalized consent for the use of Ambient Listening during the visit with  Lilo Hernandez MD for chart documentation. 5/1/2025  11:19 EDT

## 2025-05-26 DIAGNOSIS — I10 PRIMARY HYPERTENSION: ICD-10-CM

## 2025-05-26 DIAGNOSIS — E78.41 ELEVATED LIPOPROTEIN(A): ICD-10-CM

## 2025-05-27 RX ORDER — ROSUVASTATIN CALCIUM 5 MG/1
5 TABLET, COATED ORAL NIGHTLY
Qty: 90 TABLET | Refills: 0 | Status: SHIPPED | OUTPATIENT
Start: 2025-05-27

## 2025-05-27 RX ORDER — OLMESARTAN MEDOXOMIL 5 MG/1
5 TABLET ORAL DAILY
Qty: 90 TABLET | Refills: 0 | Status: SHIPPED | OUTPATIENT
Start: 2025-05-27

## 2025-06-06 DIAGNOSIS — E78.41 ELEVATED LIPOPROTEIN(A): ICD-10-CM

## 2025-06-06 RX ORDER — ROSUVASTATIN CALCIUM 5 MG/1
5 TABLET, COATED ORAL NIGHTLY
Qty: 90 TABLET | Refills: 0 | Status: SHIPPED | OUTPATIENT
Start: 2025-06-06

## 2025-07-03 ENCOUNTER — OFFICE VISIT (OUTPATIENT)
Dept: FAMILY MEDICINE CLINIC | Facility: CLINIC | Age: 75
End: 2025-07-03
Payer: MEDICARE

## 2025-07-03 ENCOUNTER — HOSPITAL ENCOUNTER (OUTPATIENT)
Dept: CT IMAGING | Facility: HOSPITAL | Age: 75
Discharge: HOME | End: 2025-07-03
Admitting: NURSE PRACTITIONER
Payer: MEDICARE

## 2025-07-03 ENCOUNTER — TELEPHONE (OUTPATIENT)
Dept: FAMILY MEDICINE CLINIC | Facility: CLINIC | Age: 75
End: 2025-07-03
Payer: MEDICARE

## 2025-07-03 VITALS
TEMPERATURE: 98.4 F | SYSTOLIC BLOOD PRESSURE: 128 MMHG | HEART RATE: 79 BPM | OXYGEN SATURATION: 97 % | DIASTOLIC BLOOD PRESSURE: 82 MMHG | WEIGHT: 142 LBS | HEIGHT: 64 IN | BODY MASS INDEX: 24.24 KG/M2

## 2025-07-03 DIAGNOSIS — R68.83 CHILLS: ICD-10-CM

## 2025-07-03 DIAGNOSIS — K57.90 DIVERTICULOSIS: Primary | ICD-10-CM

## 2025-07-03 DIAGNOSIS — K57.90 DIVERTICULOSIS: ICD-10-CM

## 2025-07-03 DIAGNOSIS — R10.32 CONTINUOUS LLQ ABDOMINAL PAIN: ICD-10-CM

## 2025-07-03 PROCEDURE — 74176 CT ABD & PELVIS W/O CONTRAST: CPT

## 2025-07-03 PROCEDURE — 1160F RVW MEDS BY RX/DR IN RCRD: CPT | Performed by: NURSE PRACTITIONER

## 2025-07-03 PROCEDURE — 1159F MED LIST DOCD IN RCRD: CPT | Performed by: NURSE PRACTITIONER

## 2025-07-03 PROCEDURE — 3079F DIAST BP 80-89 MM HG: CPT | Performed by: NURSE PRACTITIONER

## 2025-07-03 PROCEDURE — 3074F SYST BP LT 130 MM HG: CPT | Performed by: NURSE PRACTITIONER

## 2025-07-03 PROCEDURE — 99213 OFFICE O/P EST LOW 20 MIN: CPT | Performed by: NURSE PRACTITIONER

## 2025-07-03 PROCEDURE — 1125F AMNT PAIN NOTED PAIN PRSNT: CPT | Performed by: NURSE PRACTITIONER

## 2025-07-03 RX ORDER — CIPROFLOXACIN 500 MG/1
500 TABLET, FILM COATED ORAL 2 TIMES DAILY
Qty: 10 TABLET | Refills: 0 | Status: SHIPPED | OUTPATIENT
Start: 2025-07-03 | End: 2025-07-08

## 2025-07-03 RX ORDER — METRONIDAZOLE 500 MG/1
500 TABLET ORAL 2 TIMES DAILY
Qty: 10 TABLET | Refills: 0 | Status: SHIPPED | OUTPATIENT
Start: 2025-07-03 | End: 2025-07-08

## 2025-07-03 RX ORDER — FEXOFENADINE HCL 60 MG/1
60 TABLET, FILM COATED ORAL DAILY
COMMUNITY

## 2025-07-03 NOTE — PROGRESS NOTES
"Chief Complaint  Abdominal Pain (Left lower quadrant  pain  sharp  since  1am today ,doll sometimes hurt almost all the time ,when put pressure is worse   ,no constipation but not  normal bowels  some chill and aches)    Subjective          Snow Anderson presents to [facility]  History of Present Illness    History of Present Illness  The patient presents for left-sided abdominal pain.    She began experiencing a dull, cramping pain on the left side of her abdomen at 1:00 AM this morning, which is sensitive to touch. The pain subsides when she is warm and relaxed, allowing her to sleep. She also reports mild irritation in her intestines, but no diarrhea. She has had minor bowel movements and occasional cramping, but the pain persists. She experienced severe chills last night and had to maintain warmth. Her temperature was 99.9°F this morning, slightly elevated from her usual 97°F. She reports no nausea or vomiting. Her appetite is reduced, as evidenced by her consumption of only a protein bar and a cup of tea this morning. She has been experiencing excessive gas over the past few days. She consumed blanca seeds on Tuesday and is unsure if this has exacerbated her symptoms. She has never had diverticulitis. Her bowel movements have been regular recently, although she has a history of constipation. She supplements her diet with fiber bars and vegetables. She reports no blood in her stool. She has not been exposed to any sick individuals or stomach flu, and she has not traveled recently. The pain is intermittent, occurring during movement but not at rest. She reports no urinary symptoms.       Objective   Vital Signs:   /82   Pulse 79   Temp 98.4 °F (36.9 °C)   Ht 162.6 cm (64.02\")   Wt 64.4 kg (142 lb)   SpO2 97%   BMI 24.36 kg/m²       Physical Exam  Vitals reviewed.   Constitutional:       General: She is not in acute distress.     Appearance: Normal appearance. She is not " ill-appearing, toxic-appearing or diaphoretic.   HENT:      Head: Normocephalic and atraumatic.   Pulmonary:      Effort: Pulmonary effort is normal. No respiratory distress.   Abdominal:      General: Bowel sounds are increased.      Tenderness: There is abdominal tenderness in the left lower quadrant.   Neurological:      General: No focal deficit present.      Mental Status: She is alert and oriented to person, place, and time.      Motor: No weakness.      Gait: Gait normal.   Psychiatric:         Mood and Affect: Mood normal.         Behavior: Behavior normal.          Result Review :                  Results  Imaging   - Colonoscopy: 03/2025, Severe diverticulosis       Assessment and Plan    Diagnoses and all orders for this visit:    1. Diverticulosis (Primary)  -     metroNIDAZOLE (FLAGYL) 500 MG tablet; Take 1 tablet by mouth 2 (Two) Times a Day for 5 days. 1 tab PO BID x 7 days  Dispense: 10 tablet; Refill: 0  -     ciprofloxacin (Cipro) 500 MG tablet; Take 1 tablet by mouth 2 (Two) Times a Day for 5 days.  Dispense: 10 tablet; Refill: 0  -     CT Abdomen Pelvis Without Contrast; Future    2. Continuous LLQ abdominal pain  -     CT Abdomen Pelvis Without Contrast; Future    3. Chills  -     CT Abdomen Pelvis Without Contrast; Future        Assessment & Plan  1. LLQ pain.   - Symptoms include cramping, dull pain localized to the left lower quadrant, soreness to touch, chills, low-grade fever, and increased gas.  - Physical examination reveals a soft abdomen with localized tenderness to LLQ.  - Discussion includes the history of severe diverticulosis from a colonoscopy in 03/2025, recommendation for a CT scan for further evaluation, and advice on dietary modifications to clear liquids followed by low-fiber foods.  - Prescribed a 5-day course of Flagyl and ciprofloxacin, to be taken twice daily, with instructions to avoid alcohol while on Flagyl. If symptoms worsen or fever develops within 48 hours, urgent  medical attention is advised.        Follow Up   Return if symptoms worsen or fail to improve.    Patient was given instructions and counseling regarding her condition or for health maintenance advice. Please see specific information pulled into the AVS if appropriate.       Transcribed from ambient dictation for JANESSA Godwin by JANESSA Godwin.  07/03/25   11:40 EDT    Patient or patient representative verbalized consent for the use of Ambient Listening during the visit with  JANESSA Godwin for chart documentation. 7/3/2025  12:13 EDT    Electronically signed by JANESSA Godwin, 07/03/25, 12:13 PM EDT.

## 2025-07-03 NOTE — TELEPHONE ENCOUNTER
Caller: [facility] [address] [place] [address] ([place]), [place] - [id number] [facility] - [phone]  - [phone]     Relationship to patient: Pharmacy    Best call back number: [phone]    Patient is needing: THEY NEED CLARIFICATION ON WHICH SET OF DIRECTIONS THEY SHOULD USE ON THE metroNIDAZOLE (FLAGYL) 500 MG tablet   PLEASE REACH OUT TO CLARIFY.

## 2025-07-03 NOTE — NURSING NOTE
Patient very tired and wants  to go home. I explained to the patient she may have to come back if the doctor wants to admit her. The patient is agreeable.

## 2025-07-03 NOTE — NURSING NOTE
Pt a stat hold and call. This RN called Oly RIDDLE and left message to call us back at [phone].  Will await call back. Pt already left because she did not want to wait.

## 2025-07-07 ENCOUNTER — TELEPHONE (OUTPATIENT)
Dept: FAMILY MEDICINE CLINIC | Facility: CLINIC | Age: 75
End: 2025-07-07
Payer: MEDICARE

## 2025-07-07 NOTE — TELEPHONE ENCOUNTER
If her symptoms are generally improving and she is feeling better and better no need to see me immediately.  However if she feels that her symptoms are worsening or not improving I would want to see her in the office tomorrow

## 2025-07-07 NOTE — TELEPHONE ENCOUNTER
Timoteo message added below:    -Pain finally went away yesterday - only an occasional aching. I started heavier liquids (ice cream and pudding) and pain did not recur. I am about the same today - occasional ache or soreness. I am moving well. Having growling sounds and gas. Started yogurt this morning.-      Patients symptoms have improved more so, ache/soreness with gas. Patient will finish antibiotic tomorrow. Should patient be seen or more antibiotics be prescribed? What does  recommend?

## 2025-07-08 NOTE — TELEPHONE ENCOUNTER
Spoke with patient this morning, she feels much better this morning. She took her last antibiotic this morning. Patient would like to know if  suggests she take another round of antibiotics, since it took her so long to feel better?

## 2025-07-24 ENCOUNTER — PATIENT MESSAGE (OUTPATIENT)
Dept: FAMILY MEDICINE CLINIC | Facility: CLINIC | Age: 75
End: 2025-07-24
Payer: MEDICARE

## 2025-07-24 DIAGNOSIS — Z12.31 ENCOUNTER FOR SCREENING MAMMOGRAM FOR MALIGNANT NEOPLASM OF BREAST: ICD-10-CM

## 2025-07-24 DIAGNOSIS — Z78.0 POSTMENOPAUSAL: Primary | ICD-10-CM

## 2025-08-04 ENCOUNTER — OFFICE VISIT (OUTPATIENT)
Dept: FAMILY MEDICINE CLINIC | Facility: CLINIC | Age: 75
End: 2025-08-04
Payer: MEDICARE

## 2025-08-04 VITALS
DIASTOLIC BLOOD PRESSURE: 72 MMHG | SYSTOLIC BLOOD PRESSURE: 120 MMHG | BODY MASS INDEX: 24.28 KG/M2 | HEIGHT: 64 IN | OXYGEN SATURATION: 99 % | TEMPERATURE: 98.2 F | WEIGHT: 142.2 LBS | HEART RATE: 58 BPM

## 2025-08-04 DIAGNOSIS — K57.92 DIVERTICULITIS: Primary | ICD-10-CM

## 2025-08-04 DIAGNOSIS — R10.32 LLQ PAIN: ICD-10-CM

## 2025-08-04 PROCEDURE — 1160F RVW MEDS BY RX/DR IN RCRD: CPT | Performed by: NURSE PRACTITIONER

## 2025-08-04 PROCEDURE — 1159F MED LIST DOCD IN RCRD: CPT | Performed by: NURSE PRACTITIONER

## 2025-08-04 PROCEDURE — 3078F DIAST BP <80 MM HG: CPT | Performed by: NURSE PRACTITIONER

## 2025-08-04 PROCEDURE — 3074F SYST BP LT 130 MM HG: CPT | Performed by: NURSE PRACTITIONER

## 2025-08-04 PROCEDURE — 1125F AMNT PAIN NOTED PAIN PRSNT: CPT | Performed by: NURSE PRACTITIONER

## 2025-08-04 PROCEDURE — 99213 OFFICE O/P EST LOW 20 MIN: CPT | Performed by: NURSE PRACTITIONER

## 2025-08-04 RX ORDER — METRONIDAZOLE 500 MG/1
500 TABLET ORAL 2 TIMES DAILY
Qty: 20 TABLET | Refills: 0 | Status: SHIPPED | OUTPATIENT
Start: 2025-08-04 | End: 2025-08-14

## 2025-08-04 RX ORDER — CIPROFLOXACIN 500 MG/1
500 TABLET, FILM COATED ORAL 2 TIMES DAILY
Qty: 20 TABLET | Refills: 0 | Status: SHIPPED | OUTPATIENT
Start: 2025-08-04 | End: 2025-08-14

## 2025-08-05 LAB
ALBUMIN SERPL-MCNC: 4.1 G/DL (ref 3.8–4.8)
ALP SERPL-CCNC: 116 IU/L (ref 44–121)
ALT SERPL-CCNC: 25 IU/L (ref 0–32)
AST SERPL-CCNC: 27 IU/L (ref 0–40)
BASOPHILS # BLD AUTO: 0.1 X10E3/UL (ref 0–0.2)
BASOPHILS NFR BLD AUTO: 1 %
BILIRUB SERPL-MCNC: 0.3 MG/DL (ref 0–1.2)
BUN SERPL-MCNC: 23 MG/DL (ref 8–27)
BUN/CREAT SERPL: 28 (ref 12–28)
CALCIUM SERPL-MCNC: 9.7 MG/DL (ref 8.7–10.3)
CHLORIDE SERPL-SCNC: 102 MMOL/L (ref 96–106)
CO2 SERPL-SCNC: 27 MMOL/L (ref 20–29)
CREAT SERPL-MCNC: 0.81 MG/DL (ref 0.57–1)
EGFRCR SERPLBLD CKD-EPI 2021: 75 ML/MIN/1.73
EOSINOPHIL # BLD AUTO: 0.2 X10E3/UL (ref 0–0.4)
EOSINOPHIL NFR BLD AUTO: 2 %
ERYTHROCYTE [DISTWIDTH] IN BLOOD BY AUTOMATED COUNT: 12.1 % (ref 11.7–15.4)
GLOBULIN SER CALC-MCNC: 2.4 G/DL (ref 1.5–4.5)
GLUCOSE SERPL-MCNC: 87 MG/DL (ref 70–99)
HCT VFR BLD AUTO: 38.6 % (ref 34–46.6)
HGB BLD-MCNC: 12.5 G/DL (ref 11.1–15.9)
IMM GRANULOCYTES # BLD AUTO: 0 X10E3/UL (ref 0–0.1)
IMM GRANULOCYTES NFR BLD AUTO: 0 %
LYMPHOCYTES # BLD AUTO: 1.6 X10E3/UL (ref 0.7–3.1)
LYMPHOCYTES NFR BLD AUTO: 22 %
MCH RBC QN AUTO: 31.2 PG (ref 26.6–33)
MCHC RBC AUTO-ENTMCNC: 32.4 G/DL (ref 31.5–35.7)
MCV RBC AUTO: 96 FL (ref 79–97)
MONOCYTES # BLD AUTO: 0.5 X10E3/UL (ref 0.1–0.9)
MONOCYTES NFR BLD AUTO: 7 %
NEUTROPHILS # BLD AUTO: 4.7 X10E3/UL (ref 1.4–7)
NEUTROPHILS NFR BLD AUTO: 68 %
PLATELET # BLD AUTO: 217 X10E3/UL (ref 150–450)
POTASSIUM SERPL-SCNC: 4.9 MMOL/L (ref 3.5–5.2)
PROT SERPL-MCNC: 6.5 G/DL (ref 6–8.5)
RBC # BLD AUTO: 4.01 X10E6/UL (ref 3.77–5.28)
SODIUM SERPL-SCNC: 142 MMOL/L (ref 134–144)
WBC # BLD AUTO: 7 X10E3/UL (ref 3.4–10.8)

## 2025-08-24 DIAGNOSIS — I10 PRIMARY HYPERTENSION: ICD-10-CM

## 2025-08-25 RX ORDER — OLMESARTAN MEDOXOMIL 5 MG/1
5 TABLET ORAL DAILY
Qty: 90 TABLET | Refills: 0 | Status: SHIPPED | OUTPATIENT
Start: 2025-08-25

## (undated) DEVICE — SENSR O2 OXIMAX FNGR A/ 18IN NONSTR

## (undated) DEVICE — TUBING, SUCTION, 1/4" X 10', STRAIGHT: Brand: MEDLINE

## (undated) DEVICE — BLCK/BITE BLOX W/DENTL/RIM W/STRAP 54F

## (undated) DEVICE — FRCP BX RADJAW4 NDL 2.8 240CM LG OG BX40

## (undated) DEVICE — LN SMPL CO2 SHTRM SD STREAM W/M LUER

## (undated) DEVICE — CANN O2 ETCO2 FITS ALL CONN CO2 SMPL A/ 7IN DISP LF

## (undated) DEVICE — ADAPT CLN BIOGUARD AIR/H2O DISP

## (undated) DEVICE — BITEBLOCK OMNI BLOC

## (undated) DEVICE — KT ORCA ORCAPOD DISP STRL

## (undated) DEVICE — MSK ENDO PORT O2 POM ELITE CURAPLEX A/